# Patient Record
Sex: MALE | Race: WHITE | NOT HISPANIC OR LATINO | Employment: OTHER | URBAN - METROPOLITAN AREA
[De-identification: names, ages, dates, MRNs, and addresses within clinical notes are randomized per-mention and may not be internally consistent; named-entity substitution may affect disease eponyms.]

---

## 2017-01-10 ENCOUNTER — OFFICE VISIT (OUTPATIENT)
Dept: CARDIOLOGY | Facility: MEDICAL CENTER | Age: 67
End: 2017-01-10
Payer: MEDICARE

## 2017-01-10 VITALS
HEART RATE: 74 BPM | HEIGHT: 68 IN | OXYGEN SATURATION: 95 % | SYSTOLIC BLOOD PRESSURE: 120 MMHG | WEIGHT: 196 LBS | BODY MASS INDEX: 29.7 KG/M2 | DIASTOLIC BLOOD PRESSURE: 80 MMHG

## 2017-01-10 DIAGNOSIS — E78.2 MIXED HYPERLIPIDEMIA: ICD-10-CM

## 2017-01-10 DIAGNOSIS — I10 ESSENTIAL HYPERTENSION, BENIGN: Primary | ICD-10-CM

## 2017-01-10 PROCEDURE — 99213 OFFICE O/P EST LOW 20 MIN: CPT | Performed by: INTERNAL MEDICINE

## 2017-01-10 PROCEDURE — 3017F COLORECTAL CA SCREEN DOC REV: CPT | Performed by: INTERNAL MEDICINE

## 2017-01-10 PROCEDURE — G8420 CALC BMI NORM PARAMETERS: HCPCS | Performed by: INTERNAL MEDICINE

## 2017-01-10 PROCEDURE — 4004F PT TOBACCO SCREEN RCVD TLK: CPT | Performed by: INTERNAL MEDICINE

## 2017-01-10 PROCEDURE — G8427 DOCREV CUR MEDS BY ELIG CLIN: HCPCS | Performed by: INTERNAL MEDICINE

## 2017-01-10 ASSESSMENT — ENCOUNTER SYMPTOMS
HEADACHES: 1
CARDIOVASCULAR NEGATIVE: 1

## 2017-01-10 NOTE — MR AVS SNAPSHOT
"        Nelson TRIVEDI Tino   1/10/2017 9:30 AM   Office Visit   MRN: [id number]    Department:  [facility]   Dept Phone:  [phone]    Description:  Male : 1950   Provider:  Mukul Hernandez M.D.           Reason for Visit     Follow-Up           Allergies as of 1/10/2017     Allergen Noted Reactions    Penicillins 2010         Vital Signs     Blood Pressure Pulse Height Weight Body Mass Index Oxygen Saturation    120/80 mmHg 74 1.727 m (5' 7.99\") 88.905 kg (196 lb) 29.81 kg/m2 95%    Smoking Status                   Current Some Day Smoker           Basic Information     Date Of Birth Sex Race Ethnicity Preferred Language    1950 Male White Non- English      Problem List              ICD-10-CM Priority Class Noted - Resolved    Essential hypertension, benign I10   2015 - Present    Hyperlipidemia E78.5   2016 - Present    Depression F32.9   2016 - Present    Seasonal allergies J30.2   2016 - Present      Health Maintenance        Date Due Completion Dates    IMM DTaP/Tdap/Td Vaccine (1 - Tdap) 1967 ---    IMM ZOSTER VACCINE 2008 ---    IMM PNEUMOCOCCAL 65+ (ADULT) LOW/MEDIUM RISK SERIES (1 of 2 - PCV13) 2013 ---    IMM INFLUENZA (1) 2016 ---    COLONOSCOPY 9/3/2024 9/3/2014 (Done)    Override on 9/3/2014: [facility])            Current Immunizations     No immunizations on file.      Below and/or attached are the medications your provider expects you to take. Review all of your home medications and newly ordered medications with your provider and/or pharmacist. Follow medication instructions as directed by your provider and/or pharmacist. Please keep your medication list with you and share with your provider. Update the information when medications are discontinued, doses are changed, or new medications (including over-the-counter products) are added; and carry medication information at all times in the event of emergency " situations     Allergies:  PENICILLINS - (reactions not documented)               Medications  Valid as of: January 10, 2017 -  9:42 AM    Generic Name Brand Name Tablet Size Instructions for use    FLUoxetine HCl (Cap) PROZAC 20 MG Take 20 mg by mouth every day. Take 4  Capsules every morning         Lisinopril (Tab) PRINIVIL 10 MG Take 1 Tab by mouth every day.        Loratadine (Tab) CLARITIN 10 MG Take 10 mg by mouth as needed. For allergies        Omeprazole (CAPSULE DELAYED RELEASE) PRILOSEC 20 MG Take 20 mg by mouth 2 Times a Day.        Simvastatin (Tab) ZOCOR 40 MG Take 40 mg by mouth every evening.        Zolpidem Tartrate (Tab) AMBIEN 10 MG Take 10 mg by mouth at bedtime as needed for Sleep.        .                 Medicines prescribed today were sent to:     [facility] [address] [place] [address], [place] - [address]    [address] [place] [address]    Phone: [phone] Fax: [phone]    Open 24 Hours?: No      Medication refill instructions:       If your prescription bottle indicates you have medication refills left, it is not necessary to call your provider’s office. Please contact your pharmacy and they will refill your medication.    If your prescription bottle indicates you do not have any refills left, you may request refills at any time through one of the following ways: The online [employer] system (except Urgent Care), by calling your provider’s office, or by asking your pharmacy to contact your provider’s office with a refill request. Medication refills are processed only during regular business hours and may not be available until the next business day. Your provider may request additional information or to have a follow-up visit with you prior to refilling your medication.   *Please Note: Medication refills are assigned a new Rx number when refilled electronically. Your pharmacy may indicate that no refills were authorized even though a new prescription for the same medication is  available at the pharmacy. Please request the medicine by name with the pharmacy before contacting your provider for a refill.           Trellise Access Code: [email or web address]  Expires: 2/9/2017  9:13 AM    Trellise  A secure, online tool to manage your health information     [employer] Trellise® is a secure, online tool that connects you to your personalized health information from the privacy of your home -- day or night - making it very easy for you to manage your healthcare. Once the activation process is completed, you can even access your medical information using the Trellise tanner, which is available for free in the Apple Tanner store or Google Play store.     Trellise provides the following levels of access (as shown below):   My Chart Features   [facility] Doctor [facility]  Urgent  Care Non-[facility]  Primary Care  Doctor   Email your healthcare team securely and privately 24/7 X X X    Manage appointments: schedule your next appointment; view details of past/upcoming appointments X      Request prescription refills. X      View recent personal medical records, including lab and immunizations X X X X   View health record, including health history, allergies, medications X X X X   Read reports about your outpatient visits, procedures, consult and ER notes X X X X   See your discharge summary, which is a recap of your hospital and/or ER visit that includes your diagnosis, lab results, and care plan. X X       How to register for Trellise:  1. Go to  [email or web address].  2. Click on the Sign Up Now box, which takes you to the New Member Sign Up page. You will need to provide the following information:  a. Enter your Trellise Access Code exactly as it appears at the top of this page. (You will not need to use this code after you’ve completed the sign-up process. If you do not sign up before the expiration date, you must request a new code.)   b. Enter your date of birth.   c. Enter  your home email address.   d. Click Submit, and follow the next screen’s instructions.  3. Create a PerfectSearcht ID. This will be your H[employer] Space login ID and cannot be changed, so think of one that is secure and easy to remember.  4. Create a PerfectSearcht password. You can change your password at any time.  5. Enter your Password Reset Question and Answer. This can be used at a later time if you forget your password.   6. Enter your e-mail address. This allows you to receive e-mail notifications when new information is available in H[employer] Space.  7. Click Sign Up. You can now view your health information.    For assistance activating your Hire Space account, call [phone]

## 2017-01-10 NOTE — Clinical Note
Name:          Nelson Jiménez   YOB: 1950  Date:     1/10/2017      Vanesa Clemente M.D.  [address] [place] [address]     Mukul Hernandez MD  [address]  [place] [address]  Phone: [phone]  Back Line: [phone]  Fax: [phone]  E-mail: [email or web address]   Dear Dr. Clemente,    We had the pleasure of seeing your patient, Nelson Jiménez, in Cardiology Clinic at [facility] today.    As you know, he is a 66-year-old man with a history of hypertension, dyslipidemia and depression who is self referred for evaluation of hypertension and cardiovascular risk.    He is doing great today without any cardiovascular complaints. His blood pressure in our office was 120/80 mmHg on 10 mg by mouth daily of lisinopril. He is also on simvastatin and tolerating that medication well. I have not seen recent lipid panel and he tells me that he has one scheduled and will send me the results of that. He staying quite active. I encouraged him again in lifestyle modification.    We will have the patient follow-up in one year, sooner if needed.    Thank you for the referral and please do not hesitate to contact me at any time. My contact information is listed above.    This note was dictated using Dragon speech recognition software.     A full note including my physical examination and a full list of rectified medications is available in our medical record, and can be faxed as well.    Mukul Hernandez MD  Cardiologist  [facility]

## 2017-01-10 NOTE — PROGRESS NOTES
Name:          Nelson Jiménez   YOB: 1950  Date:     1/10/2017      Vanesa Clemente M.D.  [address] [place] [address]     Mukul Hernandez MD  [address]  [place] [address]  Phone: [phone]  Back Line: [phone]  Fax: [phone]  E-mail: [email or web address]   Dear Dr. Clemente,    We had the pleasure of seeing your patient, Nelson Jiménez, in Cardiology Clinic at [facility] today.    As you know, he is a 66-year-old man with a history of hypertension, dyslipidemia and depression who is self referred for evaluation of hypertension and cardiovascular risk.    He is doing great today without any cardiovascular complaints. His blood pressure in our office was 120/80 mmHg on 10 mg by mouth daily of lisinopril. He is also on simvastatin and tolerating that medication well. I have not seen recent lipid panel and he tells me that he has one scheduled and will send me the results of that. He staying quite active. I encouraged him again in lifestyle modification.    We will have the patient follow-up in one year, sooner if needed.    Thank you for the referral and please do not hesitate to contact me at any time. My contact information is listed above.    This note was dictated using Dragon speech recognition software.     A full note including my physical examination and a full list of rectified medications is available in our medical record, and can be faxed as well.    Mukul Hernandez MD  Cardiologist  [facility]

## 2017-01-10 NOTE — PROGRESS NOTES
Subjective:   Nelson Jiménez is a 66-year-old man with a history of hypertension, dyslipidemia and depression who is self referred for evaluation of hypertension and cardiovascular risk.    He continues to do well, has no cardiovascular complaint. He is staying quite active having skied recently and exercising for 30 minutes on his stationary bike at her rates around 110 215 bpm fairly closely regulated with out again any chest discomfort nor new exertional dyspnea.    He does relate some headaches that he says are well treated with Excedrin.    [facility] He brings in his blood pressure cuff, which was unable to be calibrated. It is apparently about 15 years old.    Past Medical History   Diagnosis Date   • Psychiatric disorder      depression   • Anxiety    • Indigestion      GERD   • Backpain    • Arthritis      loulou spine   • Degenerative joint disease of spine    • Essential hypertension, benign 12/21/2015     Past Surgical History   Procedure Laterality Date   • Other orthopedic surgery       L little finger fx repair     Family History   Problem Relation Age of Onset   • Hypertension Mother    • Hyperlipidemia Mother    • Other Father      psorosis of the liver     History   Smoking status   • Current Some Day Smoker   • Types: Cigars   Smokeless tobacco   • Never Used     Comment: 1-2 cigars per month     Allergies   Allergen Reactions   • Penicillins      Outpatient Encounter Prescriptions as of 1/10/2017   Medication Sig Dispense Refill   • lisinopril (PRINIVIL) 10 MG Tab Take 1 Tab by mouth every day. 90 Tab 3   • zolpidem (AMBIEN) 10 MG Tab Take 10 mg by mouth at bedtime as needed for Sleep.     • loratadine (CLARITIN) 10 MG TABS Take 10 mg by mouth as needed. For allergies     • fluoxetine (PROZAC) 20 MG CAPS Take 20 mg by mouth every day. Take 4  Capsules every morning      • omeprazole (PRILOSEC) 20 MG CPDR Take 20 mg by mouth 2 Times a Day.     •  "simvastatin (ZOCOR) 40 MG TABS Take 40 mg by mouth every evening.       No facility-administered encounter medications on file as of 1/10/2017.     Review of Systems   Cardiovascular: Negative.    Neurological: Positive for headaches.   All other systems reviewed and are negative.       Objective:   /80 mmHg  Pulse 74  Ht 1.727 m (5' 7.99\")  Wt 88.905 kg (196 lb)  BMI 29.81 kg/m2  SpO2 95%    Physical Exam   Constitutional: He is oriented to person, place, and time. He appears well-developed and well-nourished. No distress.   Very pleasant middle-aged man in no distress   HENT:   Head: Normocephalic and atraumatic.   Eyes: Conjunctivae and EOM are normal. Pupils are equal, round, and reactive to light. No scleral icterus.   Neck: Neck supple. No JVD present. No tracheal deviation present.   Cardiovascular: Normal rate, regular rhythm, normal heart sounds and intact distal pulses.  Exam reveals no gallop and no friction rub.    No murmur heard.  No carotid bruits   Pulmonary/Chest: Effort normal and breath sounds normal. No stridor. No respiratory distress. He has no wheezes. He has no rales.   Abdominal: Soft. Bowel sounds are normal. He exhibits no distension.   Musculoskeletal: He exhibits no edema.   Neurological: He is alert and oriented to person, place, and time.   Skin: Skin is warm and dry. No rash noted. He is not diaphoretic. No erythema. No pallor.   Psychiatric: He has a normal mood and affect. Judgment and thought content normal.   Vitals reviewed.       2/9/2010    Cholesterol,Tot 139   Triglycerides 94   HDL 32 (L)   LDL 88       Assessment:     1. Essential hypertension, benign     2. Mixed hyperlipidemia         Medical Decision Making:  Today's Assessment / Status / Plan:     See separate letter to PCP.    Mukul Hernandez MD  Cardiologist, [facility] [place]     "

## 2017-06-30 ENCOUNTER — OFFICE VISIT (OUTPATIENT)
Dept: URGENT CARE | Facility: CLINIC | Age: 67
End: 2017-06-30
Payer: MEDICARE

## 2017-06-30 VITALS
RESPIRATION RATE: 20 BRPM | BODY MASS INDEX: 27.38 KG/M2 | OXYGEN SATURATION: 96 % | WEIGHT: 180 LBS | HEART RATE: 72 BPM | DIASTOLIC BLOOD PRESSURE: 68 MMHG | TEMPERATURE: 97.7 F | SYSTOLIC BLOOD PRESSURE: 110 MMHG

## 2017-06-30 DIAGNOSIS — J06.9 URI, ACUTE: ICD-10-CM

## 2017-06-30 PROCEDURE — 99203 OFFICE O/P NEW LOW 30 MIN: CPT | Performed by: NURSE PRACTITIONER

## 2017-06-30 RX ORDER — AZITHROMYCIN 250 MG/1
TABLET, FILM COATED ORAL
Qty: 1 QUANTITY SUFFICIENT | Refills: 0 | Status: SHIPPED | OUTPATIENT
Start: 2017-06-30 | End: 2018-03-15

## 2017-06-30 ASSESSMENT — ENCOUNTER SYMPTOMS
NAUSEA: 0
CHILLS: 0
DIZZINESS: 0
COUGH: 1
HEADACHES: 0
DIARRHEA: 0
SORE THROAT: 0
PALPITATIONS: 0
FEVER: 0
VOMITING: 0
SHORTNESS OF BREATH: 0
MYALGIAS: 0
SPUTUM PRODUCTION: 1

## 2017-06-30 NOTE — MR AVS SNAPSHOT
Nelson Jiménez   2017 10:30 AM   Office Visit   MRN: [id number]    Department:  [facility]   Dept Phone:  [phone]    Description:  Male : 1950   Provider:  MADHU Wade.           Reason for Visit     Cough Over a week dry cpogh and chest congestion .      Allergies as of 2017     Allergen Noted Reactions    Penicillins 2010         You were diagnosed with     URI, acute   [[id number]]         Vital Signs     Blood Pressure Pulse Temperature Respirations Weight Oxygen Saturation    110/68 mmHg 72 36.5 °C (97.7 °F) 20 81.647 kg (180 lb) 96%    Smoking Status                   Current Some Day Smoker           Basic Information     Date Of Birth Sex Race Ethnicity Preferred Language    1950 Male White Non- English      Problem List              ICD-10-CM Priority Class Noted - Resolved    Essential hypertension, benign I10   2015 - Present    Hyperlipidemia E78.5   2016 - Present    Depression F32.9   2016 - Present    Seasonal allergies J30.2   2016 - Present      Health Maintenance        Date Due Completion Dates    IMM DTaP/Tdap/Td Vaccine (1 - Tdap) 1967 ---    IMM ZOSTER VACCINE 2008 ---    IMM PNEUMOCOCCAL 65+ (ADULT) LOW/MEDIUM RISK SERIES (1 of 2 - PCV13) 2013 ---    COLONOSCOPY 9/3/2024 9/3/2014 (Done)    Override on 9/3/2014: [facility])            Current Immunizations     No immunizations on file.      Below and/or attached are the medications your provider expects you to take. Review all of your home medications and newly ordered medications with your provider and/or pharmacist. Follow medication instructions as directed by your provider and/or pharmacist. Please keep your medication list with you and share with your provider. Update the information when medications are discontinued, doses are changed, or new medications (including over-the-counter products) are added; and carry  medication information at all times in the event of emergency situations     Allergies:  PENICILLINS - (reactions not documented)               Medications  Valid as of: June 30, 2017 - 12:55 PM    Generic Name Brand Name Tablet Size Instructions for use    Azithromycin (Tab) ZITHROMAX 250 MG Take a zpack as directed        FLUoxetine HCl (Cap) PROZAC 20 MG Take 20 mg by mouth every day. Take 4  Capsules every morning         Lisinopril (Tab) PRINIVIL 10 MG Take 1 Tab by mouth every day.        Loratadine (Tab) CLARITIN 10 MG Take 10 mg by mouth as needed. For allergies        Omeprazole (CAPSULE DELAYED RELEASE) PRILOSEC 20 MG Take 20 mg by mouth 2 Times a Day.        Simvastatin (Tab) ZOCOR 40 MG Take 40 mg by mouth every evening.        Zolpidem Tartrate (Tab) AMBIEN 10 MG Take 10 mg by mouth at bedtime as needed for Sleep.        .                 Medicines prescribed today were sent to:     [facility] [address] [place] [address], [place] - [address]    [address] [place] [address]    Phone: [phone] Fax: [phone]    Open 24 Hours?: No      Medication refill instructions:       If your prescription bottle indicates you have medication refills left, it is not necessary to call your provider’s office. Please contact your pharmacy and they will refill your medication.    If your prescription bottle indicates you do not have any refills left, you may request refills at any time through one of the following ways: The online [employer] system (except Urgent Care), by calling your provider’s office, or by asking your pharmacy to contact your provider’s office with a refill request. Medication refills are processed only during regular business hours and may not be available until the next business day. Your provider may request additional information or to have a follow-up visit with you prior to refilling your medication.   *Please Note: Medication refills are assigned a new Rx number when refilled  electronically. Your pharmacy may indicate that no refills were authorized even though a new prescription for the same medication is available at the pharmacy. Please request the medicine by name with the pharmacy before contacting your provider for a refill.           MyChart Status: Patient Declined        Quit Tobacco Information     Do you want to quit using tobacco?    Quitting tobacco decreases risks of cancer, heart and lung disease, increases life expectancy, improves sense of taste and smell, and increases spending money, among other benefits.    If you are thinking about quitting, we can help.  • [facility] Quit Tobacco Program: 990.529.9551  o Program occurs weekly for four weeks and includes pharmacist consultation on products to support quitting smoking or chewing tobacco. A provider referral is needed for pharmacist consultation.  • Tobacco Users Help Hotline: 1-339-QUITNOW (545-0151) or https://[place].quitlogix.org/  o Free, confidential telephone and online coaching for [place] residents. Sessions are designed on a schedule that is convenient for you. Eligible clients receive free nicotine replacement therapy.  • Nationally: www.smokefree.gov  o Information and professional assistance to support both immediate and long-term needs as you become, and remain, a non-smoker. Smokefree.gov allows you to choose the help that best fits your needs.

## 2017-06-30 NOTE — PROGRESS NOTES
"Subjective:      Nelson Jiménez is a 66 y.o. male who presents with Cough    Chief Complaint   Patient presents with   • Cough     Over a week dry cpogh and chest congestion .     Started 3 weeks ago with fever, states got a little better but then got fatigue, chills, body aches  Now \"feels deep in chest:\"  No fever now  No medications  No ear pain           HPI    Review of Systems   Constitutional: Positive for malaise/fatigue. Negative for fever and chills.   HENT: Positive for congestion. Negative for sore throat.    Respiratory: Positive for cough and sputum production. Negative for shortness of breath.    Cardiovascular: Negative for chest pain and palpitations.   Gastrointestinal: Negative for nausea, vomiting and diarrhea.   Genitourinary: Negative for dysuria.   Musculoskeletal: Negative for myalgias.   Skin: Negative for rash.   Neurological: Negative for dizziness and headaches.     Past Medical History   Diagnosis Date   • Psychiatric disorder      depression   • Anxiety    • Indigestion      GERD   • Backpain    • Arthritis      loulou spine   • Degenerative joint disease of spine    • Essential hypertension, benign 12/21/2015     Family history reviewed and not related to this visit  Social History     Social History   • Marital Status: Single     Spouse Name: N/A   • Number of Children: N/A   • Years of Education: N/A     Occupational History   • Not on file.     Social History Main Topics   • Smoking status: Current Some Day Smoker     Types: Cigars   • Smokeless tobacco: Never Used      Comment: 1-2 cigars per month   • Alcohol Use: 7.2 oz/week     12 Cans of beer per week      Comment: 1-2 beers per week   • Drug Use: No   • Sexual Activity: Yes     Other Topics Concern   • Not on file     Social History Narrative         Objective:     /68 mmHg  Pulse 72  Temp(Src) 36.5 °C (97.7 °F)  Resp 20  Wt 81.647 kg (180 lb)  SpO2 96%     Physical Exam   Constitutional: He is oriented to person, " place, and time. He appears well-developed and well-nourished. No distress.   HENT:   Head: Normocephalic and atraumatic.   Increased post nasal drip     Cardiovascular: Normal rate, regular rhythm and normal heart sounds.    Pulmonary/Chest: Effort normal. No respiratory distress. He has no wheezes. He has rales (moved with cough).   Musculoskeletal: Normal range of motion.   Neurological: He is alert and oriented to person, place, and time.   Skin: Skin is warm and dry.   Psychiatric: He has a normal mood and affect. His behavior is normal. Judgment and thought content normal.   Nursing note and vitals reviewed.              Assessment/Plan:     1. URI, acute     - azithromycin (ZITHROMAX) 250 MG Tab; Take a zpack as directed  Dispense: 1 Quantity Sufficient; Refill: 0    Please make an appointment for follow up with your primary care provider or make appointment to establish with a primary care. Return for any perception of change in condition, worsening of symptoms, such as perception of worse pain, worsening fever, not getting better, or any other concerns. Please go to the nearest emergency room for any shortness of breath or chest pain or for any of the emergent conditions we have discussed. Patient and/or family states understanding to plan of care, and discharge instructions. Different diagnosis were discussed and signs/symptoms were discussed to educate on.

## 2017-11-28 ENCOUNTER — APPOINTMENT (RX ONLY)
Dept: URBAN - METROPOLITAN AREA CLINIC 20 | Facility: CLINIC | Age: 67
Setting detail: DERMATOLOGY
End: 2017-11-28

## 2017-11-28 DIAGNOSIS — L81.4 OTHER MELANIN HYPERPIGMENTATION: ICD-10-CM

## 2017-11-28 DIAGNOSIS — D18.0 HEMANGIOMA: ICD-10-CM

## 2017-11-28 DIAGNOSIS — L82.1 OTHER SEBORRHEIC KERATOSIS: ICD-10-CM

## 2017-11-28 DIAGNOSIS — L57.8 OTHER SKIN CHANGES DUE TO CHRONIC EXPOSURE TO NONIONIZING RADIATION: ICD-10-CM

## 2017-11-28 DIAGNOSIS — L82.0 INFLAMED SEBORRHEIC KERATOSIS: ICD-10-CM

## 2017-11-28 DIAGNOSIS — D22 MELANOCYTIC NEVI: ICD-10-CM

## 2017-11-28 PROBLEM — K21.9 GASTRO-ESOPHAGEAL REFLUX DISEASE WITHOUT ESOPHAGITIS: Status: ACTIVE | Noted: 2017-11-28

## 2017-11-28 PROBLEM — D22.5 MELANOCYTIC NEVI OF TRUNK: Status: ACTIVE | Noted: 2017-11-28

## 2017-11-28 PROBLEM — I10 ESSENTIAL (PRIMARY) HYPERTENSION: Status: ACTIVE | Noted: 2017-11-28

## 2017-11-28 PROBLEM — E78.5 HYPERLIPIDEMIA, UNSPECIFIED: Status: ACTIVE | Noted: 2017-11-28

## 2017-11-28 PROBLEM — D18.01 HEMANGIOMA OF SKIN AND SUBCUTANEOUS TISSUE: Status: ACTIVE | Noted: 2017-11-28

## 2017-11-28 PROCEDURE — ? COUNSELING

## 2017-11-28 PROCEDURE — 17110 DESTRUCTION B9 LES UP TO 14: CPT

## 2017-11-28 PROCEDURE — 99214 OFFICE O/P EST MOD 30 MIN: CPT | Mod: 25

## 2017-11-28 PROCEDURE — ? LIQUID NITROGEN

## 2017-11-28 ASSESSMENT — LOCATION DETAILED DESCRIPTION DERM
LOCATION DETAILED: RIGHT INFERIOR LATERAL FOREHEAD
LOCATION DETAILED: LEFT ANTERIOR DISTAL THIGH
LOCATION DETAILED: RIGHT ANTERIOR PROXIMAL UPPER ARM
LOCATION DETAILED: LEFT PROXIMAL DORSAL FOREARM
LOCATION DETAILED: RIGHT CENTRAL MALAR CHEEK
LOCATION DETAILED: RIGHT SUPERIOR MEDIAL MALAR CHEEK
LOCATION DETAILED: RIGHT ANTERIOR EARLOBE
LOCATION DETAILED: RIGHT LATERAL FOREHEAD
LOCATION DETAILED: RIGHT MID-UPPER BACK
LOCATION DETAILED: RIGHT PROXIMAL DORSAL FOREARM
LOCATION DETAILED: LEFT ANTERIOR PROXIMAL UPPER ARM
LOCATION DETAILED: LEFT INFERIOR CENTRAL MALAR CHEEK
LOCATION DETAILED: LEFT MEDIAL SUPERIOR CHEST
LOCATION DETAILED: RIGHT SUPERIOR MEDIAL UPPER BACK
LOCATION DETAILED: LEFT MEDIAL MALAR CHEEK
LOCATION DETAILED: RIGHT ANTERIOR DISTAL THIGH
LOCATION DETAILED: RIGHT INFERIOR UPPER BACK

## 2017-11-28 ASSESSMENT — LOCATION SIMPLE DESCRIPTION DERM
LOCATION SIMPLE: LEFT CHEEK
LOCATION SIMPLE: LEFT UPPER ARM
LOCATION SIMPLE: LEFT FOREARM
LOCATION SIMPLE: LEFT THIGH
LOCATION SIMPLE: RIGHT CHEEK
LOCATION SIMPLE: CHEST
LOCATION SIMPLE: RIGHT EAR
LOCATION SIMPLE: RIGHT FOREHEAD
LOCATION SIMPLE: RIGHT UPPER ARM
LOCATION SIMPLE: RIGHT UPPER BACK
LOCATION SIMPLE: RIGHT THIGH
LOCATION SIMPLE: RIGHT FOREARM

## 2017-11-28 ASSESSMENT — LOCATION ZONE DERM
LOCATION ZONE: ARM
LOCATION ZONE: FACE
LOCATION ZONE: EAR
LOCATION ZONE: TRUNK
LOCATION ZONE: LEG

## 2018-03-15 ENCOUNTER — OFFICE VISIT (OUTPATIENT)
Dept: CARDIOLOGY | Facility: MEDICAL CENTER | Age: 68
End: 2018-03-15
Payer: MEDICARE

## 2018-03-15 VITALS
OXYGEN SATURATION: 96 % | DIASTOLIC BLOOD PRESSURE: 90 MMHG | HEIGHT: 68 IN | HEART RATE: 82 BPM | BODY MASS INDEX: 29.1 KG/M2 | WEIGHT: 192 LBS | SYSTOLIC BLOOD PRESSURE: 130 MMHG

## 2018-03-15 DIAGNOSIS — I10 ESSENTIAL HYPERTENSION, BENIGN: ICD-10-CM

## 2018-03-15 DIAGNOSIS — E78.5 DYSLIPIDEMIA: Primary | ICD-10-CM

## 2018-03-15 DIAGNOSIS — F17.200 SMOKER: ICD-10-CM

## 2018-03-15 PROCEDURE — 99213 OFFICE O/P EST LOW 20 MIN: CPT | Performed by: INTERNAL MEDICINE

## 2018-03-15 RX ORDER — DIPHENHYDRAMINE HCL 25 MG
25 TABLET ORAL EVERY 6 HOURS PRN
COMMUNITY
End: 2022-04-26

## 2018-03-15 ASSESSMENT — ENCOUNTER SYMPTOMS
CARDIOVASCULAR NEGATIVE: 1
HEADACHES: 1

## 2018-03-15 NOTE — LETTER
Name:          Nelson Jiménez   YOB: 1950  Date:     3/15/2018      Vanesa Clemente M.D.  [address] [place] [address]     Mukul Hernandez MD  [address], [address]  [place] [address]  Phone: [phone]  Back Line: [phone]  Fax: [phone]  E-mail: [email or web address]   Dear Dr. Clemente,    We had the pleasure of seeing your patient, Nelson Jiménez, in Cardiology Clinic at [facility] and [facility] today.    As you know, he is a 67-year-old man with a history of hypertension, dyslipidemia and depression who is self referred for evaluation of hypertension and cardiovascular risk.    He again has no cardiovascular complaints, but brings in a lipid panel with an LDL get above goal at 115 mg/dL as it was roughly a year ago, and continues to smoke. In conjunction with his ongoing risk factors I did order a CT coronary calcium scan to adjudicate whether or not we should control his lipids more tightly. I encouraged continued lifestyle modification.    Return in about 3 months (around 6/15/2018).    Thank you for the referral and please do not hesitate to contact me at any time. My contact information is listed above.    This note was dictated using Dragon speech recognition software.     A full note including my physical examination and a full list of rectified medications is available in our medical record, and can be faxed as well.    Mukul Hernandez MD  Cardiologist  [facility]

## 2018-03-16 NOTE — PROGRESS NOTES
Subjective:   Nelson Jiménez is a 66-year-old man with a history of hypertension, dyslipidemia and depression who is self referred for evaluation of hypertension and cardiovascular risk.    He continues to do well today, and has no cardiovascular complaints. He tells me that he has skiing for exercise and also doing gym exercises on a stationary bike with heart rates just below 120 bpm out of concern for risk of a coronary event with higher intensity exercise.    He has no side effects of his medications, and again is otherwise doing well. [facility] detailed [facility].    Past Medical History:   Diagnosis Date   • Anxiety    • Arthritis     loulou spine   • Backpain    • Degenerative joint disease of spine    • Essential hypertension, benign 12/21/2015   • Indigestion     GERD   • Psychiatric disorder     depression     Past Surgical History:   Procedure Laterality Date   • OTHER ORTHOPEDIC SURGERY      L little finger fx repair     Family History   Problem Relation Age of Onset   • Hypertension Mother    • Hyperlipidemia Mother    • Other Father      psorosis of the liver     History   Smoking Status   • Current Some Day Smoker   • Types: Cigars   Smokeless Tobacco   • Never Used     Comment: 1-2 cigars per month     Allergies   Allergen Reactions   • Penicillins      Outpatient Encounter Prescriptions as of 3/15/2018   Medication Sig Dispense Refill   • diphenhydrAMINE (BENADRYL) 25 MG Tab Take 25 mg by mouth as needed for Sleep.     • lisinopril (PRINIVIL) 10 MG Tab Take 1 Tab by mouth every day. 90 Tab 3   • zolpidem (AMBIEN) 10 MG Tab Take 10 mg by mouth at bedtime as needed for Sleep.     • loratadine (CLARITIN) 10 MG TABS Take 10 mg by mouth as needed. For allergies     • fluoxetine (PROZAC) 20 MG CAPS Take 20 mg by mouth every day. Take 4  Capsules every morning      • omeprazole (PRILOSEC) 20 MG CPDR Take 20 mg by mouth 2 Times a Day.     • simvastatin (ZOCOR) 40 MG  "TABS Take 40 mg by mouth every evening.     • [DISCONTINUED] azithromycin (ZITHROMAX) 250 MG Tab Take a zpack as directed (Patient not taking: Reported on 3/15/2018) 1 Quantity Sufficient 0     No facility-administered encounter medications on file as of 3/15/2018.      Review of Systems   Cardiovascular: Negative.    Neurological: Positive for headaches.   All other systems reviewed and are negative.       Objective:   /90   Pulse 82   Ht 1.727 m (5' 8\")   Wt 87.1 kg (192 lb)   SpO2 96%   BMI 29.19 kg/m²     Physical Exam   Constitutional: He is oriented to person, place, and time. He appears well-developed and well-nourished. No distress.   Very pleasant middle-aged man in no distress   HENT:   Head: Normocephalic and atraumatic.   Eyes: Conjunctivae and EOM are normal. Pupils are equal, round, and reactive to light. No scleral icterus.   Neck: Neck supple. No JVD present. No tracheal deviation present.   Cardiovascular: Normal rate, regular rhythm, normal heart sounds and intact distal pulses.  Exam reveals no gallop and no friction rub.    No murmur heard.  No carotid bruits   Pulmonary/Chest: Effort normal and breath sounds normal. No stridor. No respiratory distress. He has no wheezes. He has no rales.   Abdominal: Soft. Bowel sounds are normal. He exhibits no distension.   Musculoskeletal: He exhibits no edema.   Neurological: He is alert and oriented to person, place, and time.   Skin: Skin is warm and dry. No rash noted. He is not diaphoretic. No erythema. No pallor.   Psychiatric: He has a normal mood and affect. Judgment and thought content normal.   Vitals reviewed.    Labs, 4/7/2017  CBC: WBC 4.0, hemoglobin 14.7, platelets 393  Chemstrip panel: Sodium 139, potassium 4.3, chloride 105, CO2 27, BUN 17, creatinine 1.1, glucose 97  LFTs: AST 20, ALT 14, alkaline phosphatase 105, albumin 4.2, total protein 7.6, total bili  melissa 0.6  Lipids: Calculated , HDL 60, triglycerides 103, total " cholesterol 190    [facility]  Chemistry panel: Sodium 140, potassium 3.8, chloride not reported, CO2 also not reported, BUN 15, creatinine 1.1, glucose 95, calcium 9.0  LFTs: AST 19, ALT 14, alkaline phosphatase 104, albumin 4.3, total protein 7.6  Lipids: , HDL 61, triglycerides 89, total cholesterol 191  CBC: WBC 4.8, hemoglobin 14.7, platelets 397    Assessment:     1. Dyslipidemia  CT-CARDIAC SCORING   2. Essential hypertension, benign     3. Smoker         Medical Decision Making:  Today's Assessment / Status / Plan:     He again has no cardiovascular complaints, but brings in a lipid panel with an LDL get above goal at 115 mg/dL as it was roughly a year ago, and continues to smoke. In conjunction with his ongoing risk factors I did order a CT coronary calcium scan to adjudicate whether or not we should control his lipids more tightly. I encouraged continued lifestyle modification.    Mukul Hernandez MD  Cardiologist, [facility] [place]     Return in about 3 months (around 6/15/2018).

## 2018-03-26 ENCOUNTER — HOSPITAL ENCOUNTER (OUTPATIENT)
Dept: RADIOLOGY | Facility: MEDICAL CENTER | Age: 68
End: 2018-03-26
Attending: INTERNAL MEDICINE
Payer: COMMERCIAL

## 2018-03-26 DIAGNOSIS — E78.5 DYSLIPIDEMIA: ICD-10-CM

## 2018-03-26 PROCEDURE — 4410556 CT-CARDIAC SCORING

## 2018-03-27 ENCOUNTER — TELEPHONE (OUTPATIENT)
Dept: CARDIOLOGY | Facility: MEDICAL CENTER | Age: 68
End: 2018-03-27

## 2018-03-27 DIAGNOSIS — E78.2 MIXED HYPERLIPIDEMIA: ICD-10-CM

## 2018-03-27 RX ORDER — ATORVASTATIN CALCIUM 40 MG/1
40 TABLET, FILM COATED ORAL
Qty: 30 TAB | Refills: 6 | OUTPATIENT
Start: 2018-03-27 | End: 2018-04-03 | Stop reason: SDUPTHER

## 2018-03-27 NOTE — TELEPHONE ENCOUNTER
----- Message from Mukul Hernandez M.D. sent at 3/27/2018  8:08 AM PDT -----  Moderate coronary calcification. On the basis of these results, I would recommend changing his cholesterol medicine from simvastatin to atorvastatin 40 mg by mouth daily at bedtime. That in addition to diet and exercise will be required to bring his LDL cholesterol down to a target of 70 mg/dL. Recheck lipid panel just prior to our next visit in June.    LUCINA BOYLE    =======================================================    Attempted to call pt on his home and mobile phone, no answer, left vm to call back

## 2018-03-27 NOTE — TELEPHONE ENCOUNTER
Pt called back, discussed CT-Cardiac scoring per Dr Hernandez and his recommendations, pt agreed and verbalizes understanding.     New Rx for Atorvastatin called in to [facility], MAR updated.    Lipid panel ordered, lab slip mailed to pt.

## 2018-04-03 ENCOUNTER — TELEPHONE (OUTPATIENT)
Dept: CARDIOLOGY | Facility: MEDICAL CENTER | Age: 68
End: 2018-04-03

## 2018-04-03 DIAGNOSIS — E78.2 MIXED HYPERLIPIDEMIA: ICD-10-CM

## 2018-04-03 RX ORDER — ATORVASTATIN CALCIUM 40 MG/1
40 TABLET, FILM COATED ORAL
Qty: 30 TAB | Refills: 11 | Status: SHIPPED
Start: 2018-04-03 | End: 2018-06-11 | Stop reason: SDUPTHER

## 2018-04-03 NOTE — TELEPHONE ENCOUNTER
----- Message from Krystal Roy sent at 4/3/2018  7:43 AM PDT -----  Regarding: [facility]  [facility]/Jossie    Patient said that Dr Hernandez took him off Simvastatin and put him on Atorvastatin. [facility] and they need something in writing about the change faxed to them at [phone]. Patient can be reached at [phone].    ================================================================    [facility]'s [facility]

## 2018-06-11 ENCOUNTER — TELEPHONE (OUTPATIENT)
Dept: CARDIOLOGY | Facility: MEDICAL CENTER | Age: 68
End: 2018-06-11

## 2018-06-11 DIAGNOSIS — E78.2 MIXED HYPERLIPIDEMIA: ICD-10-CM

## 2018-06-11 RX ORDER — ATORVASTATIN CALCIUM 40 MG/1
40 TABLET, FILM COATED ORAL
Qty: 30 TAB | Refills: 11 | Status: SHIPPED | OUTPATIENT
Start: 2018-06-11 | End: 2018-12-26 | Stop reason: SDUPTHER

## 2018-06-11 NOTE — TELEPHONE ENCOUNTER
----- Message from Miguelina Bowen sent at 6/11/2018  9:56 AM PDT -----  Regarding: patient needs [facility] to [facility]  [facility]/Tabitha      Patient said his Atorvastatin he [facility] in [facility] was 20 MGS, and it was supposed to be 40 MGS. He doubled up on the medication, and now he's out. He's requesting a new prescription for Atorvastatin 40 MGS [facility] at [phone]. Patient can be reached at [phone].

## 2018-06-19 LAB
CHOLEST SERPL-MCNC: 155 MG/DL (ref 100–199)
HDLC SERPL-MCNC: 68 MG/DL
LABORATORY COMMENT REPORT: NORMAL
LDLC SERPL CALC-MCNC: 76 MG/DL (ref 0–99)
TRIGL SERPL-MCNC: 53 MG/DL (ref 0–149)
VLDLC SERPL CALC-MCNC: 11 MG/DL (ref 5–40)

## 2018-06-20 ENCOUNTER — OFFICE VISIT (OUTPATIENT)
Dept: CARDIOLOGY | Facility: MEDICAL CENTER | Age: 68
End: 2018-06-20
Payer: MEDICARE

## 2018-06-20 VITALS
BODY MASS INDEX: 28.04 KG/M2 | WEIGHT: 185 LBS | HEART RATE: 84 BPM | OXYGEN SATURATION: 96 % | DIASTOLIC BLOOD PRESSURE: 60 MMHG | SYSTOLIC BLOOD PRESSURE: 110 MMHG | HEIGHT: 68 IN

## 2018-06-20 DIAGNOSIS — I10 ESSENTIAL HYPERTENSION, BENIGN: ICD-10-CM

## 2018-06-20 DIAGNOSIS — E78.2 MIXED HYPERLIPIDEMIA: ICD-10-CM

## 2018-06-20 DIAGNOSIS — I25.10 CORONARY ARTERY CALCIFICATION SEEN ON CAT SCAN: Primary | ICD-10-CM

## 2018-06-20 PROCEDURE — 99213 OFFICE O/P EST LOW 20 MIN: CPT | Performed by: INTERNAL MEDICINE

## 2018-06-20 ASSESSMENT — ENCOUNTER SYMPTOMS
HEADACHES: 1
CARDIOVASCULAR NEGATIVE: 1

## 2018-06-20 NOTE — LETTER
Name:          Nelson Jiménez   YOB: 1950  Date:     06/20/2018      Tennille Sampson M.D.  [address]  [place] [address]     Mukul Hernandez MD  [address]  [place] [address]  Phone: [phone]  Back Line: [phone]  Fax: [phone]  E-mail: [email or web address]   Dear Dr. Sampson,    We had the pleasure of seeing your patient, Nelson Jiménez, in Cardiology Clinic at [facility] and Vascular today.    As you know, he is a 67-year-old man who I follow for hypertension, and dyslipidemia with moderate coronary artery calcification on CT chest (total score 519 by CT 3/2018).    He is doing well and has no cardiovascular complaints as usual.  He comes in to review the results of the CT coronary calcium scan that did show moderately calcified coronary arteries related to which I changed his simvastatin to atorvastatin 40 mg p.o. nightly.  On that therapy is LDL is 76 mg/dL much closer to my goal of 70 mg/dL.  I recommend at this point diet and exerciseed to get him to goal on his lipids.  His blood pressure is well controlled, and I have made no other changes to his medical regimen nor ordered additional testing at this time.  Specifically, I do not think he needs a stress test on the basis of his coronary calcifications.    Return in about 1 year (around 6/20/2019).    Thank you for the referral and please do not hesitate to contact me at any time. My contact information is listed above.    This note was dictated using Dragon speech recognition software.     A full note including my physical examination and a full list of rectified medications is available in our medical record, and can be faxed as well.    Mukul Hernandez MD  Cardiologist  [facility]

## 2018-06-20 NOTE — PROGRESS NOTES
Subjective:   Nelson Jiménez is a 67 -year-old man who I follow for hypertension, and dyslipidemia with moderate coronary artery calcification on CT chest (total score 519 by CT 3/2018).    He continues to do well, and has no cardiovascular complaints coming in to review the results of his CT coronary calcium scan that did show moderately calcified coronary arteries related to which I changed him from simvastatin to atorvastatin.  He denies any side effects with the atorvastatin.    He does continue to walk quite a bit with golfing and has not yet started participating in writing his wife's stationary bike.  He plans to do so, and asked me about some points regarding that.  He is attempting to modify his diet favorably though he does not much appreciate fish.    Past Medical History:   Diagnosis Date   • Anxiety    • Arthritis     loulou spine   • Backpain    • Degenerative joint disease of spine    • Essential hypertension, benign 12/21/2015   • Indigestion     GERD   • Psychiatric disorder     depression     Past Surgical History:   Procedure Laterality Date   • OTHER ORTHOPEDIC SURGERY      L little finger fx repair     Family History   Problem Relation Age of Onset   • Hypertension Mother    • Hyperlipidemia Mother    • Other Father      psorosis of the liver     History   Smoking Status   • Current Some Day Smoker   • Types: Cigars   Smokeless Tobacco   • Never Used     Comment: 1-2 cigars per month     Allergies   Allergen Reactions   • Penicillins      Outpatient Encounter Prescriptions as of 6/20/2018   Medication Sig Dispense Refill   • atorvastatin (LIPITOR) 40 MG Tab Take 1 Tab by mouth every bedtime. 30 Tab 11   • lisinopril (PRINIVIL) 10 MG Tab Take 1 Tab by mouth every day. 90 Tab 3   • zolpidem (AMBIEN) 10 MG Tab Take 10 mg by mouth at bedtime as needed for Sleep.     • loratadine (CLARITIN) 10 MG TABS Take 10 mg by mouth as needed. For allergies     • fluoxetine (PROZAC) 20 MG CAPS Take 20 mg by mouth  "every day. Take 4  Capsules every morning      • omeprazole (PRILOSEC) 20 MG CPDR Take 20 mg by mouth 2 Times a Day.     • diphenhydrAMINE (BENADRYL) 25 MG Tab Take 25 mg by mouth as needed for Sleep.       No facility-administered encounter medications on file as of 6/20/2018.      Review of Systems   Cardiovascular: Negative.    Neurological: Positive for headaches.   All other systems reviewed and are negative.       Objective:   /60   Pulse 84   Ht 1.727 m (5' 8\")   Wt 83.9 kg (185 lb)   SpO2 96%   BMI 28.13 kg/m²     Physical Exam   Constitutional: He is oriented to person, place, and time. He appears well-developed and well-nourished. No distress.   Very pleasant middle-aged man in no distress   HENT:   Head: Normocephalic and atraumatic.   Eyes: Conjunctivae and EOM are normal. Pupils are equal, round, and reactive to light. No scleral icterus.   Neck: Neck supple. No JVD present. No tracheal deviation present.   Cardiovascular: Normal rate, regular rhythm, normal heart sounds and intact distal pulses.  Exam reveals no gallop and no friction rub.    No murmur heard.  No carotid bruits   Pulmonary/Chest: Effort normal and breath sounds normal. No stridor. No respiratory distress. He has no wheezes. He has no rales.   Abdominal: Soft. Bowel sounds are normal. He exhibits no distension.   Musculoskeletal: He exhibits no edema.   Neurological: He is alert and oriented to person, place, and time.   Skin: Skin is warm and dry. No rash noted. He is not diaphoretic. No erythema. No pallor.   Psychiatric: He has a normal mood and affect. Judgment and thought content normal.   Vitals reviewed.    Lab Results   Component Value Date/Time    WBC 4.9 02/10/2010 04:15 AM    RBC 4.41 (L) 02/10/2010 04:15 AM    HEMOGLOBIN 12.5 (L) 02/10/2010 04:15 AM    HEMATOCRIT 35.6 (L) 02/10/2010 04:15 AM    MCV 80.6 02/10/2010 04:15 AM    MCH 28.4 02/10/2010 04:15 AM    MCHC 35.2 (H) 02/10/2010 04:15 AM    MPV 6.5 (L) " "02/10/2010 04:15 AM        Lab Results   Component Value Date/Time    SODIUM 135 06/23/2016 12:03 PM    POTASSIUM 4.3 06/23/2016 12:03 PM    CHLORIDE 103 06/23/2016 12:03 PM    CO2 22 06/23/2016 12:03 PM    GLUCOSE 98 06/23/2016 12:03 PM    BUN 21 06/23/2016 12:03 PM    CREATININE 1.15 06/23/2016 12:03 PM        Lab Results   Component Value Date/Time    ASTSGOT 15 02/10/2010 04:15 AM    ALTSGPT 16 02/10/2010 04:15 AM        Lab Results   Component Value Date/Time    CHOLSTRLTOT 155 06/18/2018 09:04 AM    CHOLSTRLTOT 139 02/09/2010 04:00 AM    LDL 76 06/18/2018 09:04 AM    LDL 88 02/09/2010 04:00 AM    HDL 68 06/18/2018 09:04 AM    HDL 32 (L) 02/09/2010 04:00 AM    TRIGLYCERIDE 53 06/18/2018 09:04 AM    TRIGLYCERIDE 94 02/09/2010 04:00 AM         Results for orders placed or performed in visit on 11/16/15   [facility] EKG (Clinic Performed)   Result Value Ref Range    Report SINUS RHYTHM [Remains]     Report NO SIGNIFICANT CHANGE      Labs, 4/7/2017  CBC: WBC 4.0, hemoglobin 14.7, platelets 393  Chemstrip panel: Sodium 139, potassium 4.3, chloride 105, CO2 27, BUN 17, creatinine 1.1, glucose 97  LFTs: AST 20, ALT 14, alkaline phosphatase 105, albumin 4.2, total protein 7.6, total bili  melissa 0.6  Lipids: Calculated , HDL 60, triglycerides 103, total cholesterol 190    [facility]  Chemistry panel: Sodium 140, potassium 3.8, chloride not reported, CO2 also not reported, BUN 15, creatinine 1.1, glucose 95, calcium 9.0  LFTs: AST 19, ALT 14, alkaline phosphatase 104, albumin 4.3, total protein 7.6  Lipids: , HDL 61, triglycerides 89, total cholesterol 191  CBC: WBC 4.8, hemoglobin 14.7, platelets 397    CT coronary calcium scan, 3/26/2018:  \"Coronary calcification:  LMA - 18.8  LCX - 7.4  LAD - 190.2  RCA - 302.6  PDA - 0.0  Calcium score:  519\"    Assessment:     1. Coronary artery calcification seen on CAT scan     2. Essential hypertension, benign     3. Mixed hyperlipidemia         Medical " Decision Making:  Today's Assessment / Status / Plan:     He is doing well and has no cardiovascular complaints as usual.  He comes in to review the results of the CT coronary calcium scan that did show moderately calcified coronary arteries related to which I changed his simvastatin to atorvastatin 40 mg p.o. nightly.  On that therapy is LDL is 76 mg/dL much closer to my goal of 70 mg/dL.  I recommend at this point diet and exerciseed to get him to goal on his lipids.  His blood pressure is well controlled, and I have made no other changes to his medical regimen nor ordered additional testing at this time.  Specifically, I do not think he needs a stress test on the basis of his coronary calcifications.    Mukul Hernandez MD  Cardiologist, [facility] [place]     Return in about 1 year (around 6/20/2019).    Physical Exam   Constitutional: He is oriented to person, place, and time. He appears well-developed and well-nourished. No distress.   Very pleasant middle-aged man in no distress   HENT:   Head: Normocephalic and atraumatic.   Eyes: Conjunctivae and EOM are normal. Pupils are equal, round, and reactive to light. No scleral icterus.   Neck: Neck supple. No JVD present. No tracheal deviation present.   Cardiovascular: Normal rate, regular rhythm, normal heart sounds and intact distal pulses.  Exam reveals no gallop and no friction rub.    No murmur heard.  No carotid bruits   Pulmonary/Chest: Effort normal and breath sounds normal. No stridor. No respiratory distress. He has no wheezes. He has no rales.   Abdominal: Soft. Bowel sounds are normal. He exhibits no distension.   Musculoskeletal: He exhibits no edema.   Neurological: He is alert and oriented to person, place, and time.   Skin: Skin is warm and dry. No rash noted. He is not diaphoretic. No erythema. No pallor.   Psychiatric: He has a normal mood and affect. Judgment and thought content normal.   Vitals reviewed.

## 2018-11-02 ENCOUNTER — HOSPITAL ENCOUNTER (OUTPATIENT)
Dept: LAB | Facility: MEDICAL CENTER | Age: 68
End: 2018-11-02
Attending: FAMILY MEDICINE
Payer: MEDICARE

## 2018-11-02 LAB
ALBUMIN SERPL BCP-MCNC: 4.2 G/DL (ref 3.2–4.9)
ALBUMIN/GLOB SERPL: 1.4 G/DL
ALP SERPL-CCNC: 118 U/L (ref 30–99)
ALT SERPL-CCNC: 25 U/L (ref 2–50)
ANION GAP SERPL CALC-SCNC: 6 MMOL/L (ref 0–11.9)
AST SERPL-CCNC: 25 U/L (ref 12–45)
BILIRUB SERPL-MCNC: 0.8 MG/DL (ref 0.1–1.5)
BUN SERPL-MCNC: 19 MG/DL (ref 8–22)
CALCIUM SERPL-MCNC: 9.5 MG/DL (ref 8.5–10.5)
CHLORIDE SERPL-SCNC: 104 MMOL/L (ref 96–112)
CO2 SERPL-SCNC: 27 MMOL/L (ref 20–33)
CREAT SERPL-MCNC: 1.01 MG/DL (ref 0.5–1.4)
CREAT UR-MCNC: 133.7 MG/DL
EST. AVERAGE GLUCOSE BLD GHB EST-MCNC: 131 MG/DL
FASTING STATUS PATIENT QL REPORTED: NORMAL
GLOBULIN SER CALC-MCNC: 3 G/DL (ref 1.9–3.5)
GLUCOSE SERPL-MCNC: 99 MG/DL (ref 65–99)
HBA1C MFR BLD: 6.2 % (ref 0–5.6)
MICROALBUMIN UR-MCNC: 1 MG/DL
MICROALBUMIN/CREAT UR: 7 MG/G (ref 0–30)
POTASSIUM SERPL-SCNC: 4.7 MMOL/L (ref 3.6–5.5)
PROT SERPL-MCNC: 7.2 G/DL (ref 6–8.2)
SODIUM SERPL-SCNC: 137 MMOL/L (ref 135–145)

## 2018-11-02 PROCEDURE — 36415 COLL VENOUS BLD VENIPUNCTURE: CPT

## 2018-11-02 PROCEDURE — 82043 UR ALBUMIN QUANTITATIVE: CPT

## 2018-11-02 PROCEDURE — 83036 HEMOGLOBIN GLYCOSYLATED A1C: CPT | Mod: GA

## 2018-11-02 PROCEDURE — 80053 COMPREHEN METABOLIC PANEL: CPT

## 2018-11-02 PROCEDURE — 82570 ASSAY OF URINE CREATININE: CPT

## 2018-11-18 ENCOUNTER — APPOINTMENT (OUTPATIENT)
Dept: RADIOLOGY | Facility: MEDICAL CENTER | Age: 68
End: 2018-11-18
Attending: EMERGENCY MEDICINE
Payer: MEDICARE

## 2018-11-18 ENCOUNTER — APPOINTMENT (OUTPATIENT)
Dept: RADIOLOGY | Facility: MEDICAL CENTER | Age: 68
End: 2018-11-18
Attending: UROLOGY
Payer: MEDICARE

## 2018-11-18 ENCOUNTER — HOSPITAL ENCOUNTER (EMERGENCY)
Facility: MEDICAL CENTER | Age: 68
End: 2018-11-18
Attending: EMERGENCY MEDICINE
Payer: MEDICARE

## 2018-11-18 VITALS
OXYGEN SATURATION: 95 % | DIASTOLIC BLOOD PRESSURE: 68 MMHG | WEIGHT: 178 LBS | SYSTOLIC BLOOD PRESSURE: 122 MMHG | HEIGHT: 68 IN | HEART RATE: 72 BPM | TEMPERATURE: 97.5 F | BODY MASS INDEX: 26.98 KG/M2 | RESPIRATION RATE: 14 BRPM

## 2018-11-18 DIAGNOSIS — N20.1 RIGHT URETERAL STONE: Primary | ICD-10-CM

## 2018-11-18 DIAGNOSIS — N20.0 RENAL STONE: ICD-10-CM

## 2018-11-18 DIAGNOSIS — R10.9 FLANK PAIN: ICD-10-CM

## 2018-11-18 DIAGNOSIS — N23 RENAL COLIC: ICD-10-CM

## 2018-11-18 DIAGNOSIS — N23 RENAL COLIC ON RIGHT SIDE: ICD-10-CM

## 2018-11-18 LAB
ALBUMIN SERPL BCP-MCNC: 3.9 G/DL (ref 3.2–4.9)
ALBUMIN/GLOB SERPL: 1.3 G/DL
ALP SERPL-CCNC: 114 U/L (ref 30–99)
ALT SERPL-CCNC: 26 U/L (ref 2–50)
ANION GAP SERPL CALC-SCNC: 12 MMOL/L (ref 0–11.9)
APPEARANCE UR: CLEAR
AST SERPL-CCNC: 33 U/L (ref 12–45)
BASOPHILS # BLD AUTO: 0.9 % (ref 0–1.8)
BASOPHILS # BLD: 0.07 K/UL (ref 0–0.12)
BILIRUB SERPL-MCNC: 0.7 MG/DL (ref 0.1–1.5)
BILIRUB UR QL STRIP.AUTO: NEGATIVE
BUN SERPL-MCNC: 24 MG/DL (ref 8–22)
CALCIUM SERPL-MCNC: 9.4 MG/DL (ref 8.4–10.2)
CHLORIDE SERPL-SCNC: 102 MMOL/L (ref 96–112)
CO2 SERPL-SCNC: 20 MMOL/L (ref 20–33)
COLOR UR: YELLOW
CREAT SERPL-MCNC: 1.4 MG/DL (ref 0.5–1.4)
EOSINOPHIL # BLD AUTO: 0.04 K/UL (ref 0–0.51)
EOSINOPHIL NFR BLD: 0.5 % (ref 0–6.9)
EPI CELLS #/AREA URNS HPF: ABNORMAL /HPF
ERYTHROCYTE [DISTWIDTH] IN BLOOD BY AUTOMATED COUNT: 38.9 FL (ref 35.9–50)
GLOBULIN SER CALC-MCNC: 3.1 G/DL (ref 1.9–3.5)
GLUCOSE SERPL-MCNC: 162 MG/DL (ref 65–99)
GLUCOSE UR STRIP.AUTO-MCNC: NEGATIVE MG/DL
HCT VFR BLD AUTO: 37.7 % (ref 42–52)
HGB BLD-MCNC: 13.2 G/DL (ref 14–18)
IMM GRANULOCYTES # BLD AUTO: 0.03 K/UL (ref 0–0.11)
IMM GRANULOCYTES NFR BLD AUTO: 0.4 % (ref 0–0.9)
KETONES UR STRIP.AUTO-MCNC: 15 MG/DL
LACTATE BLD-SCNC: 2.5 MMOL/L (ref 0.5–2)
LEUKOCYTE ESTERASE UR QL STRIP.AUTO: NEGATIVE
LIPASE SERPL-CCNC: 29 U/L (ref 7–58)
LYMPHOCYTES # BLD AUTO: 2.68 K/UL (ref 1–4.8)
LYMPHOCYTES NFR BLD: 33.7 % (ref 22–41)
MCH RBC QN AUTO: 29.1 PG (ref 27–33)
MCHC RBC AUTO-ENTMCNC: 35 G/DL (ref 33.7–35.3)
MCV RBC AUTO: 83.2 FL (ref 81.4–97.8)
MICRO URNS: ABNORMAL
MONOCYTES # BLD AUTO: 0.44 K/UL (ref 0–0.85)
MONOCYTES NFR BLD AUTO: 5.5 % (ref 0–13.4)
MUCOUS THREADS #/AREA URNS HPF: ABNORMAL /HPF
NEUTROPHILS # BLD AUTO: 4.69 K/UL (ref 1.82–7.42)
NEUTROPHILS NFR BLD: 59 % (ref 44–72)
NITRITE UR QL STRIP.AUTO: NEGATIVE
NRBC # BLD AUTO: 0 K/UL
NRBC BLD-RTO: 0 /100 WBC
PATHOLOGY CONSULT NOTE: NORMAL
PH UR STRIP.AUTO: 5.5 [PH]
PLATELET # BLD AUTO: 355 K/UL (ref 164–446)
PMV BLD AUTO: 9.1 FL (ref 9–12.9)
POTASSIUM SERPL-SCNC: 3.5 MMOL/L (ref 3.6–5.5)
PROT SERPL-MCNC: 7 G/DL (ref 6–8.2)
PROT UR QL STRIP: NEGATIVE MG/DL
RBC # BLD AUTO: 4.53 M/UL (ref 4.7–6.1)
RBC # URNS HPF: ABNORMAL /HPF
RBC UR QL AUTO: ABNORMAL
SODIUM SERPL-SCNC: 134 MMOL/L (ref 135–145)
SP GR UR REFRACTOMETRY: 1.03
WBC # BLD AUTO: 8 K/UL (ref 4.8–10.8)

## 2018-11-18 PROCEDURE — 160039 HCHG SURGERY MINUTES - EA ADDL 1 MIN LEVEL 3: Performed by: UROLOGY

## 2018-11-18 PROCEDURE — 74177 CT ABD & PELVIS W/CONTRAST: CPT

## 2018-11-18 PROCEDURE — 80053 COMPREHEN METABOLIC PANEL: CPT

## 2018-11-18 PROCEDURE — 96365 THER/PROPH/DIAG IV INF INIT: CPT | Mod: XU

## 2018-11-18 PROCEDURE — 82365 CALCULUS SPECTROSCOPY: CPT

## 2018-11-18 PROCEDURE — 36415 COLL VENOUS BLD VENIPUNCTURE: CPT

## 2018-11-18 PROCEDURE — 500062 HCHG BASKET: Performed by: UROLOGY

## 2018-11-18 PROCEDURE — 88300 SURGICAL PATH GROSS: CPT

## 2018-11-18 PROCEDURE — 700117 HCHG RX CONTRAST REV CODE 255: Performed by: EMERGENCY MEDICINE

## 2018-11-18 PROCEDURE — 500879 HCHG PACK, CYSTO: Performed by: UROLOGY

## 2018-11-18 PROCEDURE — 160036 HCHG PACU - EA ADDL 30 MINS PHASE I: Performed by: UROLOGY

## 2018-11-18 PROCEDURE — 500880 HCHG PACK, CYSTO W/SEP LEGGINGS: Performed by: UROLOGY

## 2018-11-18 PROCEDURE — 96375 TX/PRO/DX INJ NEW DRUG ADDON: CPT | Mod: XU

## 2018-11-18 PROCEDURE — C2617 STENT, NON-COR, TEM W/O DEL: HCPCS | Performed by: UROLOGY

## 2018-11-18 PROCEDURE — 700101 HCHG RX REV CODE 250

## 2018-11-18 PROCEDURE — 501329 HCHG SET, CYSTO IRRIG Y TUR: Performed by: UROLOGY

## 2018-11-18 PROCEDURE — 99291 CRITICAL CARE FIRST HOUR: CPT

## 2018-11-18 PROCEDURE — 700111 HCHG RX REV CODE 636 W/ 250 OVERRIDE (IP): Performed by: UROLOGY

## 2018-11-18 PROCEDURE — 83605 ASSAY OF LACTIC ACID: CPT

## 2018-11-18 PROCEDURE — 700105 HCHG RX REV CODE 258: Performed by: UROLOGY

## 2018-11-18 PROCEDURE — 85025 COMPLETE CBC W/AUTO DIFF WBC: CPT

## 2018-11-18 PROCEDURE — 160002 HCHG RECOVERY MINUTES (STAT): Performed by: UROLOGY

## 2018-11-18 PROCEDURE — 160048 HCHG OR STATISTICAL LEVEL 1-5: Performed by: UROLOGY

## 2018-11-18 PROCEDURE — 700111 HCHG RX REV CODE 636 W/ 250 OVERRIDE (IP)

## 2018-11-18 PROCEDURE — 700111 HCHG RX REV CODE 636 W/ 250 OVERRIDE (IP): Performed by: EMERGENCY MEDICINE

## 2018-11-18 PROCEDURE — 160009 HCHG ANES TIME/MIN: Performed by: UROLOGY

## 2018-11-18 PROCEDURE — 160035 HCHG PACU - 1ST 60 MINS PHASE I: Performed by: UROLOGY

## 2018-11-18 PROCEDURE — 81001 URINALYSIS AUTO W/SCOPE: CPT

## 2018-11-18 PROCEDURE — 700105 HCHG RX REV CODE 258: Performed by: EMERGENCY MEDICINE

## 2018-11-18 PROCEDURE — 83690 ASSAY OF LIPASE: CPT

## 2018-11-18 PROCEDURE — 160028 HCHG SURGERY MINUTES - 1ST 30 MINS LEVEL 3: Performed by: UROLOGY

## 2018-11-18 DEVICE — STENT UROLOGICAL POLARIS 6X26  ULTRA: Type: IMPLANTABLE DEVICE | Site: URETER | Status: FUNCTIONAL

## 2018-11-18 RX ORDER — ONDANSETRON 2 MG/ML
4 INJECTION INTRAMUSCULAR; INTRAVENOUS ONCE
Status: COMPLETED | OUTPATIENT
Start: 2018-11-18 | End: 2018-11-18

## 2018-11-18 RX ORDER — MIDAZOLAM HYDROCHLORIDE 1 MG/ML
INJECTION INTRAMUSCULAR; INTRAVENOUS
Status: DISCONTINUED
Start: 2018-11-18 | End: 2018-11-18 | Stop reason: HOSPADM

## 2018-11-18 RX ORDER — KETOROLAC TROMETHAMINE 30 MG/ML
10 INJECTION, SOLUTION INTRAMUSCULAR; INTRAVENOUS ONCE
Status: COMPLETED | OUTPATIENT
Start: 2018-11-18 | End: 2018-11-18

## 2018-11-18 RX ORDER — TAMSULOSIN HYDROCHLORIDE 0.4 MG/1
0.4 CAPSULE ORAL DAILY
Qty: 30 CAP | Refills: 0 | Status: SHIPPED | OUTPATIENT
Start: 2018-11-18 | End: 2019-03-15

## 2018-11-18 RX ORDER — CHLORAL HYDRATE 500 MG
1000 CAPSULE ORAL 2 TIMES DAILY
COMMUNITY
End: 2022-04-26

## 2018-11-18 RX ORDER — HYDRALAZINE HYDROCHLORIDE 20 MG/ML
5 INJECTION INTRAMUSCULAR; INTRAVENOUS
Status: DISCONTINUED | OUTPATIENT
Start: 2018-11-18 | End: 2018-11-18 | Stop reason: HOSPADM

## 2018-11-18 RX ORDER — OXYCODONE HYDROCHLORIDE 5 MG/1
5 TABLET ORAL
Status: DISCONTINUED | OUTPATIENT
Start: 2018-11-18 | End: 2018-11-18 | Stop reason: HOSPADM

## 2018-11-18 RX ORDER — POLYETHYLENE GLYCOL 3350 17 G/17G
17 POWDER, FOR SOLUTION ORAL DAILY
Qty: 30 EACH | Refills: 0 | Status: SHIPPED | OUTPATIENT
Start: 2018-11-18 | End: 2019-03-15

## 2018-11-18 RX ORDER — OXYCODONE HCL 5 MG/5 ML
5 SOLUTION, ORAL ORAL
Status: DISCONTINUED | OUTPATIENT
Start: 2018-11-18 | End: 2018-11-18 | Stop reason: HOSPADM

## 2018-11-18 RX ORDER — HYDROMORPHONE HYDROCHLORIDE 2 MG/ML
0.4 INJECTION, SOLUTION INTRAMUSCULAR; INTRAVENOUS; SUBCUTANEOUS
Status: DISCONTINUED | OUTPATIENT
Start: 2018-11-18 | End: 2018-11-18 | Stop reason: HOSPADM

## 2018-11-18 RX ORDER — HALOPERIDOL 5 MG/ML
1 INJECTION INTRAMUSCULAR
Status: DISCONTINUED | OUTPATIENT
Start: 2018-11-18 | End: 2018-11-18 | Stop reason: HOSPADM

## 2018-11-18 RX ORDER — OXYCODONE HYDROCHLORIDE 5 MG/1
5-10 TABLET ORAL EVERY 4 HOURS PRN
Qty: 15 TAB | Refills: 0 | Status: SHIPPED | OUTPATIENT
Start: 2018-11-18 | End: 2018-11-23

## 2018-11-18 RX ORDER — DIPHENHYDRAMINE HYDROCHLORIDE 50 MG/ML
12.5 INJECTION INTRAMUSCULAR; INTRAVENOUS
Status: DISCONTINUED | OUTPATIENT
Start: 2018-11-18 | End: 2018-11-18 | Stop reason: HOSPADM

## 2018-11-18 RX ORDER — ONDANSETRON 2 MG/ML
4 INJECTION INTRAMUSCULAR; INTRAVENOUS
Status: DISCONTINUED | OUTPATIENT
Start: 2018-11-18 | End: 2018-11-18 | Stop reason: HOSPADM

## 2018-11-18 RX ORDER — SODIUM CHLORIDE 9 MG/ML
1000 INJECTION, SOLUTION INTRAVENOUS ONCE
Status: COMPLETED | OUTPATIENT
Start: 2018-11-18 | End: 2018-11-18

## 2018-11-18 RX ORDER — MORPHINE SULFATE 10 MG/ML
6 INJECTION, SOLUTION INTRAMUSCULAR; INTRAVENOUS ONCE
Status: COMPLETED | OUTPATIENT
Start: 2018-11-18 | End: 2018-11-18

## 2018-11-18 RX ORDER — SODIUM CHLORIDE, SODIUM LACTATE, POTASSIUM CHLORIDE, CALCIUM CHLORIDE 600; 310; 30; 20 MG/100ML; MG/100ML; MG/100ML; MG/100ML
INJECTION, SOLUTION INTRAVENOUS CONTINUOUS
Status: DISCONTINUED | OUTPATIENT
Start: 2018-11-18 | End: 2018-11-18 | Stop reason: HOSPADM

## 2018-11-18 RX ORDER — PHENAZOPYRIDINE HYDROCHLORIDE 200 MG/1
200 TABLET, FILM COATED ORAL 3 TIMES DAILY PRN
Qty: 9 TAB | Refills: 0 | Status: SHIPPED | OUTPATIENT
Start: 2018-11-18 | End: 2018-11-21

## 2018-11-18 RX ORDER — LABETALOL HYDROCHLORIDE 5 MG/ML
5 INJECTION, SOLUTION INTRAVENOUS
Status: DISCONTINUED | OUTPATIENT
Start: 2018-11-18 | End: 2018-11-18 | Stop reason: HOSPADM

## 2018-11-18 RX ORDER — HYDROMORPHONE HYDROCHLORIDE 2 MG/ML
0.1 INJECTION, SOLUTION INTRAMUSCULAR; INTRAVENOUS; SUBCUTANEOUS
Status: DISCONTINUED | OUTPATIENT
Start: 2018-11-18 | End: 2018-11-18 | Stop reason: HOSPADM

## 2018-11-18 RX ORDER — OXYCODONE HCL 5 MG/5 ML
10 SOLUTION, ORAL ORAL
Status: DISCONTINUED | OUTPATIENT
Start: 2018-11-18 | End: 2018-11-18 | Stop reason: HOSPADM

## 2018-11-18 RX ORDER — OXYCODONE HYDROCHLORIDE 10 MG/1
10 TABLET ORAL
Status: DISCONTINUED | OUTPATIENT
Start: 2018-11-18 | End: 2018-11-18 | Stop reason: HOSPADM

## 2018-11-18 RX ORDER — HYDROMORPHONE HYDROCHLORIDE 2 MG/ML
0.2 INJECTION, SOLUTION INTRAMUSCULAR; INTRAVENOUS; SUBCUTANEOUS
Status: DISCONTINUED | OUTPATIENT
Start: 2018-11-18 | End: 2018-11-18 | Stop reason: HOSPADM

## 2018-11-18 RX ADMIN — MORPHINE SULFATE 6 MG: 10 INJECTION INTRAVENOUS at 04:34

## 2018-11-18 RX ADMIN — ONDANSETRON 4 MG: 2 INJECTION INTRAMUSCULAR; INTRAVENOUS at 04:34

## 2018-11-18 RX ADMIN — SODIUM CHLORIDE 2 G: 9 INJECTION, SOLUTION INTRAVENOUS at 08:27

## 2018-11-18 RX ADMIN — KETOROLAC TROMETHAMINE 9.99 MG: 30 INJECTION, SOLUTION INTRAMUSCULAR at 06:09

## 2018-11-18 RX ADMIN — IOHEXOL 100 ML: 350 INJECTION, SOLUTION INTRAVENOUS at 05:45

## 2018-11-18 RX ADMIN — SODIUM CHLORIDE 1000 ML: 9 INJECTION, SOLUTION INTRAVENOUS at 08:12

## 2018-11-18 RX ADMIN — SODIUM CHLORIDE 1000 ML: 9 INJECTION, SOLUTION INTRAVENOUS at 04:48

## 2018-11-18 ASSESSMENT — PAIN SCALES - GENERAL
PAINLEVEL_OUTOF10: 0

## 2018-11-18 ASSESSMENT — ENCOUNTER SYMPTOMS
FEVER: 0
NAUSEA: 0
SHORTNESS OF BREATH: 0
VOMITING: 0
CHILLS: 0
FLANK PAIN: 1
BACK PAIN: 1

## 2018-11-18 NOTE — ED PROVIDER NOTES
"ED Provider Note    CHIEF COMPLAINT  Chief Complaint   Patient presents with   • Constipation     the pt is c/o rectal pain radiating to r side, \" i just feel blocked up, like i need an enema \".        HPI  HPI  Patient is a 67-year-old male with a dyslipidemia, hypertension and chronic constipation who presents the emergency room for evaluation of severe lower abdominal pain and rectal pain.  Patient states that over the course of the last day he has had worsening pressure like he has a large sensation that he needs to defecate.  This pain has persisted and now experienced throughout his abdomen.  He says is worse on the right flank in the left lower portion of his buttock.  He says it occasionally feels like a burning or a tearing sensation throughout his abdomen.  He says there are times when he has a dullness and ache in his abdomen however it limited towards the left lower quadrant of his belly.  He denies any chest pain, abdominal distention or epigastric discomfort.  He denies any nausea or vomiting with these episodes that he most recently had a bowel movement within the last 24 hours that was somewhat soft.  He denies any recent instrumentation, no fevers chills or recent illness. Denies hx of kidney stones      REVIEW OF SYSTEMS  See HPI for further details. All other systems are negative.     ROS  Constitutional: as above  Skin: No rashes or diaphoresis.  Eyes: No change in vision, no discharge.  Respiratory: No SOB. No coughing or hemoptysis. No Wheezing.  Cardiac: No CP, palpitations, edema. No PND or orthopnea.  GI: as above.  Denies diarrhea, blood in stool.  : No dysuria. No D/C. No frequency or urgency.  MSK: No pain in joints or muscles. No calf pain or swelling.  Neuro: No HA or paresthesias. No focal weakness.  Endocrine: No polyuria or polydipsia. No heat or cold intolerance.  Heme: No easy bruising. No history of bleeding disorders or anemia.    PAST MEDICAL HISTORY   has a past medical " "history of Anxiety; Arthritis; Backpain; Degenerative joint disease of spine; Essential hypertension, benign (12/21/2015); Indigestion; and Psychiatric disorder.    SOCIAL HISTORY  Social History     Social History Main Topics   • Smoking status: Current Some Day Smoker     Types: Cigars   • Smokeless tobacco: Never Used      Comment: 1-2 cigars per month   • Alcohol use 7.2 oz/week     12 Cans of beer per week      Comment: 1-2 beers per week   • Drug use: No   • Sexual activity: Yes     SURGICAL HISTORY   has a past surgical history that includes other orthopedic surgery.    CURRENT MEDICATIONS  Home Medications    **Home medications have not yet been reviewed for this encounter**         ALLERGIES  Allergies   Allergen Reactions   • Penicillins        PHYSICAL EXAM  VITAL SIGNS: /77   Pulse 81   Temp 36.5 °C (97.7 °F) (Temporal)   Resp (!) 24   Ht 1.727 m (5' 8\")   Wt 80.7 kg (178 lb)   SpO2 99%   BMI 27.06 kg/m²  @MALICK[[id number]::@   Pulse ox interpretation: I interpret this pulse ox as normal.  Physical Exam  Genl: Anxious appearing male, lying in bed with occasional writhing in pain.  When he stops to explain his pain he is calm, speaking clearly, appears in no acute distress  Head: NC/AT  ENT: Mucous membranes moist, posterior pharynx clear, uvula midline, nares patent bilaterally  Eyes: Normal sclera, pupils equal round reactive to light  Neck: Supple, FROM, no LAD appreciated  Pulmonary: Lungs are clear to auscultation bilaterally  Chest: No TTP  CV:  RRR, no murmur appreciated, pulses 2+ in both upper and lower extremities  Abdomen: obese and soft, globally tender, worse in LLQ.  ND; no rebound/guarding, no masses palpated, no HSM   : no CVA tenderness, + suprapubic tenderness.  Rectal exam is remarkable for a external hemorrhoid at the 9 o'clock position that is not acutely inflamed or thrombosed.  There is no perirectal fullness or abnormal sensations of the prostate.  There is no appreciable " stool in the rectal vault.  No gross blood  Musculoskeletal: Pain free ROM of the neck. Moving upper and lower extremities and spontaneous in coordinated fashion  Neuro: A&Ox4 (person, place, time, situation), speech fluent, gait steady, no focal deficits appreciated, No cerebellar signs. Sensation is grossly intact in the distal upper and lower extremities.  5/5 strength in  and dorsiflexion/plantar flexion of the ankles  Psych: Patient has an appropriate affect and behavior  Skin: No rash or lesions.  No pallor or jaundice.  No cyanosis.  Warm and dry.     DIAGNOSTIC STUDIES / PROCEDURES    Results for orders placed or performed during the hospital encounter of 11/18/18   CBC WITH DIFFERENTIAL   Result Value Ref Range    WBC 8.0 4.8 - 10.8 K/uL    RBC 4.53 (L) 4.70 - 6.10 M/uL    Hemoglobin 13.2 (L) 14.0 - 18.0 g/dL    Hematocrit 37.7 (L) 42.0 - 52.0 %    MCV 83.2 81.4 - 97.8 fL    MCH 29.1 27.0 - 33.0 pg    MCHC 35.0 33.7 - 35.3 g/dL    RDW 38.9 35.9 - 50.0 fL    Platelet Count 355 164 - 446 K/uL    MPV 9.1 9.0 - 12.9 fL    Neutrophils-Polys 59.00 44.00 - 72.00 %    Lymphocytes 33.70 22.00 - 41.00 %    Monocytes 5.50 0.00 - 13.40 %    Eosinophils 0.50 0.00 - 6.90 %    Basophils 0.90 0.00 - 1.80 %    Immature Granulocytes 0.40 0.00 - 0.90 %    Nucleated RBC 0.00 /100 WBC    Neutrophils (Absolute) 4.69 1.82 - 7.42 K/uL    Lymphs (Absolute) 2.68 1.00 - 4.80 K/uL    Monos (Absolute) 0.44 0.00 - 0.85 K/uL    Eos (Absolute) 0.04 0.00 - 0.51 K/uL    Baso (Absolute) 0.07 0.00 - 0.12 K/uL    Immature Granulocytes (abs) 0.03 0.00 - 0.11 K/uL    NRBC (Absolute) 0.00 K/uL   COMP METABOLIC PANEL   Result Value Ref Range    Sodium 134 (L) 135 - 145 mmol/L    Potassium 3.5 (L) 3.6 - 5.5 mmol/L    Chloride 102 96 - 112 mmol/L    Co2 20 20 - 33 mmol/L    Anion Gap 12.0 (H) 0.0 - 11.9    Glucose 162 (H) 65 - 99 mg/dL    Bun 24 (H) 8 - 22 mg/dL    Creatinine 1.40 0.50 - 1.40 mg/dL    Calcium 9.4 8.4 - 10.2 mg/dL    AST(SGOT)  33 12 - 45 U/L    ALT(SGPT) 26 2 - 50 U/L    Alkaline Phosphatase 114 (H) 30 - 99 U/L    Total Bilirubin 0.7 0.1 - 1.5 mg/dL    Albumin 3.9 3.2 - 4.9 g/dL    Total Protein 7.0 6.0 - 8.2 g/dL    Globulin 3.1 1.9 - 3.5 g/dL    A-G Ratio 1.3 g/dL   LIPASE   Result Value Ref Range    Lipase 29 7 - 58 U/L   LACTIC ACID   Result Value Ref Range    Lactic Acid 2.5 (H) 0.5 - 2.0 mmol/L   URINALYSIS CULTURE, IF INDICATED   Result Value Ref Range    Micro Urine Req Microscopic     Color Yellow     Character Clear     Ph 5.5 5.0 - 8.0    Glucose Negative Negative mg/dL    Ketones 15 (A) Negative mg/dL    Protein Negative Negative mg/dL    Bilirubin Negative Negative    Nitrite Negative Negative    Leukocyte Esterase Negative Negative    Occult Blood Small (A) Negative   REFRACTOMETER SG   Result Value Ref Range    Specific Gravity 1.026    URINE MICROSCOPIC (W/UA)   Result Value Ref Range    RBC 5-10 (A) /hpf    Epithelial Cells Rare Few /hpf    Mucous Threads Few /hpf   ESTIMATED GFR   Result Value Ref Range    GFR If African American >60 >60 mL/min/1.73 m 2    GFR If Non African American 50 (A) >60 mL/min/1.73 m 2       RADIOLOGY  CT abd pelvis w/:  1.  Moderate to severe right hydronephrosis secondary to a large UVJ stone.  2.  No other acute findings.  3.  Colonic diverticulosis.    COURSE & MEDICAL DECISION MAKING  Pertinent Labs & Imaging studies reviewed. (See chart for details)    DDX:  Aortic dissection  Aortic aneurysm  Mesenteric ischemia  Pancreatitis  Cholecystitis  Cholelithiasis  Nephrolithiasis  Appendicitis  Diverticulitis  Hernia  Constipation    MDM    Initial evaluation at 0350:  Patient presented to the emergency room for acute diffuse abdominal pain.  The patient was pointing to large portions of his abdomen and noting that it radiated all over.  When further pressed it was noted that the patient's pain is been going on for some time and was acutely worse in the setting of several days.  Was initially  concerned as his abdominal exam was relatively benign on palpation and he had substantial pain and was writhing around in the gurney.  I was concerned about the possibility of a dissection versus mesenteric ischemia.  He did appear slightly dehydrated on clinical exam likely due to his decreased oral intake.  Labs were drawn for the broad differential as noted above or remarkable for increased lactate of 2.5, hematuria, and slight anemia.  Otherwise there is no gross metabolic derangement.  Initial bolus of IV morphine antiemetics only slightly reduced his pain.  While the possibility of a kidney stone was considered as more concerned about a vascular abnormality was initiated.  Results of the urinalysis showing hematuria were identified after this was initiated.  The patient was found to have a 4 mm x 7 mm stone at the right UVJ with moderate to severe amounts of hydronephrosis and perinephric stranding.  There is no evidence of acute bacterial infection on urinalysis.  I was concerned regarding the duration of his symptoms urology.      Spoke with Dr. Law (urology) regarding findings on CT scan.  He would like the pt admitted to medicine for planned operative intervention today.  Urology call me back shortly thereafter and noted that they may be able to take him early this morning without admission.  Hospitalist was notified of the plan to go straight to the OR and likely discharge home from there however they have been repaired regarding the possibility of needing admission if no emergent surgical intervention can be obtained.  Patient remained stable at the time of signout and is pending further evaluation by urology at this time    HYDRATION: Based on the patient's presentation of Inability to take oral fluids the patient was given IV fluids. IV Hydration was used because oral hydration was not adequate alone. Upon recheck following hydration, the patient was improved.    FINAL IMPRESSION  Visit Diagnoses      ICD-10-CM   1. Renal colic N23   2. Renal stone N20.0   3. Flank pain R10.9     Electronically signed by: Yang Gentile, 11/18/2018 3:43 AM

## 2018-11-18 NOTE — DISCHARGE INSTRUCTIONS
ACTIVITY: Rest and take it easy for the first 24 hours.  A responsible adult is recommended to remain with you during that time.  It is normal to feel sleepy.  We encourage you to not do anything that requires balance, judgment or coordination.    MILD FLU-LIKE SYMPTOMS ARE NORMAL. YOU MAY EXPERIENCE GENERALIZED MUSCLE ACHES, THROAT IRRITATION, HEADACHE AND/OR SOME NAUSEA.    FOR 24 HOURS DO NOT:  Drive, operate machinery or run household appliances.  Drink beer or alcoholic beverages.   Make important decisions or sign legal documents.    SPECIAL INSTRUCTIONS:     Dr. Law' Discharge Instructions FOLLOWING Ureteroscopy and laser lithotripsy      DIET:  You can resume your regular diet. We encourage you to eat well-balanced and nutritious meals.      ACTIVITY:  Please restrain from strenuous activity or heavy lifting (more than 10 pounds) for the next week.  Please walk daily as much as tolerated, making exercise a part of your daily life. Do not drive while using narcotics for pain control.     WOUND CARE:  1. You have no dressing or open wounds to manage.   2. You do have a internal ureteral stent on strings.  Please do not pull these strings as they will be used at your follow up visit to remove the stent.     MEDICATIONS:  1. Please use over the counter Tylenol or Ibuprofen for pain control as needed  2. You may take 3 days of pyridium as prescribed for numbing the bladder and burning with urination.  3a. f you have severe pain refractory to Ibuprofen you may use oxycodone for pain control as well as prescribed. Use stool softeners as given with this.  3c. Please use flomax daily as prescribed while you have a stent in place to lessen stone pain.   4. If you are using aspirin, Plavix, or coumadin, please don't restart these medications until two days after your discharge if you are not having large amounts of blood in the urine.    FOLLOW-UP:  We will call you to schedule your follow up appointment in 5-7  days for stent removal on strings and in 4 weeks with me. If you have not heard from us in 1-2 business days, please call [phone] to schedule your follow-up appointment. You may also contact this number if you have questions or concerns that can be answered by Dr. Law' staff.      WARNING SIGNS:  Fever greater than 101 degrees Fahrenheit, chills, nausea or vomiting, Large amount of clots in urine that make it difficult to urinate or for urine to drain from keen, increasing pain, or abdominal swelling. If you are experiencing these symptoms, call the Urology Clinic or go to your local PCP or emergency room.    It is normal to see blood in your urine for up to 2 weeks even from surgery. The urine may clear up entirely, and then turn bloody again a few days later depending on your activity level; do not be alarmed. However, if you experience severe pain or tenderness, have a lot of increased bleeding, or find that you are unable to urinate because of large clots, please notify your doctor immediately     MEDICAL HELP DURING NORMAL BUSINESS HOURS  Between the hours of 7 AM and 7 PM, please call [phone] to speak with Dr. Jai Law' staff.     MEDICAL HELP AFTER HOURS  If you have a serious emergency such as chest pain, shortness of breath, relentless pain you should call [phone]. For other urgent problems after hours you may contact the urology physician on call by phoning the [phone]. You may also visit the Emergency room at local hospital for help.   For non-emergent problems such as prescription refills or routine questions, please do your best to contact us during normal business hours. This after-hours number should be used for urgent or emergent questions only.               DIET: To avoid nausea, slowly advance diet as tolerated, avoiding spicy or greasy foods for the first day.  Add more substantial food to your diet according to your physician's instructions.  Babies can be fed formula or  breast milk as soon as they are hungry.  INCREASE FLUIDS AND FIBER TO AVOID CONSTIPATION.    SURGICAL DRESSING/BATHING: you may shower anytime    FOLLOW-UP APPOINTMENT:  A follow-up appointment should be arranged with your doctor; call to schedule.    You should CALL YOUR PHYSICIAN if you develop:  Fever greater than 101 degrees F.  Pain not relieved by medication, or persistent nausea or vomiting.  Excessive bleeding (blood soaking through dressing) or unexpected drainage from the wound.  Extreme redness or swelling around the incision site, drainage of pus or foul smelling drainage.  Inability to urinate or empty your bladder within 8 hours.  Problems with breathing or chest pain.    You should call 911 if you develop problems with breathing or chest pain.  If you are unable to contact your doctor or surgical center, you should go to the nearest emergency room or urgent care center.  Physician's telephone #: [phone]    If any questions arise, call your doctor.  If your doctor is not available, please feel free to call the Surgical Center at [phone].  The Center is open Monday through Friday from 7AM to 7PM.  You can also call the [facility] HOTLINE open 24 hours/day, 7 days/week and speak to a nurse at [phone], or toll free at [phone].    A registered nurse may call you a few days after your surgery to see how you are doing after your procedure.    MEDICATIONS: Resume taking daily medication.  Take prescribed pain medication with food.  If no medication is prescribed, you may take non-aspirin pain medication if needed.  PAIN MEDICATION CAN BE VERY CONSTIPATING.  Take a stool softener or laxative such as senokot, pericolace, or milk of magnesia if needed.    Prescription given for oxycodone, pyridium, flomax, miralax.  Last pain medication given at n/a. May take pain medication anytime.    If your physician has prescribed pain medication that includes Acetaminophen (Tylenol), do not take additional  Acetaminophen (Tylenol) while taking the prescribed medication.    Depression / Suicide Risk    As you are discharged from this [facility] facility, it is important to learn how to keep safe from harming yourself.    Recognize the warning signs:  · Abrupt changes in personality, positive or negative- including increase in energy   · Giving away possessions  · Change in eating patterns- significant weight changes-  positive or negative  · Change in sleeping patterns- unable to sleep or sleeping all the time   · Unwillingness or inability to communicate  · Depression  · Unusual sadness, discouragement and loneliness  · Talk of wanting to die  · Neglect of personal appearance   · Rebelliousness- reckless behavior  · Withdrawal from people/activities they love  · Confusion- inability to concentrate     If you or a loved one observes any of these behaviors or has concerns about self-harm, here's what you can do:  · Talk about it- your feelings and reasons for harming yourself  · Remove any means that you might use to hurt yourself (examples: pills, rope, extension cords, firearm)  · Get professional help from the community (Mental Health, Substance Abuse, psychological counseling)  · Do not be alone:Call your Safe Contact- someone whom you trust who will be there for you.  · Call your local CRISIS HOTLINE 228-0036 or 429-129-5614  · Call your local Children's Mobile Crisis Response Team [place] (261) 423-9662 or www.Mobilepolice  · Call the toll free National Suicide Prevention Hotlines   · National Suicide Prevention Lifeline 099-150-KZMC (8456)  · National Hope Line Network 800-SUICIDE (125-1878)

## 2018-11-18 NOTE — CONSULTS
[facility] [place] Consult/H&P Note    Primary Service: ER  Attending: Yang Gentile M.D.  Patient's Name/MRN: Nelson Jiménez, [id number]    Admit Date:11/18/2018  Today's Date: 11/18/2018   Length of stay:  LOS: 0 days   Room #: -ROOM 5/05      Reason for consult/chief complaint: R ureteral stone, R renal colic  ID/HPI: Nelson Jiménez is a 67 y.o. male patient with 1 month of abdominal pain, rectal tenusmus and urgency, inability to defecate, R flank pain and lower buttock pain that wont resolve. He denies N/V/F/C and thoguht this was from consitpation which he suffers from chronically. CT scan here in ER shows 7mm obstructing R UVJ stone and no other pathology. We were thus consulted. He did have worse excruciating pain yesterday which is why he came in. Denies urgency, frequency, dysuria.  He has had some fatigue related to this.  During the time of my interview patient now says his pain with IV analgesics is very well controlled.  Canwalk multiple blocks. No MI/CVA. Can ascend multiple flights of stairs.      Past Medical History:   Past Medical History:   Diagnosis Date   • Anxiety    • Arthritis     loulou spine   • Backpain    • Degenerative joint disease of spine    • Essential hypertension, benign 12/21/2015   • Indigestion     GERD   • Psychiatric disorder     depression      Past Surgical History:   Past Surgical History:   Procedure Laterality Date   • OTHER ORTHOPEDIC SURGERY      L little finger fx repair      Family History:   Family History   Problem Relation Age of Onset   • Hypertension Mother    • Hyperlipidemia Mother    • Other Father         psorosis of the liver         Social History:   Social History   Substance Use Topics   • Smoking status: Current Some Day Smoker     Types: Cigars   • Smokeless tobacco: Never Used      Comment: 1-2 cigars per month   • Alcohol use 7.2 oz/week     12 Cans of beer per week      Comment: 1-2 beers per week      Social History     Social History Narrative   • No  "narrative on file        Allergies: he Penicillins    Medications:     (Not in a hospital admission)      Review of Systems  Review of Systems   Constitutional: Negative for chills, fever and malaise/fatigue.   Respiratory: Negative for shortness of breath.    Cardiovascular: Negative for chest pain.   Gastrointestinal: Negative for nausea and vomiting.   Genitourinary: Positive for flank pain and hematuria. Negative for dysuria, frequency and urgency.   Musculoskeletal: Positive for back pain.        Physical Exam  VITAL SIGNS: /77   Pulse 81   Temp 36.5 °C (97.7 °F) (Temporal)   Resp (!) 24   Ht 1.727 m (5' 8\")   Wt 80.7 kg (178 lb)   SpO2 99%   BMI 27.06 kg/m²   GENERAL:  alert, in no acute distress  EYES: EOMI   BACK: minima R CVAT  CHEST AND LUNGS: no audible wheezes  CARDIOVASCULAR: RRR without M/R/G's.  ABDOMEN: Abdomen soft, nontender. No masses, organomegaly.  : bladder flat, urine in urinal without stone, clear yellow urine  EXTREMITIES: Warm and well perfused without c/c/e  NEURO: No focal deficits  SKIN: Skin color, texture, turgor normal. No rashes, lesions, nor jaundice.      Labs:  CBC:   Lab Results   Component Value Date/Time    WBC 8.0 11/18/2018 04:26 AM    HEMOGLOBIN 13.2 (L) 11/18/2018 04:26 AM    HEMATOCRIT 37.7 (L) 11/18/2018 04:26 AM     Glucose:  Lab Results   Component Value Date/Time    GLUCOSE 162 (H) 11/18/2018 04:26 AM    GLUCOSE 99 11/02/2018 07:04 AM    GLUCOSE 98 06/23/2016 12:03 PM    GLUCOSE 122 (H) 02/05/2016 03:45 PM    Electrolytes:  Lab Results   Component Value Date/Time    SODIUM 134 (L) 11/18/2018 04:26 AM    POTASSIUM 3.5 (L) 11/18/2018 04:26 AM    CHLORIDE 102 11/18/2018 04:26 AM    CO2 20 11/18/2018 04:26 AM    GLUCOSE 162 (H) 11/18/2018 04:26 AM    BUN 24 (H) 11/18/2018 04:26 AM    CREATININE 1.40 11/18/2018 04:26 AM    CALCIUM 9.4 11/18/2018 04:26 AM     Coags:  No results found for: INR      Urinalysis:   Lab Results   Component Value Date/Time    " COLORURINE Yellow 11/18/2018 04:30 AM    CLARITY Clear 11/18/2018 04:30 AM    SPECGRAVITY 1.026 11/18/2018 04:30 AM    PHURINE 5.5 11/18/2018 04:30 AM    GLUCOSEUR Negative 11/18/2018 04:30 AM    KETONES 15 (A) 11/18/2018 04:30 AM    NITRITE Negative 11/18/2018 04:30 AM    OCCULTBLOOD Small (A) 11/18/2018 04:30 AM    RBCURINE 5-10 (A) 11/18/2018 04:30 AM    EPITHELCELL Rare 11/18/2018 04:30 AM     Imaging:  CT 11/18/2018  1.  Moderate to severe right hydronephrosis secondary to a large UVJ stone. 7x6mm oblong stone, with hydroureter.  2.  No other acute findings.  3.  Colonic diverticulosis.     Assessment/Recommendation   67 y.o.*M with R obstructing ureteral stone, renal colic, non infected and 1 month of pain. Recommend CUTLS but discussed options including Medical Expulsive Therapy (MET), future ESWL.  Given his pain seems more recently controlled now I do think that MET would be a reasonable approach.  However he has had nearly a month of off and on pain and a stone likely lodged at the UVJ and think it may be prudent just to continue with ureteroscopy.  After a full discussion of the alternatives risks and benefits of the procedure, the patient consented to proceeding with Cystoscopy, RIGHT ureteroscopy, Laser lithotripsy and JJ stents (R CULTS). He understands the risk of inability to access ureter, the need for second procedures, the possibility of negative ureteroscopy, that he may have stent discomfort until this is removed, bleeding, infection, ureteral injury or stricture, bladder injury, post op urinary retention requiring keen catheter, and the general pulmonary and cardiovascular risks associated with anesthesia.      · NPO  · Consent for R CULTS obtained  · Plan for discharge after procedure  · If passes stone while waiting, can cancel procedure.        Jai Law MD  [address] [place] [address]. [id number]  [place] [id number]  [phone]

## 2018-11-18 NOTE — OP REPORT
Urologic Surgery Operative Report     Date of Procedure: 11/18/2018    Pre-op Diagnosis: 1. Right ureteral urinary stone  2.  Right hydronephrosis and renal colic   Post-op Diagnosis: Same as above   Procedure: 1. Cystoscopy, right ureteroscopy w/ laser lithotripsy, JJ stent: [id number]      Surgeon Jai Law MD   Assistant: None   Anesthesia:  LMA, General   Estimated Blood Loss: Minimal   IV fluids <1L Crystalloid   Specimens: 1. Right ureteral Stone for chemical analysis    Complications: None   Condition: Stable, procedure well tolerated    Drains: 1. 3Lr02ay JJ stent (ON strings)  2. No keen   Disposition:  1. PACU, discharge after voiding  2. f/u 5-7 days for stent removal on strings   Findings 1. 6mm stone cm stone at distal R ureteral  2. Very tight UO requiring dilation      Indication:   Nelson 89-year-old male presenting with on and off a month of vague abdominal pain developed severe right renal colic yesterday came to the ER with nausea vomiting and refractory pain..  After a full discussion of alternatives, risks, and benefits the patient consented to proceeding with Ureteroscopy, laser lithotripsy and possible JJ stent placement on the respective side.  He understands the risk of inability to access ureter, the need for second procedures, the possibility of negative ureteroscopy, that he may have stent discomfort until this is removed, bleeding, infection, ureteral injury or stricture, bladder injury, post op urinary retention requiring keen catheter, and the general pulmonary and cardiovascular risks associated with anesthesia.     Procedure Details:   The patient was taken to operating room and placed on table in supine position.  Ancef was administered prior to the start of the procedure based on previous urine cultures. Sequential compression devices were placed for deep venous thrombosis prophylaxis. After induction of general anesthesia, both legs were placed in Mathew stirrups in the  standard lithotomy position.  A timeout was held confirming the correct patient, procedure and laterality.   The perineal area was prepped and draped in a sterile fashion. A 21 Thai rigid cystoscope was inserted into the urethra and the bladder was emptied and then distended with sterile saline. Urethral sounds were not needed to dilate the urethral meatus. Cystoscopy revealed normal bladder mucosa, orthotopic ureteral orifices, and mildly trabeculated bladder with a somewhat enlarged coapting lateral lobes mildly obstructive in appearance..    We passed a wire through the right ureteral orifice into the respective renal pelvis which was visualized under fluoroscopic vision. The wire was moved to the side and a semirigid ureteroscope was placed into the urethra and passed up the ureter until the stone was visualized in the distal ureter. We did need a [place] ureteral dilator to pass the scope into the ureter. The holmium laser was then used to fracture the stone until it was sub millimeter fragments, and using a basket all fragments were fully removed from the ureter.  Repeat ureteroscopy showed no residual stone fragments.  A few small pieces were sent for chemical analysis. .      Returnign to rigid cystoscope, we then placed a right-sided JJ stent, 6Fr x 26 cm under fluoroscopy. We then saw that the JJ stent was in appropriate position, with a good curl visualized in the renal pelvis fluoroscopically and a good curl in the bladder endoscopically. The cystoscope was removed after emptying the bladder.  The patient was awoken from anesthesia and brought to recovery in satisfactory condition.        Jai Law MD  [facility] [place] [address]  [facility], [place] [address]  [phone]

## 2018-11-18 NOTE — ED NOTES
Med Rec complete per Pt at bedside  Allergies Reviewed  No ABX in the last 30 days    Pt takes X4 Prozac 10 mg,  (total dose = 40mg)

## 2018-11-19 PROCEDURE — 82365 CALCULUS SPECTROSCOPY: CPT

## 2018-11-23 LAB
APPEARANCE STONE: NORMAL
COMPN STONE: NORMAL
NUMBER STONE: 3
SIZE STONE: NORMAL MM
SPECIMEN WT: 41 MG

## 2018-12-26 DIAGNOSIS — E78.2 MIXED HYPERLIPIDEMIA: ICD-10-CM

## 2018-12-26 RX ORDER — ATORVASTATIN CALCIUM 40 MG/1
40 TABLET, FILM COATED ORAL
Qty: 90 TAB | Refills: 2 | Status: SHIPPED | OUTPATIENT
Start: 2018-12-26 | End: 2019-09-06 | Stop reason: SDUPTHER

## 2019-01-28 ENCOUNTER — APPOINTMENT (RX ONLY)
Dept: URBAN - METROPOLITAN AREA CLINIC 20 | Facility: CLINIC | Age: 69
Setting detail: DERMATOLOGY
End: 2019-01-28

## 2019-01-28 DIAGNOSIS — L82.0 INFLAMED SEBORRHEIC KERATOSIS: ICD-10-CM

## 2019-01-28 DIAGNOSIS — Z87.2 PERSONAL HISTORY OF DISEASES OF THE SKIN AND SUBCUTANEOUS TISSUE: ICD-10-CM

## 2019-01-28 DIAGNOSIS — L663 OTHER SPECIFIED DISEASES OF HAIR AND HAIR FOLLICLES: ICD-10-CM

## 2019-01-28 DIAGNOSIS — L73.9 FOLLICULAR DISORDER, UNSPECIFIED: ICD-10-CM

## 2019-01-28 DIAGNOSIS — D18.0 HEMANGIOMA: ICD-10-CM

## 2019-01-28 DIAGNOSIS — L82.1 OTHER SEBORRHEIC KERATOSIS: ICD-10-CM

## 2019-01-28 DIAGNOSIS — L738 OTHER SPECIFIED DISEASES OF HAIR AND HAIR FOLLICLES: ICD-10-CM

## 2019-01-28 DIAGNOSIS — L57.8 OTHER SKIN CHANGES DUE TO CHRONIC EXPOSURE TO NONIONIZING RADIATION: ICD-10-CM

## 2019-01-28 DIAGNOSIS — D22 MELANOCYTIC NEVI: ICD-10-CM

## 2019-01-28 DIAGNOSIS — L81.4 OTHER MELANIN HYPERPIGMENTATION: ICD-10-CM

## 2019-01-28 DIAGNOSIS — L91.8 OTHER HYPERTROPHIC DISORDERS OF THE SKIN: ICD-10-CM

## 2019-01-28 PROBLEM — L02.426 FURUNCLE OF LEFT LOWER LIMB: Status: ACTIVE | Noted: 2019-01-28

## 2019-01-28 PROBLEM — D22.5 MELANOCYTIC NEVI OF TRUNK: Status: ACTIVE | Noted: 2019-01-28

## 2019-01-28 PROBLEM — D18.01 HEMANGIOMA OF SKIN AND SUBCUTANEOUS TISSUE: Status: ACTIVE | Noted: 2019-01-28

## 2019-01-28 PROBLEM — L02.425 FURUNCLE OF RIGHT LOWER LIMB: Status: ACTIVE | Noted: 2019-01-28

## 2019-01-28 PROCEDURE — ? COUNSELING

## 2019-01-28 PROCEDURE — 99214 OFFICE O/P EST MOD 30 MIN: CPT | Mod: 25

## 2019-01-28 PROCEDURE — 17110 DESTRUCTION B9 LES UP TO 14: CPT

## 2019-01-28 PROCEDURE — ? LIQUID NITROGEN

## 2019-01-28 ASSESSMENT — LOCATION SIMPLE DESCRIPTION DERM
LOCATION SIMPLE: RIGHT UPPER BACK
LOCATION SIMPLE: RIGHT LOWER BACK
LOCATION SIMPLE: RIGHT HAND
LOCATION SIMPLE: CHEST
LOCATION SIMPLE: LEFT POSTERIOR THIGH
LOCATION SIMPLE: RIGHT ANTERIOR NECK
LOCATION SIMPLE: ABDOMEN
LOCATION SIMPLE: LEFT ANTERIOR NECK
LOCATION SIMPLE: RIGHT SHOULDER
LOCATION SIMPLE: RIGHT POSTERIOR THIGH
LOCATION SIMPLE: LOWER BACK
LOCATION SIMPLE: LEFT UPPER ARM
LOCATION SIMPLE: LEFT LOWER BACK
LOCATION SIMPLE: UPPER BACK
LOCATION SIMPLE: LEFT UPPER BACK
LOCATION SIMPLE: RIGHT CHEEK
LOCATION SIMPLE: LEFT HAND

## 2019-01-28 ASSESSMENT — LOCATION DETAILED DESCRIPTION DERM
LOCATION DETAILED: LEFT RADIAL DORSAL HAND
LOCATION DETAILED: RIGHT PROXIMAL POSTERIOR THIGH
LOCATION DETAILED: SUPERIOR LUMBAR SPINE
LOCATION DETAILED: RIGHT INFERIOR MEDIAL UPPER BACK
LOCATION DETAILED: RIGHT RADIAL DORSAL HAND
LOCATION DETAILED: SUPERIOR THORACIC SPINE
LOCATION DETAILED: RIGHT POSTERIOR SHOULDER
LOCATION DETAILED: RIGHT INFERIOR ANTERIOR NECK
LOCATION DETAILED: LEFT SUPERIOR UPPER BACK
LOCATION DETAILED: RIGHT SUPERIOR MEDIAL MIDBACK
LOCATION DETAILED: LEFT PROXIMAL POSTERIOR THIGH
LOCATION DETAILED: RIGHT MEDIAL INFERIOR CHEST
LOCATION DETAILED: LEFT INFERIOR LATERAL MIDBACK
LOCATION DETAILED: LEFT ANTERIOR DISTAL UPPER ARM
LOCATION DETAILED: LEFT CLAVICULAR NECK
LOCATION DETAILED: RIGHT LATERAL UPPER BACK
LOCATION DETAILED: RIGHT RIB CAGE
LOCATION DETAILED: RIGHT INFERIOR CENTRAL MALAR CHEEK
LOCATION DETAILED: RIGHT SUPERIOR UPPER BACK

## 2019-01-28 ASSESSMENT — LOCATION ZONE DERM
LOCATION ZONE: LEG
LOCATION ZONE: TRUNK
LOCATION ZONE: NECK
LOCATION ZONE: ARM
LOCATION ZONE: ARM
LOCATION ZONE: HAND
LOCATION ZONE: FACE

## 2019-01-28 NOTE — PROCEDURE: LIQUID NITROGEN
Medical Necessity Information: It is in your best interest to select a reason for this procedure from the list below. All of these items fulfill various CMS LCD requirements except the new and changing color options.
Render Post-Care Instructions In Note?: no
Post-Care Instructions: I reviewed with the patient in detail post-care instructions. Patient is to wear sunprotection, and avoid picking at any of the treated lesions. Pt may apply Vaseline to crusted or scabbing areas.
Detail Level: Detailed
Consent: The patient's consent was obtained including but not limited to risks of crusting, scabbing, blistering, scarring, darker or lighter pigmentary change, recurrence, incomplete removal and infection.
Medical Necessity Clause: This procedure was medically necessary because the lesions that were treated were:
Duration Of Freeze Thaw-Cycle (Seconds): 0

## 2019-01-30 ENCOUNTER — HOSPITAL ENCOUNTER (OUTPATIENT)
Dept: LAB | Facility: MEDICAL CENTER | Age: 69
End: 2019-01-30
Attending: FAMILY MEDICINE
Payer: MEDICARE

## 2019-01-30 LAB
ALBUMIN SERPL BCP-MCNC: 4 G/DL (ref 3.2–4.9)
ALBUMIN/GLOB SERPL: 1.5 G/DL
ALP SERPL-CCNC: 93 U/L (ref 30–99)
ALT SERPL-CCNC: 17 U/L (ref 2–50)
ANION GAP SERPL CALC-SCNC: 7 MMOL/L (ref 0–11.9)
AST SERPL-CCNC: 20 U/L (ref 12–45)
BILIRUB SERPL-MCNC: 0.8 MG/DL (ref 0.1–1.5)
BUN SERPL-MCNC: 15 MG/DL (ref 8–22)
CALCIUM SERPL-MCNC: 9.6 MG/DL (ref 8.5–10.5)
CHLORIDE SERPL-SCNC: 107 MMOL/L (ref 96–112)
CHOLEST SERPL-MCNC: 135 MG/DL (ref 100–199)
CO2 SERPL-SCNC: 24 MMOL/L (ref 20–33)
CREAT SERPL-MCNC: 0.95 MG/DL (ref 0.5–1.4)
CREAT UR-MCNC: 103.4 MG/DL
EST. AVERAGE GLUCOSE BLD GHB EST-MCNC: 123 MG/DL
FASTING STATUS PATIENT QL REPORTED: NORMAL
GLOBULIN SER CALC-MCNC: 2.6 G/DL (ref 1.9–3.5)
GLUCOSE SERPL-MCNC: 90 MG/DL (ref 65–99)
HBA1C MFR BLD: 5.9 % (ref 0–5.6)
HDLC SERPL-MCNC: 57 MG/DL
LDLC SERPL CALC-MCNC: 65 MG/DL
MICROALBUMIN UR-MCNC: <0.7 MG/DL
MICROALBUMIN/CREAT UR: NORMAL MG/G (ref 0–30)
POTASSIUM SERPL-SCNC: 4 MMOL/L (ref 3.6–5.5)
PROT SERPL-MCNC: 6.6 G/DL (ref 6–8.2)
SODIUM SERPL-SCNC: 138 MMOL/L (ref 135–145)
TRIGL SERPL-MCNC: 64 MG/DL (ref 0–149)

## 2019-01-30 PROCEDURE — 83036 HEMOGLOBIN GLYCOSYLATED A1C: CPT

## 2019-01-30 PROCEDURE — 80053 COMPREHEN METABOLIC PANEL: CPT

## 2019-01-30 PROCEDURE — 36415 COLL VENOUS BLD VENIPUNCTURE: CPT

## 2019-01-30 PROCEDURE — 80061 LIPID PANEL: CPT

## 2019-01-30 PROCEDURE — 82043 UR ALBUMIN QUANTITATIVE: CPT

## 2019-01-30 PROCEDURE — 82570 ASSAY OF URINE CREATININE: CPT

## 2019-03-11 ENCOUNTER — HOSPITAL ENCOUNTER (OUTPATIENT)
Dept: RADIOLOGY | Facility: MEDICAL CENTER | Age: 69
End: 2019-03-11
Attending: NURSE PRACTITIONER
Payer: MEDICARE

## 2019-03-11 DIAGNOSIS — M51.36 DEGENERATION OF LUMBAR INTERVERTEBRAL DISC: ICD-10-CM

## 2019-03-11 DIAGNOSIS — M51.36 OTHER INTERVERTEBRAL DISC DEGENERATION, LUMBAR REGION: ICD-10-CM

## 2019-03-11 PROCEDURE — 72148 MRI LUMBAR SPINE W/O DYE: CPT

## 2019-03-11 PROCEDURE — 72110 X-RAY EXAM L-2 SPINE 4/>VWS: CPT

## 2019-03-15 ENCOUNTER — OFFICE VISIT (OUTPATIENT)
Dept: CARDIOLOGY | Facility: MEDICAL CENTER | Age: 69
End: 2019-03-15
Payer: MEDICARE

## 2019-03-15 VITALS
HEIGHT: 68 IN | HEART RATE: 72 BPM | OXYGEN SATURATION: 98 % | WEIGHT: 179 LBS | SYSTOLIC BLOOD PRESSURE: 122 MMHG | BODY MASS INDEX: 27.13 KG/M2 | DIASTOLIC BLOOD PRESSURE: 78 MMHG

## 2019-03-15 DIAGNOSIS — E78.5 DYSLIPIDEMIA: ICD-10-CM

## 2019-03-15 DIAGNOSIS — I10 ESSENTIAL HYPERTENSION, BENIGN: ICD-10-CM

## 2019-03-15 DIAGNOSIS — R93.1 AGATSTON CAC SCORE, >400: Primary | ICD-10-CM

## 2019-03-15 DIAGNOSIS — R73.03 PREDIABETES: ICD-10-CM

## 2019-03-15 PROCEDURE — 99213 OFFICE O/P EST LOW 20 MIN: CPT | Performed by: INTERNAL MEDICINE

## 2019-03-15 RX ORDER — IPRATROPIUM BROMIDE 42 UG/1
SPRAY, METERED NASAL
COMMUNITY
Start: 2019-01-22 | End: 2019-03-15

## 2019-03-15 ASSESSMENT — ENCOUNTER SYMPTOMS
HEADACHES: 1
CARDIOVASCULAR NEGATIVE: 1

## 2019-03-15 NOTE — LETTER
Name:          Nelson Jiménez   YOB: 1950  Date:     03/15/2019      Tennille Sampson M.D.  [address]  [place] [address]     Mukul Hernandez MD  [address]  [place] [address]  Phone: [phone]  Back Line: [phone]  Fax: [phone]  E-mail: [email or web address]   Dear Dr. Sampson,    We had the pleasure of seeing your patient, Nelson Jiménez, in Cardiology Clinic at [facility] today.    As you know, he is a 68-year-old man who I follow for hypertension, and dyslipidemia with moderate coronary artery calcification on CT chest (total score 519 by CT 3/2018).    From the cardiovascular perspective he is doing quite well with excellent control of his blood pressure and cholesterol on his current medications, which I think is absolutely necessary in the setting of his calcified coronary arteries.  Certainly, I do think his statin may be contributing to his 2 diabetes level blood sugars.  However, I do not recommend cutting back on his statin, and I rather favor the use of metformin 500 mg p.o. daily to prevent progression to kunal diabetes.  However, I will defer treatment of that to the primary care setting.    I did not have a chance to discuss with him smoking cessation at this time, though of course that is critically important to his ongoing vascular health.    Return in about 6 months (around 9/15/2019).    Thank you for the referral and please do not hesitate to contact me at any time. My contact information is listed above.    This note was dictated using Dragon speech recognition software.     A full note including my physical examination and a full list of rectified medications is available in our medical record, and can be faxed as well.    Mukul Hernandez MD  Cardiologist  [facility]

## 2019-03-15 NOTE — PROGRESS NOTES
Subjective:   Nelson Jiménez is a 68 -year-old man who I follow for hypertension, and dyslipidemia with moderate coronary artery calcification on CT chest (total score 519 by CT 3/2018).    He is doing well today, and has no cardiovascular complaints.  He comes in for expedited follow-up after news of my upcoming move to [place].    He does tell me about injuring his right knee a couple of weeks ago with some edema.  It was a twisting injury, and he has been seen by urgent care and [place], but not by orthopedic surgery.    We also had made some discussion about his prediabetes level blood sugars.    Past Medical History:   Diagnosis Date   • Anxiety    • Arthritis     loulou spine   • Backpain    • Degenerative joint disease of spine    • Essential hypertension, benign 12/21/2015   • Indigestion     GERD   • Psychiatric disorder     depression     Past Surgical History:   Procedure Laterality Date   • CYSTOSCOPY STENT PLACEMENT N/A 11/18/2018    Procedure: CYSTOSCOPY STENT PLACEMENT;  Surgeon: Jai Law M.D.;  Location: SURGERY [facility];  Service: Urology   • URETEROSCOPY Right 11/18/2018    Procedure: URETEROSCOPY;  Surgeon: Jai Law M.D.;  Location: SURGERY [facility];  Service: Urology   • LITHOTRIPSY N/A 11/18/2018    Procedure: LITHOTRIPSY;  Surgeon: Jai Law M.D.;  Location: SURGERY [facility];  Service: Urology   • OTHER ORTHOPEDIC SURGERY      L little finger fx repair     Family History   Problem Relation Age of Onset   • Hypertension Mother    • Hyperlipidemia Mother    • Other Father         psorosis of the liver     History   Smoking Status   • Current Some Day Smoker   • Types: Cigars   Smokeless Tobacco   • Never Used     Comment: 1-2 cigars per month     Allergies   Allergen Reactions   • Penicillins      Outpatient Encounter Prescriptions as of 3/15/2019   Medication Sig Dispense Refill   • atorvastatin (LIPITOR) 40 MG Tab Take 1 Tab by mouth every  "bedtime. 90 Tab 2   • aspirin EC (ECOTRIN) 81 MG Tablet Delayed Response Take 81 mg by mouth every day.     • Green Tea, Camillia sinensis, (GREEN TEA PO) Take 2 Tabs by mouth every morning.     • Omega-3 Fatty Acids (FISH OIL) 1000 MG Cap capsule Take 1,000 mg by mouth 2 Times a Day.     • Multiple Vitamins-Minerals (CENTRUM SILVER 50+MEN PO) Take 1 Tab by mouth every morning.     • diphenhydrAMINE (BENADRYL) 25 MG Tab Take 25 mg by mouth every 6 hours as needed for Sleep.     • lisinopril (PRINIVIL) 10 MG Tab Take 1 Tab by mouth every day. 90 Tab 3   • zolpidem (AMBIEN) 10 MG Tab Take 10 mg by mouth at bedtime as needed for Sleep.     • FLUoxetine (PROZAC) 10 MG Cap Take 40 mg by mouth every day. Take 4  Capsules every morning      • omeprazole (PRILOSEC) 20 MG CPDR Take 20 mg by mouth every day.     • [DISCONTINUED] ipratropium (ATROVENT) 0.06 % Solution      • [DISCONTINUED] tamsulosin (FLOMAX) 0.4 MG capsule Take 1 Cap by mouth every day. (Patient not taking: Reported on 3/15/2019) 30 Cap 0   • [DISCONTINUED] polyethylene glycol/lytes (MIRALAX) Pack Take 1 Packet by mouth every day. Please take to prevent constipation following your procedure.  If having loose stools this can be held. (Patient not taking: Reported on 3/15/2019) 30 Each 0   • [DISCONTINUED] loratadine (CLARITIN) 10 MG TABS Take 10 mg by mouth every day. For allergies        No facility-administered encounter medications on file as of 3/15/2019.      Review of Systems   Cardiovascular: Negative.         Right lower extremity swelling after injury to his knee   Musculoskeletal: Positive for joint pain (right knee).   Neurological: Positive for headaches.   All other systems reviewed and are negative.       Objective:   /78 (BP Location: Left arm, Patient Position: Sitting, BP Cuff Size: Adult)   Pulse 72   Ht 1.727 m (5' 8\")   Wt 81.2 kg (179 lb)   SpO2 98%   BMI 27.22 kg/m²     Physical Exam   Constitutional: He is oriented to person, " place, and time. He appears well-developed and well-nourished. No distress.   Very pleasant middle-aged man in no distress.  Physical examination is unchanged except where specified compared to my previous on 6/20/2018.   HENT:   Head: Normocephalic and atraumatic.   Eyes: Pupils are equal, round, and reactive to light. Conjunctivae and EOM are normal. No scleral icterus.   Neck: Neck supple. No JVD present. No tracheal deviation present.   Cardiovascular: Normal rate, regular rhythm, normal heart sounds and intact distal pulses.  Exam reveals no gallop and no friction rub.    No murmur heard.  No carotid bruits   Pulmonary/Chest: Effort normal and breath sounds normal. No stridor. No respiratory distress. He has no wheezes. He has no rales.   Abdominal: Soft. Bowel sounds are normal. He exhibits no distension.   Musculoskeletal: He exhibits edema (1+ right lower extremity edema with a joint effusion around his knee).   Neurological: He is alert and oriented to person, place, and time.   Skin: Skin is warm and dry. No rash noted. He is not diaphoretic. No erythema. No pallor.   Psychiatric: He has a normal mood and affect. Judgment and thought content normal.   Vitals reviewed.    Lab Results   Component Value Date/Time    WBC 8.0 11/18/2018 04:26 AM    RBC 4.53 (L) 11/18/2018 04:26 AM    HEMOGLOBIN 13.2 (L) 11/18/2018 04:26 AM    HEMATOCRIT 37.7 (L) 11/18/2018 04:26 AM    MCV 83.2 11/18/2018 04:26 AM    MCH 29.1 11/18/2018 04:26 AM    MCHC 35.0 11/18/2018 04:26 AM    MPV 9.1 11/18/2018 04:26 AM        Lab Results   Component Value Date/Time    SODIUM 138 01/30/2019 07:33 AM    POTASSIUM 4.0 01/30/2019 07:33 AM    CHLORIDE 107 01/30/2019 07:33 AM    CO2 24 01/30/2019 07:33 AM    GLUCOSE 90 01/30/2019 07:33 AM    BUN 15 01/30/2019 07:33 AM    CREATININE 0.95 01/30/2019 07:33 AM        Lab Results   Component Value Date/Time    ASTSGOT 20 01/30/2019 07:33 AM    ALTSGPT 17 01/30/2019 07:33 AM        Lab Results  "  Component Value Date/Time    CHOLSTRLTOT 135 01/30/2019 07:33 AM    LDL 65 01/30/2019 07:33 AM    HDL 57 01/30/2019 07:33 AM    TRIGLYCERIDE 64 01/30/2019 07:33 AM         Results for orders placed or performed in visit on 11/16/15   [facility] EKG (Clinic Performed)   Result Value Ref Range    Report SINUS RHYTHM [Remains]     Report NO SIGNIFICANT CHANGE      Labs, 4/7/2017  CBC: WBC 4.0, hemoglobin 14.7, platelets 393  Chemstrip panel: Sodium 139, potassium 4.3, chloride 105, CO2 27, BUN 17, creatinine 1.1, glucose 97  LFTs: AST 20, ALT 14, alkaline phosphatase 105, albumin 4.2, total protein 7.6, total bili  melissa 0.6  Lipids: Calculated , HDL 60, triglycerides 103, total cholesterol 190    [facility] labs, 2/22/2018  Chemistry panel: Sodium 140, potassium 3.8, chloride not reported, CO2 also not reported, BUN 15, creatinine 1.1, glucose 95, calcium 9.0  LFTs: AST 19, ALT 14, alkaline phosphatase 104, albumin 4.3, total protein 7.6  Lipids: , HDL 61, triglycerides 89, total cholesterol 191  CBC: WBC 4.8, hemoglobin 14.7, platelets 397    CT coronary calcium scan, 3/26/2018:  \"Coronary calcification:  LMA - 18.8  LCX - 7.4  LAD - 190.2  RCA - 302.6  PDA - 0.0  Calcium score:  519\"    Assessment:     1. Agatston CAC score, >400     2. Essential hypertension, benign     3. Dyslipidemia     4. Prediabetes         Medical Decision Making:  Today's Assessment / Status / Plan:     From the cardiovascular perspective he is doing quite well with excellent control of his blood pressure and cholesterol on his current medications, which I think is absolutely necessary in the setting of his calcified coronary arteries.  Certainly, I do think his statin may be contributing to his 2 diabetes level blood sugars.  However, I do not recommend cutting back on his statin, and I rather favor the use of metformin 500 mg p.o. daily to prevent progression to kunal diabetes.  However, I will defer treatment of that to the " primary care setting.    I did not have a chance to discuss with him smoking cessation at this time, though of course that is critically important to his ongoing vascular health.    Mukul Hernandez MD  Cardiologist, [facility] [place]     Return in about 6 months (around 9/15/2019).    Physical Exam   Constitutional: He is oriented to person, place, and time. He appears well-developed and well-nourished. No distress.   Very pleasant middle-aged man in no distress.  Physical examination is unchanged except where specified compared to my previous on 6/20/2018.   HENT:   Head: Normocephalic and atraumatic.   Eyes: Pupils are equal, round, and reactive to light. Conjunctivae and EOM are normal. No scleral icterus.   Neck: Neck supple. No JVD present. No tracheal deviation present.   Cardiovascular: Normal rate, regular rhythm, normal heart sounds and intact distal pulses.  Exam reveals no gallop and no friction rub.    No murmur heard.  No carotid bruits   Pulmonary/Chest: Effort normal and breath sounds normal. No stridor. No respiratory distress. He has no wheezes. He has no rales.   Abdominal: Soft. Bowel sounds are normal. He exhibits no distension.   Musculoskeletal: He exhibits edema (1+ right lower extremity edema with a joint effusion around his knee).   Neurological: He is alert and oriented to person, place, and time.   Skin: Skin is warm and dry. No rash noted. He is not diaphoretic. No erythema. No pallor.   Psychiatric: He has a normal mood and affect. Judgment and thought content normal.   Vitals reviewed.

## 2019-05-03 ENCOUNTER — HOSPITAL ENCOUNTER (OUTPATIENT)
Dept: LAB | Facility: MEDICAL CENTER | Age: 69
End: 2019-05-03
Attending: FAMILY MEDICINE
Payer: MEDICARE

## 2019-05-03 LAB
ALBUMIN SERPL BCP-MCNC: 3.9 G/DL (ref 3.2–4.9)
ALBUMIN/GLOB SERPL: 1.6 G/DL
ALP SERPL-CCNC: 90 U/L (ref 30–99)
ALT SERPL-CCNC: 17 U/L (ref 2–50)
ANION GAP SERPL CALC-SCNC: 7 MMOL/L (ref 0–11.9)
AST SERPL-CCNC: 21 U/L (ref 12–45)
BILIRUB SERPL-MCNC: 0.6 MG/DL (ref 0.1–1.5)
BUN SERPL-MCNC: 16 MG/DL (ref 8–22)
CALCIUM SERPL-MCNC: 8.9 MG/DL (ref 8.5–10.5)
CHLORIDE SERPL-SCNC: 106 MMOL/L (ref 96–112)
CHOLEST SERPL-MCNC: 150 MG/DL (ref 100–199)
CO2 SERPL-SCNC: 26 MMOL/L (ref 20–33)
CREAT SERPL-MCNC: 0.96 MG/DL (ref 0.5–1.4)
CREAT UR-MCNC: 93 MG/DL
EST. AVERAGE GLUCOSE BLD GHB EST-MCNC: 128 MG/DL
FASTING STATUS PATIENT QL REPORTED: NORMAL
GLOBULIN SER CALC-MCNC: 2.5 G/DL (ref 1.9–3.5)
GLUCOSE SERPL-MCNC: 89 MG/DL (ref 65–99)
HBA1C MFR BLD: 6.1 % (ref 0–5.6)
HDLC SERPL-MCNC: 63 MG/DL
LDLC SERPL CALC-MCNC: 72 MG/DL
MICROALBUMIN UR-MCNC: <0.7 MG/DL
MICROALBUMIN/CREAT UR: NORMAL MG/G (ref 0–30)
POTASSIUM SERPL-SCNC: 3.9 MMOL/L (ref 3.6–5.5)
PROT SERPL-MCNC: 6.4 G/DL (ref 6–8.2)
SODIUM SERPL-SCNC: 139 MMOL/L (ref 135–145)
TRIGL SERPL-MCNC: 75 MG/DL (ref 0–149)

## 2019-05-03 PROCEDURE — 80061 LIPID PANEL: CPT

## 2019-05-03 PROCEDURE — 82570 ASSAY OF URINE CREATININE: CPT

## 2019-05-03 PROCEDURE — 82043 UR ALBUMIN QUANTITATIVE: CPT

## 2019-05-03 PROCEDURE — 83036 HEMOGLOBIN GLYCOSYLATED A1C: CPT | Mod: GA

## 2019-05-03 PROCEDURE — 36415 COLL VENOUS BLD VENIPUNCTURE: CPT

## 2019-05-03 PROCEDURE — 80053 COMPREHEN METABOLIC PANEL: CPT

## 2019-08-14 ENCOUNTER — HOSPITAL ENCOUNTER (OUTPATIENT)
Dept: LAB | Facility: MEDICAL CENTER | Age: 69
End: 2019-08-14
Attending: FAMILY MEDICINE
Payer: MEDICARE

## 2019-08-14 LAB
EST. AVERAGE GLUCOSE BLD GHB EST-MCNC: 128 MG/DL
HBA1C MFR BLD: 6.1 % (ref 0–5.6)

## 2019-08-14 PROCEDURE — 80061 LIPID PANEL: CPT

## 2019-08-14 PROCEDURE — 36415 COLL VENOUS BLD VENIPUNCTURE: CPT

## 2019-08-14 PROCEDURE — 80053 COMPREHEN METABOLIC PANEL: CPT

## 2019-08-14 PROCEDURE — 83036 HEMOGLOBIN GLYCOSYLATED A1C: CPT | Mod: GA

## 2019-08-15 LAB
ALBUMIN SERPL BCP-MCNC: 4.1 G/DL (ref 3.2–4.9)
ALBUMIN/GLOB SERPL: 1.3 G/DL
ALP SERPL-CCNC: 97 U/L (ref 30–99)
ALT SERPL-CCNC: 18 U/L (ref 2–50)
ANION GAP SERPL CALC-SCNC: 9 MMOL/L (ref 0–11.9)
AST SERPL-CCNC: 17 U/L (ref 12–45)
BILIRUB SERPL-MCNC: 0.8 MG/DL (ref 0.1–1.5)
BUN SERPL-MCNC: 16 MG/DL (ref 8–22)
CALCIUM SERPL-MCNC: 9.6 MG/DL (ref 8.5–10.5)
CHLORIDE SERPL-SCNC: 106 MMOL/L (ref 96–112)
CHOLEST SERPL-MCNC: 145 MG/DL (ref 100–199)
CO2 SERPL-SCNC: 25 MMOL/L (ref 20–33)
CREAT SERPL-MCNC: 0.94 MG/DL (ref 0.5–1.4)
FASTING STATUS PATIENT QL REPORTED: NORMAL
GLOBULIN SER CALC-MCNC: 3.2 G/DL (ref 1.9–3.5)
GLUCOSE SERPL-MCNC: 96 MG/DL (ref 65–99)
HDLC SERPL-MCNC: 59 MG/DL
LDLC SERPL CALC-MCNC: 70 MG/DL
POTASSIUM SERPL-SCNC: 4.3 MMOL/L (ref 3.6–5.5)
PROT SERPL-MCNC: 7.3 G/DL (ref 6–8.2)
SODIUM SERPL-SCNC: 140 MMOL/L (ref 135–145)
TRIGL SERPL-MCNC: 79 MG/DL (ref 0–149)

## 2019-09-06 ENCOUNTER — OFFICE VISIT (OUTPATIENT)
Dept: CARDIOLOGY | Facility: MEDICAL CENTER | Age: 69
End: 2019-09-06
Payer: MEDICARE

## 2019-09-06 VITALS
BODY MASS INDEX: 25.76 KG/M2 | OXYGEN SATURATION: 98 % | HEIGHT: 68 IN | SYSTOLIC BLOOD PRESSURE: 108 MMHG | HEART RATE: 64 BPM | WEIGHT: 170 LBS | DIASTOLIC BLOOD PRESSURE: 54 MMHG

## 2019-09-06 DIAGNOSIS — E78.2 MIXED HYPERLIPIDEMIA: ICD-10-CM

## 2019-09-06 DIAGNOSIS — E78.5 DYSLIPIDEMIA: ICD-10-CM

## 2019-09-06 DIAGNOSIS — R93.1 AGATSTON CAC SCORE, >400: ICD-10-CM

## 2019-09-06 DIAGNOSIS — I10 ESSENTIAL HYPERTENSION, BENIGN: ICD-10-CM

## 2019-09-06 PROCEDURE — 99214 OFFICE O/P EST MOD 30 MIN: CPT | Performed by: INTERNAL MEDICINE

## 2019-09-06 RX ORDER — ATORVASTATIN CALCIUM 40 MG/1
40 TABLET, FILM COATED ORAL
Qty: 90 TAB | Refills: 3 | Status: SHIPPED | OUTPATIENT
Start: 2019-09-06 | End: 2019-09-06 | Stop reason: SDUPTHER

## 2019-09-06 RX ORDER — ATORVASTATIN CALCIUM 40 MG/1
40 TABLET, FILM COATED ORAL
Qty: 90 TAB | Refills: 3 | Status: SHIPPED | OUTPATIENT
Start: 2019-09-06 | End: 2022-04-26 | Stop reason: SDUPTHER

## 2019-09-06 RX ORDER — LISINOPRIL 10 MG/1
10 TABLET ORAL DAILY
Qty: 90 TAB | Refills: 3 | Status: SHIPPED | OUTPATIENT
Start: 2019-09-06 | End: 2019-09-06 | Stop reason: SDUPTHER

## 2019-09-06 RX ORDER — LISINOPRIL 10 MG/1
10 TABLET ORAL DAILY
Qty: 90 TAB | Refills: 3 | Status: SHIPPED | OUTPATIENT
Start: 2019-09-06 | End: 2022-04-26 | Stop reason: SDUPTHER

## 2019-09-06 ASSESSMENT — ENCOUNTER SYMPTOMS
FALLS: 0
FEVER: 0
CHILLS: 0
DIZZINESS: 0
ABDOMINAL PAIN: 0
SHORTNESS OF BREATH: 0
NAUSEA: 0
SORE THROAT: 0
PND: 0
COUGH: 0
BLURRED VISION: 0
BRUISES/BLEEDS EASILY: 0
CLAUDICATION: 0
PALPITATIONS: 0
WEAKNESS: 0
FOCAL WEAKNESS: 0

## 2019-09-06 NOTE — PATIENT INSTRUCTIONS
Please look into the following diets and incorporate them into your diet    LOW SALT DIET   KEEP YOUR SODIUM EQUAL TO CALORIES AND NO MORE THAN DOUBLE THE CALORIES FOR A LOW SALT DIET    FOR TREATMENT OR PREVENTION OF CORONARY ARTERY DISEASE    [place] - [facility]    Dr. Ching[facility] Program for Reversing Heart Disease - [facility] Cardiologist    [facility]    Dr Hawkins - [place] [facility] [place] (book and documentary)      FOR TREATMENT OF BLOOD PRESSURE  DASH DIET - American Heart Association for treatment of HYPERTENSION    FOR TREATMENT OF BAD CHOLESTEROL/FATS  REDUCE PROCESSED SUGAR AS MUCH AS POSSIBLE  INCREASE WHOLE GRAINS/VEGETABLES    Lowering total cholesterol and LDL (bad) cholesterol:  - Eat leaner cuts of meat, or eliminate altogether if possible red meat, and frequently substitute fish or chicken.  - Limit saturated fat to no more than 7-10% of total calories no more than 10 g per day is recommended. Some sources of saturated fat include butter, animal fats, hydrogenated vegetable fats and oils, many desserts, whole milk dairy products.  - Replaced saturated fats with polyunsaturated fats and monounsaturated fats. Foods high in monounsaturated fat include nuts, although well, canola oil, avocados, and olives.  - Limit trans fat (processed foods) and replaced with fresh fruits and vegetables  - Recommend nonfat dairy products  - Increase substantially the amount of soluble fiber intake (legumes such as beans, fruit, whole grains).  - Consider nutritional supplements: plant sterile spreads such as Benecol, fish oil,  flaxseed oil, omega-3 acids capsules 1000 mg twice a day, or viscous fiber such as Metamucil  - Attain ideal weight and regular exercise (at least 30 minutes per day of walking)    Lowering triglycerides:  - Reduce intake of simple sugar: Desserts, candy, pastries, honey, sodas, sugared cereals, yogurt, Gatorade, sports bars, canned  fruit, smoothies, fruit juice, coffee drinks  - Reduced intake of refined starches: Refined Pasta  - Reduce or abstain from alcohol  - Increase omega-3 fatty acids: [place], Trout, Mackerel, Herring, Albac[place] tuna and supplements  - Attain ideal weight and regular exercise (at least 30 minutes per day of walking)      Elevating HDL (good) cholesterol:  - Increase physical activity  - Seasoned foods with garlic and onions  - Increase omega-3 fatty acids and supplements as listed above  - Incorporating appropriate amounts of monounsaturated fats such as nuts, olive oil, canola oil, avocados, olives  - Stop smoking  - Attain ideal weight and regular exercise (at least 30 minutes per day of walking)

## 2019-09-06 NOTE — LETTER
[facility]   [address]   [address]  [place] [address]  Phone: [phone]  Fax: [phone]              Nelson Jiménez  1950    Encounter Date: 9/6/2019    Malik Zabala M.D.          PROGRESS NOTE:  Chief Complaint   Patient presents with   • Coronary Artery Disease       Subjective:   Nelson Jiménez is a 68 y.o. male who presents today for follow-up of his history of hypertension dyslipidemia and coronary calcium score elevation    He is done well on his current medications including a heart healthy lifestyle    Past Medical History:   Diagnosis Date   • Anxiety    • Arthritis     loulou spine   • Backpain    • Degenerative joint disease of spine    • Essential hypertension, benign 12/21/2015   • Indigestion     GERD   • Psychiatric disorder     depression     Past Surgical History:   Procedure Laterality Date   • CYSTOSCOPY STENT PLACEMENT N/A 11/18/2018    Procedure: CYSTOSCOPY STENT PLACEMENT;  Surgeon: Jai Law M.D.;  Location: [facility];  Service: Urology   • URETEROSCOPY Right 11/18/2018    Procedure: URETEROSCOPY;  Surgeon: Jai Law M.D.;  Location: [facility];  Service: Urology   • LITHOTRIPSY N/A 11/18/2018    Procedure: LITHOTRIPSY;  Surgeon: Jai Law M.D.;  Location: [facility];  Service: Urology   • OTHER ORTHOPEDIC SURGERY      L little finger fx repair     Family History   Problem Relation Age of Onset   • Hypertension Mother    • Hyperlipidemia Mother    • Other Father         psorosis of the liver     Social History     Socioeconomic History   • Marital status:      Spouse name: Not on file   • Number of children: Not on file   • Years of education: Not on file   • Highest education level: Not on file   Occupational History   • Not on file   Social Needs   • Financial resource strain: Not on file   • Food insecurity:     Worry: Not on file        Inability: Not on file   • Transportation needs:     Medical: Not on file     Non-medical: Not on file   Tobacco Use   • Smoking status: Current Some Day Smoker     Types: Cigars   • Smokeless tobacco: Never Used   • Tobacco comment: 1-2 cigars per month   Substance and Sexual Activity   • Alcohol use: Yes     Alcohol/week: 7.2 oz     Types: 12 Cans of beer per week     Comment: 1-2 beers per week   • Drug use: No   • Sexual activity: Yes   Lifestyle   • Physical activity:     Days per week: Not on file     Minutes per session: Not on file   • Stress: Not on file   Relationships   • Social connections:     Talks on phone: Not on file     Gets together: Not on file     Attends [employer] service: Not on file     Active member of club or organization: Not on file     Attends meetings of clubs or organizations: Not on file     Relationship status: Not on file   • Intimate partner violence:     Fear of current or ex partner: Not on file     Emotionally abused: Not on file     Physically abused: Not on file     Forced sexual activity: Not on file   Other Topics Concern   • Not on file   Social History Narrative   • Not on file     Allergies   Allergen Reactions   • Penicillins      Outpatient Encounter Medications as of 9/6/2019   Medication Sig Dispense Refill   • atorvastatin (LIPITOR) 40 MG Tab Take 1 Tab by mouth every bedtime. 90 Tab 3   • lisinopril (PRINIVIL) 10 MG Tab Take 1 Tab by mouth every day. 90 Tab 3   • Green Tea, Camillia sinensis, (GREEN TEA PO) Take 2 Tabs by mouth every morning.     • Omega-3 Fatty Acids (FISH OIL) 1000 MG Cap capsule Take 1,000 mg by mouth 2 Times a Day.     • Multiple Vitamins-Minerals (CENTRUM SILVER 50+MEN PO) Take 1 Tab by mouth every morning.     • zolpidem (AMBIEN) 10 MG Tab Take 10 mg by mouth at bedtime as needed for Sleep.     • FLUoxetine (PROZAC) 10 MG Cap Take 40 mg by mouth every day. Take 4  Capsules every morning      • omeprazole (PRILOSEC) 20 MG CPDR Take 20 mg by  "mouth every day.     • [DISCONTINUED] atorvastatin (LIPITOR) 40 MG Tab Take 1 Tab by mouth every bedtime. 90 Tab 3   • [DISCONTINUED] lisinopril (PRINIVIL) 10 MG Tab Take 1 Tab by mouth every day. 90 Tab 3   • [DISCONTINUED] atorvastatin (LIPITOR) 40 MG Tab Take 1 Tab by mouth every bedtime. 90 Tab 2   • aspirin EC (ECOTRIN) 81 MG Tablet Delayed Response Take 81 mg by mouth every day.     • diphenhydrAMINE (BENADRYL) 25 MG Tab Take 25 mg by mouth every 6 hours as needed for Sleep.     • [DISCONTINUED] lisinopril (PRINIVIL) 10 MG Tab Take 1 Tab by mouth every day. 90 Tab 3     No facility-administered encounter medications on file as of 9/6/2019.      Review of Systems   Constitutional: Negative for chills and fever.   HENT: Negative for sore throat.    Eyes: Negative for blurred vision.   Respiratory: Negative for cough and shortness of breath.    Cardiovascular: Negative for chest pain, palpitations, claudication, leg swelling and PND.   Gastrointestinal: Negative for abdominal pain and nausea.   Musculoskeletal: Negative for falls and joint pain.   Skin: Negative for rash.   Neurological: Negative for dizziness, focal weakness and weakness.   Endo/Heme/Allergies: Does not bruise/bleed easily.        Objective:   /54 (BP Location: Left arm, Patient Position: Sitting, BP Cuff Size: Adult)   Pulse 64   Ht 1.727 m (5' 8\")   Wt 77.1 kg (170 lb)   SpO2 98%   BMI 25.85 kg/m²      Physical Exam   Constitutional: No distress.   HENT:   Mouth/Throat: Oropharynx is clear and moist. No oropharyngeal exudate.   Eyes: No scleral icterus.   Neck: No JVD present.   Cardiovascular: Normal rate and normal heart sounds. Exam reveals no gallop and no friction rub.   No murmur heard.  Pulmonary/Chest: No respiratory distress. He has no wheezes. He has no rales.   Abdominal: Soft. Bowel sounds are normal.   Musculoskeletal: He exhibits no edema.   Neurological: He is alert.   Skin: No rash noted. He is not diaphoretic.   "   Psychiatric: He has a normal mood and affect.     We reviewed in person the most recent labs  Recent Results (from the past 4032 hour(s))   ESTIMATED GFR    Collection Time: 05/03/19  6:19 AM   Result Value Ref Range    GFR If African American >60 >60 mL/min/1.73 m 2    GFR If Non African American >60 >60 mL/min/1.73 m 2   Comp Metabolic Panel    Collection Time: 05/03/19  6:19 AM   Result Value Ref Range    Sodium 139 135 - 145 mmol/L    Potassium 3.9 3.6 - 5.5 mmol/L    Chloride 106 96 - 112 mmol/L    Co2 26 20 - 33 mmol/L    Anion Gap 7.0 0.0 - 11.9    Glucose 89 65 - 99 mg/dL    Bun 16 8 - 22 mg/dL    Creatinine 0.96 0.50 - 1.40 mg/dL    Calcium 8.9 8.5 - 10.5 mg/dL    AST(SGOT) 21 12 - 45 U/L    ALT(SGPT) 17 2 - 50 U/L    Alkaline Phosphatase 90 30 - 99 U/L    Total Bilirubin 0.6 0.1 - 1.5 mg/dL    Albumin 3.9 3.2 - 4.9 g/dL    Total Protein 6.4 6.0 - 8.2 g/dL    Globulin 2.5 1.9 - 3.5 g/dL    A-G Ratio 1.6 g/dL   Lipid Profile    Collection Time: 05/03/19  6:19 AM   Result Value Ref Range    Cholesterol,Tot 150 100 - 199 mg/dL    Triglycerides 75 0 - 149 mg/dL    HDL 63 >=40 mg/dL    LDL 72 <100 mg/dL   FASTING STATUS    Collection Time: 05/03/19  6:19 AM   Result Value Ref Range    Fasting Status Fasting    HEMOGLOBIN A1C    Collection Time: 05/03/19  6:19 AM   Result Value Ref Range    Glycohemoglobin 6.1 (H) 0.0 - 5.6 %    Est Avg Glucose 128 mg/dL   MICROALBUMIN CREAT RATIO URINE    Collection Time: 05/03/19  6:19 AM   Result Value Ref Range    Creatinine, Urine 93.00 mg/dL    Microalbumin, Urine Random <0.7 mg/dL    Micro Alb Creat Ratio see below 0 - 30 mg/g   HEMOGLOBIN A1C    Collection Time: 08/14/19  7:35 AM   Result Value Ref Range    Glycohemoglobin 6.1 (H) 0.0 - 5.6 %    Est Avg Glucose 128 mg/dL   ESTIMATED GFR    Collection Time: 08/14/19  7:35 AM   Result Value Ref Range    GFR If African American >60 >60 mL/min/1.73 m 2    GFR If Non African American >60 >60 mL/min/1.73 m 2   Comp Metabolic  Panel    Collection Time: 08/14/19  7:35 AM   Result Value Ref Range    Sodium 140 135 - 145 mmol/L    Potassium 4.3 3.6 - 5.5 mmol/L    Chloride 106 96 - 112 mmol/L    Co2 25 20 - 33 mmol/L    Anion Gap 9.0 0.0 - 11.9    Glucose 96 65 - 99 mg/dL    Bun 16 8 - 22 mg/dL    Creatinine 0.94 0.50 - 1.40 mg/dL    Calcium 9.6 8.5 - 10.5 mg/dL    AST(SGOT) 17 12 - 45 U/L    ALT(SGPT) 18 2 - 50 U/L    Alkaline Phosphatase 97 30 - 99 U/L    Total Bilirubin 0.8 0.1 - 1.5 mg/dL    Albumin 4.1 3.2 - 4.9 g/dL    Total Protein 7.3 6.0 - 8.2 g/dL    Globulin 3.2 1.9 - 3.5 g/dL    A-G Ratio 1.3 g/dL   Lipid Profile    Collection Time: 08/14/19  7:35 AM   Result Value Ref Range    Cholesterol,Tot 145 100 - 199 mg/dL    Triglycerides 79 0 - 149 mg/dL    HDL 59 >=40 mg/dL    LDL 70 <100 mg/dL   FASTING STATUS    Collection Time: 08/14/19  7:35 AM   Result Value Ref Range    Fasting Status Fasting        Assessment:     1. Agatston CAC score, >400     2. Dyslipidemia     3. Essential hypertension, benign  lisinopril (PRINIVIL) 10 MG Tab    DISCONTINUED: lisinopril (PRINIVIL) 10 MG Tab   4. Mixed hyperlipidemia  atorvastatin (LIPITOR) 40 MG Tab    DISCONTINUED: atorvastatin (LIPITOR) 40 MG Tab       Medical Decision Making:  Today's Assessment / Status / Plan:     It was my pleasure to meet with Mr. Jiménez.    Blood pressure is well controlled.      He is on appropriate statin.    I will see Mr. Jiménez back in 1 year time and encouraged him to follow up with us over the phone or electronically using my MyChart as issues arise.    It is my pleasure to participate in the care of Mr. Jiménez.  Please do not hesitate to contact me with questions or concerns.    Malik Zabala MD PhD [facility]  Cardiologist [facility]    Please note that this dictation was created using voice recognition software. I have worked with consultants from the vendor as well as technical experts from [facility] to optimize the  interface. I have made every reasonable attempt to correct obvious errors, but I expect that there are errors of grammar and possibly content I did not discover before finalizing the note.         Tennille Sampson M.D.  [address] [address]  [place] [address]  VIA Facsimile: [phone]     Vanesa Clemente M.D.  [address]  [place] [address]  VIA Facsimile: [phone]

## 2019-09-07 NOTE — PROGRESS NOTES
Chief Complaint   Patient presents with   • Coronary Artery Disease       Subjective:   Nelson Jiménez is a 68 y.o. male who presents today for follow-up of his history of hypertension dyslipidemia and coronary calcium score elevation    He is done well on his current medications including a heart healthy lifestyle    Past Medical History:   Diagnosis Date   • Anxiety    • Arthritis     loulou spine   • Backpain    • Degenerative joint disease of spine    • Essential hypertension, benign 12/21/2015   • Indigestion     GERD   • Psychiatric disorder     depression     Past Surgical History:   Procedure Laterality Date   • CYSTOSCOPY STENT PLACEMENT N/A 11/18/2018    Procedure: CYSTOSCOPY STENT PLACEMENT;  Surgeon: Jai Law M.D.;  Location: [facility];  Service: Urology   • URETEROSCOPY Right 11/18/2018    Procedure: URETEROSCOPY;  Surgeon: Jai Law M.D.;  Location: [facility];  Service: Urology   • LITHOTRIPSY N/A 11/18/2018    Procedure: LITHOTRIPSY;  Surgeon: Jai Law M.D.;  Location: [facility];  Service: Urology   • OTHER ORTHOPEDIC SURGERY      L little finger fx repair     Family History   Problem Relation Age of Onset   • Hypertension Mother    • Hyperlipidemia Mother    • Other Father         psorosis of the liver     Social History     Socioeconomic History   • Marital status:      Spouse name: Not on file   • Number of children: Not on file   • Years of education: Not on file   • Highest education level: Not on file   Occupational History   • Not on file   Social Needs   • Financial resource strain: Not on file   • Food insecurity:     Worry: Not on file     Inability: Not on file   • Transportation needs:     Medical: Not on file     Non-medical: Not on file   Tobacco Use   • Smoking status: Current Some Day Smoker     Types: Cigars   • Smokeless tobacco: Never Used   • Tobacco comment: 1-2 cigars per month   Substance and Sexual  Activity   • Alcohol use: Yes     Alcohol/week: 7.2 oz     Types: 12 Cans of beer per week     Comment: 1-2 beers per week   • Drug use: No   • Sexual activity: Yes   Lifestyle   • Physical activity:     Days per week: Not on file     Minutes per session: Not on file   • Stress: Not on file   Relationships   • Social connections:     Talks on phone: Not on file     Gets together: Not on file     Attends [employer] service: Not on file     Active member of club or organization: Not on file     Attends meetings of clubs or organizations: Not on file     Relationship status: Not on file   • Intimate partner violence:     Fear of current or ex partner: Not on file     Emotionally abused: Not on file     Physically abused: Not on file     Forced sexual activity: Not on file   Other Topics Concern   • Not on file   Social History Narrative   • Not on file     Allergies   Allergen Reactions   • Penicillins      Outpatient Encounter Medications as of 9/6/2019   Medication Sig Dispense Refill   • atorvastatin (LIPITOR) 40 MG Tab Take 1 Tab by mouth every bedtime. 90 Tab 3   • lisinopril (PRINIVIL) 10 MG Tab Take 1 Tab by mouth every day. 90 Tab 3   • Green Tea, Camillia sinensis, (GREEN TEA PO) Take 2 Tabs by mouth every morning.     • Omega-3 Fatty Acids (FISH OIL) 1000 MG Cap capsule Take 1,000 mg by mouth 2 Times a Day.     • Multiple Vitamins-Minerals (CENTRUM SILVER 50+MEN PO) Take 1 Tab by mouth every morning.     • zolpidem (AMBIEN) 10 MG Tab Take 10 mg by mouth at bedtime as needed for Sleep.     • FLUoxetine (PROZAC) 10 MG Cap Take 40 mg by mouth every day. Take 4  Capsules every morning      • omeprazole (PRILOSEC) 20 MG CPDR Take 20 mg by mouth every day.     • [DISCONTINUED] atorvastatin (LIPITOR) 40 MG Tab Take 1 Tab by mouth every bedtime. 90 Tab 3   • [DISCONTINUED] lisinopril (PRINIVIL) 10 MG Tab Take 1 Tab by mouth every day. 90 Tab 3   • [DISCONTINUED] atorvastatin (LIPITOR) 40 MG Tab Take 1 Tab by mouth  "every bedtime. 90 Tab 2   • aspirin EC (ECOTRIN) 81 MG Tablet Delayed Response Take 81 mg by mouth every day.     • diphenhydrAMINE (BENADRYL) 25 MG Tab Take 25 mg by mouth every 6 hours as needed for Sleep.     • [DISCONTINUED] lisinopril (PRINIVIL) 10 MG Tab Take 1 Tab by mouth every day. 90 Tab 3     No facility-administered encounter medications on file as of 9/6/2019.      Review of Systems   Constitutional: Negative for chills and fever.   HENT: Negative for sore throat.    Eyes: Negative for blurred vision.   Respiratory: Negative for cough and shortness of breath.    Cardiovascular: Negative for chest pain, palpitations, claudication, leg swelling and PND.   Gastrointestinal: Negative for abdominal pain and nausea.   Musculoskeletal: Negative for falls and joint pain.   Skin: Negative for rash.   Neurological: Negative for dizziness, focal weakness and weakness.   Endo/Heme/Allergies: Does not bruise/bleed easily.        Objective:   /54 (BP Location: Left arm, Patient Position: Sitting, BP Cuff Size: Adult)   Pulse 64   Ht 1.727 m (5' 8\")   Wt 77.1 kg (170 lb)   SpO2 98%   BMI 25.85 kg/m²     Physical Exam   Constitutional: No distress.   HENT:   Mouth/Throat: Oropharynx is clear and moist. No oropharyngeal exudate.   Eyes: No scleral icterus.   Neck: No JVD present.   Cardiovascular: Normal rate and normal heart sounds. Exam reveals no gallop and no friction rub.   No murmur heard.  Pulmonary/Chest: No respiratory distress. He has no wheezes. He has no rales.   Abdominal: Soft. Bowel sounds are normal.   Musculoskeletal: He exhibits no edema.   Neurological: He is alert.   Skin: No rash noted. He is not diaphoretic.   Psychiatric: He has a normal mood and affect.     We reviewed in person the most recent labs  Recent Results (from the past 4032 hour(s))   ESTIMATED GFR    Collection Time: 05/03/19  6:19 AM   Result Value Ref Range    GFR If African American >60 >60 mL/min/1.73 m 2    GFR If Non " African American >60 >60 mL/min/1.73 m 2   Comp Metabolic Panel    Collection Time: 05/03/19  6:19 AM   Result Value Ref Range    Sodium 139 135 - 145 mmol/L    Potassium 3.9 3.6 - 5.5 mmol/L    Chloride 106 96 - 112 mmol/L    Co2 26 20 - 33 mmol/L    Anion Gap 7.0 0.0 - 11.9    Glucose 89 65 - 99 mg/dL    Bun 16 8 - 22 mg/dL    Creatinine 0.96 0.50 - 1.40 mg/dL    Calcium 8.9 8.5 - 10.5 mg/dL    AST(SGOT) 21 12 - 45 U/L    ALT(SGPT) 17 2 - 50 U/L    Alkaline Phosphatase 90 30 - 99 U/L    Total Bilirubin 0.6 0.1 - 1.5 mg/dL    Albumin 3.9 3.2 - 4.9 g/dL    Total Protein 6.4 6.0 - 8.2 g/dL    Globulin 2.5 1.9 - 3.5 g/dL    A-G Ratio 1.6 g/dL   Lipid Profile    Collection Time: 05/03/19  6:19 AM   Result Value Ref Range    Cholesterol,Tot 150 100 - 199 mg/dL    Triglycerides 75 0 - 149 mg/dL    HDL 63 >=40 mg/dL    LDL 72 <100 mg/dL   FASTING STATUS    Collection Time: 05/03/19  6:19 AM   Result Value Ref Range    Fasting Status Fasting    HEMOGLOBIN A1C    Collection Time: 05/03/19  6:19 AM   Result Value Ref Range    Glycohemoglobin 6.1 (H) 0.0 - 5.6 %    Est Avg Glucose 128 mg/dL   MICROALBUMIN CREAT RATIO URINE    Collection Time: 05/03/19  6:19 AM   Result Value Ref Range    Creatinine, Urine 93.00 mg/dL    Microalbumin, Urine Random <0.7 mg/dL    Micro Alb Creat Ratio see below 0 - 30 mg/g   HEMOGLOBIN A1C    Collection Time: 08/14/19  7:35 AM   Result Value Ref Range    Glycohemoglobin 6.1 (H) 0.0 - 5.6 %    Est Avg Glucose 128 mg/dL   ESTIMATED GFR    Collection Time: 08/14/19  7:35 AM   Result Value Ref Range    GFR If African American >60 >60 mL/min/1.73 m 2    GFR If Non African American >60 >60 mL/min/1.73 m 2   Comp Metabolic Panel    Collection Time: 08/14/19  7:35 AM   Result Value Ref Range    Sodium 140 135 - 145 mmol/L    Potassium 4.3 3.6 - 5.5 mmol/L    Chloride 106 96 - 112 mmol/L    Co2 25 20 - 33 mmol/L    Anion Gap 9.0 0.0 - 11.9    Glucose 96 65 - 99 mg/dL    Bun 16 8 - 22 mg/dL    Creatinine  0.94 0.50 - 1.40 mg/dL    Calcium 9.6 8.5 - 10.5 mg/dL    AST(SGOT) 17 12 - 45 U/L    ALT(SGPT) 18 2 - 50 U/L    Alkaline Phosphatase 97 30 - 99 U/L    Total Bilirubin 0.8 0.1 - 1.5 mg/dL    Albumin 4.1 3.2 - 4.9 g/dL    Total Protein 7.3 6.0 - 8.2 g/dL    Globulin 3.2 1.9 - 3.5 g/dL    A-G Ratio 1.3 g/dL   Lipid Profile    Collection Time: 08/14/19  7:35 AM   Result Value Ref Range    Cholesterol,Tot 145 100 - 199 mg/dL    Triglycerides 79 0 - 149 mg/dL    HDL 59 >=40 mg/dL    LDL 70 <100 mg/dL   FASTING STATUS    Collection Time: 08/14/19  7:35 AM   Result Value Ref Range    Fasting Status Fasting        Assessment:     1. Agatston CAC score, >400     2. Dyslipidemia     3. Essential hypertension, benign  lisinopril (PRINIVIL) 10 MG Tab    DISCONTINUED: lisinopril (PRINIVIL) 10 MG Tab   4. Mixed hyperlipidemia  atorvastatin (LIPITOR) 40 MG Tab    DISCONTINUED: atorvastatin (LIPITOR) 40 MG Tab       Medical Decision Making:  Today's Assessment / Status / Plan:     It was my pleasure to meet with Mr. Jiménez.    Blood pressure is well controlled.      He is on appropriate statin.    I will see Mr. Jiménez back in 1 year time and encouraged him to follow up with us over the phone or electronically using my MyChart as issues arise.    It is my pleasure to participate in the care of Mr. Jiménez.  Please do not hesitate to contact me with questions or concerns.    Malik Zabala MD PhD FAC  Cardiologist [facility] for [facility]    Please note that this dictation was created using voice recognition software. I have worked with consultants from the vendor as well as technical experts from [facility] to optimize the interface. I have made every reasonable attempt to correct obvious errors, but I expect that there are errors of grammar and possibly content I did not discover before finalizing the note.

## 2019-11-26 ENCOUNTER — HOSPITAL ENCOUNTER (OUTPATIENT)
Dept: LAB | Facility: MEDICAL CENTER | Age: 69
End: 2019-11-26
Attending: FAMILY MEDICINE
Payer: MEDICARE

## 2019-11-26 LAB
ALBUMIN SERPL BCP-MCNC: 4.2 G/DL (ref 3.2–4.9)
ALBUMIN/GLOB SERPL: 1.8 G/DL
ALP SERPL-CCNC: 92 U/L (ref 30–99)
ALT SERPL-CCNC: 19 U/L (ref 2–50)
ANION GAP SERPL CALC-SCNC: 9 MMOL/L (ref 0–11.9)
AST SERPL-CCNC: 20 U/L (ref 12–45)
BILIRUB SERPL-MCNC: 0.6 MG/DL (ref 0.1–1.5)
BUN SERPL-MCNC: 21 MG/DL (ref 8–22)
CALCIUM SERPL-MCNC: 9 MG/DL (ref 8.5–10.5)
CHLORIDE SERPL-SCNC: 104 MMOL/L (ref 96–112)
CHOLEST SERPL-MCNC: 165 MG/DL (ref 100–199)
CO2 SERPL-SCNC: 27 MMOL/L (ref 20–33)
CREAT SERPL-MCNC: 1.04 MG/DL (ref 0.5–1.4)
CREAT UR-MCNC: 89.8 MG/DL
EST. AVERAGE GLUCOSE BLD GHB EST-MCNC: 117 MG/DL
FASTING STATUS PATIENT QL REPORTED: NORMAL
GLOBULIN SER CALC-MCNC: 2.4 G/DL (ref 1.9–3.5)
GLUCOSE SERPL-MCNC: 86 MG/DL (ref 65–99)
HBA1C MFR BLD: 5.7 % (ref 0–5.6)
HDLC SERPL-MCNC: 70 MG/DL
LDLC SERPL CALC-MCNC: 81 MG/DL
MICROALBUMIN UR-MCNC: <0.7 MG/DL
MICROALBUMIN/CREAT UR: NORMAL MG/G (ref 0–30)
POTASSIUM SERPL-SCNC: 4 MMOL/L (ref 3.6–5.5)
PROT SERPL-MCNC: 6.6 G/DL (ref 6–8.2)
SODIUM SERPL-SCNC: 140 MMOL/L (ref 135–145)
TRIGL SERPL-MCNC: 72 MG/DL (ref 0–149)

## 2019-11-26 PROCEDURE — 80061 LIPID PANEL: CPT

## 2019-11-26 PROCEDURE — 80053 COMPREHEN METABOLIC PANEL: CPT

## 2019-11-26 PROCEDURE — 82043 UR ALBUMIN QUANTITATIVE: CPT

## 2019-11-26 PROCEDURE — 82570 ASSAY OF URINE CREATININE: CPT

## 2019-11-26 PROCEDURE — 36415 COLL VENOUS BLD VENIPUNCTURE: CPT

## 2019-11-26 PROCEDURE — 83036 HEMOGLOBIN GLYCOSYLATED A1C: CPT

## 2020-08-03 ENCOUNTER — APPOINTMENT (OUTPATIENT)
Dept: RADIOLOGY | Facility: IMAGING CENTER | Age: 70
End: 2020-08-03
Attending: NURSE PRACTITIONER
Payer: MEDICARE

## 2020-08-03 ENCOUNTER — OFFICE VISIT (OUTPATIENT)
Dept: URGENT CARE | Facility: CLINIC | Age: 70
End: 2020-08-03
Payer: MEDICARE

## 2020-08-03 VITALS
WEIGHT: 170 LBS | HEIGHT: 68 IN | DIASTOLIC BLOOD PRESSURE: 72 MMHG | TEMPERATURE: 97 F | HEART RATE: 63 BPM | OXYGEN SATURATION: 96 % | SYSTOLIC BLOOD PRESSURE: 114 MMHG | BODY MASS INDEX: 25.76 KG/M2 | RESPIRATION RATE: 16 BRPM

## 2020-08-03 DIAGNOSIS — M79.89 SWELLING OF LEFT FOOT: ICD-10-CM

## 2020-08-03 DIAGNOSIS — S90.852A FOREIGN BODY IN LEFT FOOT, INITIAL ENCOUNTER: ICD-10-CM

## 2020-08-03 PROCEDURE — 99214 OFFICE O/P EST MOD 30 MIN: CPT | Performed by: NURSE PRACTITIONER

## 2020-08-03 PROCEDURE — 73630 X-RAY EXAM OF FOOT: CPT | Mod: TC,LT | Performed by: NURSE PRACTITIONER

## 2020-08-03 ASSESSMENT — FIBROSIS 4 INDEX: FIB4 SCORE: .9176629354822470637

## 2020-08-03 ASSESSMENT — ENCOUNTER SYMPTOMS
NUMBNESS: 0
VERTIGO: 0
CHILLS: 0
SORE THROAT: 0
SHORTNESS OF BREATH: 0
WEAKNESS: 0
EYE REDNESS: 0
MYALGIAS: 0
ABDOMINAL PAIN: 0
DIZZINESS: 0
FEVER: 0
JOINT SWELLING: 1
VOMITING: 0
NAUSEA: 0

## 2020-08-04 ENCOUNTER — TELEPHONE (OUTPATIENT)
Dept: URGENT CARE | Facility: CLINIC | Age: 70
End: 2020-08-04

## 2020-08-04 NOTE — TELEPHONE ENCOUNTER
Pt. Is calling to see if he can get Antibiotics for his L foot called into [facility] on [address]  He had second thoughts and would like to start Antibiotics.
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
## 2020-08-05 ENCOUNTER — HOSPITAL ENCOUNTER (OUTPATIENT)
Dept: RADIOLOGY | Facility: MEDICAL CENTER | Age: 70
End: 2020-08-05
Attending: PHYSICIAN ASSISTANT
Payer: MEDICARE

## 2020-08-05 ENCOUNTER — OFFICE VISIT (OUTPATIENT)
Dept: URGENT CARE | Facility: CLINIC | Age: 70
End: 2020-08-05
Payer: MEDICARE

## 2020-08-05 VITALS
SYSTOLIC BLOOD PRESSURE: 112 MMHG | HEART RATE: 62 BPM | RESPIRATION RATE: 20 BRPM | DIASTOLIC BLOOD PRESSURE: 70 MMHG | BODY MASS INDEX: 26.07 KG/M2 | TEMPERATURE: 97.4 F | HEIGHT: 68 IN | WEIGHT: 172 LBS | OXYGEN SATURATION: 95 %

## 2020-08-05 DIAGNOSIS — L08.9 LEFT FOOT INFECTION: ICD-10-CM

## 2020-08-05 DIAGNOSIS — M67.472 GANGLION CYST OF LEFT FOOT: ICD-10-CM

## 2020-08-05 PROCEDURE — 76882 US LMTD JT/FCL EVL NVASC XTR: CPT | Mod: LT

## 2020-08-05 PROCEDURE — 99214 OFFICE O/P EST MOD 30 MIN: CPT | Performed by: PHYSICIAN ASSISTANT

## 2020-08-05 RX ORDER — SULFAMETHOXAZOLE AND TRIMETHOPRIM 800; 160 MG/1; MG/1
1 TABLET ORAL 2 TIMES DAILY
Qty: 20 TAB | Refills: 0 | Status: SHIPPED | OUTPATIENT
Start: 2020-08-05 | End: 2020-08-15

## 2020-08-05 ASSESSMENT — ENCOUNTER SYMPTOMS
DIARRHEA: 0
TINGLING: 0
SHORTNESS OF BREATH: 0
SEIZURES: 0
ABDOMINAL PAIN: 0
VOMITING: 0
LOSS OF CONSCIOUSNESS: 0
SPEECH CHANGE: 0
HEADACHES: 0
ORTHOPNEA: 0
WEAKNESS: 0
TREMORS: 0
NAUSEA: 0
COUGH: 0
CHILLS: 0
BLURRED VISION: 0
CLAUDICATION: 0
FOCAL WEAKNESS: 0
FEVER: 0
PALPITATIONS: 0
SENSORY CHANGE: 0
DOUBLE VISION: 0
DIZZINESS: 0

## 2020-08-05 ASSESSMENT — FIBROSIS 4 INDEX: FIB4 SCORE: .9176629354822470637

## 2020-08-05 NOTE — PATIENT INSTRUCTIONS
Ganglion Cyst    A ganglion cyst is a non-cancerous, fluid-filled lump that occurs near a joint or tendon. The cyst grows out of a joint or the lining of a tendon. Ganglion cysts most often develop in the hand or wrist, but they can also develop in the shoulder, elbow, hip, knee, ankle, or foot.  Ganglion cysts are ball-shaped or egg-shaped. Their size can range from the size of a pea to larger than a grape. Increased activity may cause the cyst to get bigger because more fluid starts to build up.  What are the causes?  The exact cause of this condition is not known, but it may be related to:  · Inflammation or irritation around the joint.  · An injury.  · Repetitive movements or overuse.  · Arthritis.  What increases the risk?  You are more likely to develop this condition if:  · You are a woman.  · You are 15-40 years old.  What are the signs or symptoms?  The main symptom of this condition is a lump. It most often appears on the hand or wrist. In many cases, there are no other symptoms, but a cyst can sometimes cause:  · Tingling.  · Pain.  · Numbness.  · Muscle weakness.  · Weak .  · Less range of motion in a joint.  How is this diagnosed?  Ganglion cysts are usually diagnosed based on a physical exam. Your health care provider will feel the lump and may shine a light next to it. If it is a ganglion cyst, the light will likely shine through it.  Your health care provider may order an X-ray, ultrasound, or MRI to rule out other conditions.  How is this treated?  Ganglion cysts often go away on their own without treatment. If you have pain or other symptoms, treatment may be needed. Treatment is also needed if the ganglion cyst limits your movement or if it gets infected. Treatment may include:  · Wearing a brace or splint on your wrist or finger.  · Taking anti-inflammatory medicine.  · Having fluid drained from the lump with a needle (aspiration).  · Getting a steroid injected into the joint.  · Having  surgery to remove the ganglion cyst.  · Placing a pad on your shoe or wearing shoes that will not rub against the cyst if it is on your foot.  Follow these instructions at home:  · Do not press on the ganglion cyst, poke it with a needle, or hit it.  · Take over-the-counter and prescription medicines only as told by your health care provider.  · If you have a brace or splint:  ? Wear it as told by your health care provider.  ? Remove it as told by your health care provider. Ask if you need to remove it when you take a shower or a bath.  · Watch your ganglion cyst for any changes.  · Keep all follow-up visits as told by your health care provider. This is important.  Contact a health care provider if:  · Your ganglion cyst becomes larger or more painful.  · You have pus coming from the lump.  · You have weakness or numbness in the affected area.  · You have a fever or chills.  Get help right away if:  · You have a fever and have any of these in the cyst area:  ? Increased redness.  ? Red streaks.  ? Swelling.  Summary  · A ganglion cyst is a non-cancerous, fluid-filled lump that occurs near a joint or tendon.  · Ganglion cysts most often develop in the hand or wrist, but they can also develop in the shoulder, elbow, hip, knee, ankle, or foot.  · Ganglion cysts often go away on their own without treatment.  This information is not intended to replace advice given to you by your health care provider. Make sure you discuss any questions you have with your health care provider.  Document Released: 12/15/2001 Document Revised: 11/30/2018 Document Reviewed: 08/17/2018  Elsevier Patient Education © 2020 [employer]

## 2020-08-05 NOTE — PROGRESS NOTES
Subjective:   Nelson Jiménez is a 69 y.o. male who presents for Foot Swelling (Almost 2 wks left foot swollen lump .)      Foot Swelling  This is a new problem. Episode onset: 2 weeks ago. The problem occurs constantly. Pertinent negatives include no abdominal pain, chest pain, chills, coughing, fever, headaches, nausea, rash, vomiting or weakness. Nothing aggravates the symptoms. He has tried nothing for the symptoms.       Review of Systems   Constitutional: Negative for chills and fever.   Eyes: Negative for blurred vision and double vision.   Respiratory: Negative for cough and shortness of breath.    Cardiovascular: Negative for chest pain, palpitations, orthopnea, claudication and leg swelling.   Gastrointestinal: Negative for abdominal pain, diarrhea, nausea and vomiting.   Musculoskeletal:        Foot swelling   Skin: Negative for rash.   Neurological: Negative for dizziness, tingling, tremors, sensory change, speech change, focal weakness, seizures, loss of consciousness, weakness and headaches.   All other systems reviewed and are negative.      Medications:    • aspirin EC Tbec  • atorvastatin Tabs  • CENTRUM SILVER 50+MEN PO  • diphenhydrAMINE Tabs  • fish oil Caps  • FLUoxetine Caps  • GREEN TEA PO  • lisinopril Tabs  • omeprazole  • zolpidem Tabs    Allergies: Penicillins    Problem List: Nelson Jiménez has Essential hypertension, benign; Depression; Seasonal allergies; Smoker; Agatston CAC score, >400; Right ureteral stone; Renal colic on right side; and Dyslipidemia on their problem list.    Surgical History:  Past Surgical History:   Procedure Laterality Date   • CYSTOSCOPY STENT PLACEMENT N/A 11/18/2018    Procedure: CYSTOSCOPY STENT PLACEMENT;  Surgeon: Jai Law M.D.;  Location: [facility];  Service: Urology   • URETEROSCOPY Right 11/18/2018    Procedure: URETEROSCOPY;  Surgeon: Jai Law M.D.;  Location: [facility];  Service: Urology   • LITHOTRIPSY  "N/A 11/18/2018    Procedure: LITHOTRIPSY;  Surgeon: Jai Law M.D.;  Location: SURGERY [facility];  Service: Urology   • OTHER ORTHOPEDIC SURGERY      L little finger fx repair       Past Social Hx: Nelson Jiménez  reports that he has been smoking cigars. He has never used smokeless tobacco. He reports current alcohol use of about 7.2 oz of alcohol per week. He reports that he does not use drugs.     Past Family Hx:  Nelson Jiménez family history includes Hyperlipidemia in his mother; Hypertension in his mother; Other in his father.     Problem list, medications, and allergies reviewed by myself today in Epic.     Objective:     Blood Pressure 112/70   Pulse 62   Temperature 36.3 °C (97.4 °F) (Temporal)   Respiration 20   Height 1.727 m (5' 8\")   Weight 78 kg (172 lb)   Oxygen Saturation 95%   Body Mass Index 26.15 kg/m²     Physical Exam  Vitals signs reviewed.   Constitutional:       Appearance: He is well-developed.   Cardiovascular:      Rate and Rhythm: Normal rate and regular rhythm.      Heart sounds: Normal heart sounds.   Pulmonary:      Effort: Pulmonary effort is normal.      Breath sounds: Normal breath sounds.   Musculoskeletal: Normal range of motion.   Skin:     General: Skin is warm and dry.      Findings: Rash present.      Comments: Rash distributions:  Lump on right mid foot.   Neurological:      Mental Status: He is alert and oriented to person, place, and time.   Psychiatric:         Behavior: Behavior normal.         Thought Content: Thought content normal.         Judgment: Judgment normal.         8/5/2020 9:59 AM     HISTORY/REASON FOR EXAM:  Likely ganglion cyst  LEFT foot lump.     TECHNIQUE/EXAM DESCRIPTION AND NUMBER OF VIEWS:  Sonographic evaluation of the LEFT foot.     COMPARISON: LEFT foot radiographs 8/3/2020     FINDINGS:  Images obtained over the dorsum of the LEFT midfoot show ill-defined low-density within the subcutaneous soft tissues measuring 3.0 x 0.5 x " 2.2 cm.  No discrete fluid collection demonstrated.     IMPRESSION:     Focal subcutaneous edema at the dorsal aspect LEFT foot corresponding to the area of interest, without discrete fluid collection.    Assessment/Plan:     Medical Decision Making/Comments     -lump on left mid foot x2 weeks. No surrounding erythema.  Lights up with flash light.  Likely ganglion cyst, however, US shows localized edema without evidence of discrete fluid collection encapsulated.  No evidence of infection.  He has referral to sports medicine/podiatry for further evaluation and treatment.   Diagnosis and associated orders     1. Ganglion cyst of left foot  US-EXTREMITY NON VASCULAR UNILATERAL LEFT    REFERRAL TO PODIATRY              Differential diagnosis, natural history, supportive care, and indications for immediate follow-up discussed.    Advised the patient to follow-up with the primary care physician for recheck, reevaluation, and consideration of further management.    Please note that this dictation was created using voice recognition software. I have made a reasonable attempt to correct obvious errors, but I expect that there are errors of grammar and possibly content that I did not discover before finalizing the note.

## 2020-08-10 NOTE — TELEPHONE ENCOUNTER
I contacted pt. And he states that he went into [facility] and they drained a Ganglion Cyst.  He is feeling much better.

## 2020-09-04 ENCOUNTER — HOSPITAL ENCOUNTER (OUTPATIENT)
Dept: LAB | Facility: MEDICAL CENTER | Age: 70
End: 2020-09-04
Attending: FAMILY MEDICINE
Payer: MEDICARE

## 2020-09-04 LAB
ALBUMIN SERPL BCP-MCNC: 4.2 G/DL (ref 3.2–4.9)
ALBUMIN/GLOB SERPL: 1.6 G/DL
ALP SERPL-CCNC: 97 U/L (ref 30–99)
ALT SERPL-CCNC: 17 U/L (ref 2–50)
ANION GAP SERPL CALC-SCNC: 9 MMOL/L (ref 7–16)
AST SERPL-CCNC: 16 U/L (ref 12–45)
BILIRUB SERPL-MCNC: 0.5 MG/DL (ref 0.1–1.5)
BUN SERPL-MCNC: 19 MG/DL (ref 8–22)
CALCIUM SERPL-MCNC: 9.4 MG/DL (ref 8.5–10.5)
CHLORIDE SERPL-SCNC: 105 MMOL/L (ref 96–112)
CHOLEST SERPL-MCNC: 161 MG/DL (ref 100–199)
CO2 SERPL-SCNC: 24 MMOL/L (ref 20–33)
CREAT SERPL-MCNC: 1.05 MG/DL (ref 0.5–1.4)
CREAT UR-MCNC: 117.34 MG/DL
EST. AVERAGE GLUCOSE BLD GHB EST-MCNC: 114 MG/DL
FASTING STATUS PATIENT QL REPORTED: NORMAL
GLOBULIN SER CALC-MCNC: 2.7 G/DL (ref 1.9–3.5)
GLUCOSE SERPL-MCNC: 104 MG/DL (ref 65–99)
HBA1C MFR BLD: 5.6 % (ref 0–5.6)
HDLC SERPL-MCNC: 78 MG/DL
LDLC SERPL CALC-MCNC: 76 MG/DL
MICROALBUMIN UR-MCNC: <1.2 MG/DL
MICROALBUMIN/CREAT UR: NORMAL MG/G (ref 0–30)
POTASSIUM SERPL-SCNC: 4.2 MMOL/L (ref 3.6–5.5)
PROT SERPL-MCNC: 6.9 G/DL (ref 6–8.2)
SODIUM SERPL-SCNC: 138 MMOL/L (ref 135–145)
TRIGL SERPL-MCNC: 35 MG/DL (ref 0–149)

## 2020-09-04 PROCEDURE — 36415 COLL VENOUS BLD VENIPUNCTURE: CPT | Mod: GA

## 2020-09-04 PROCEDURE — 82570 ASSAY OF URINE CREATININE: CPT

## 2020-09-04 PROCEDURE — 82043 UR ALBUMIN QUANTITATIVE: CPT

## 2020-09-04 PROCEDURE — 83036 HEMOGLOBIN GLYCOSYLATED A1C: CPT | Mod: GA

## 2020-09-04 PROCEDURE — 80053 COMPREHEN METABOLIC PANEL: CPT

## 2020-09-04 PROCEDURE — 80061 LIPID PANEL: CPT

## 2020-09-11 ENCOUNTER — OFFICE VISIT (OUTPATIENT)
Dept: CARDIOLOGY | Facility: MEDICAL CENTER | Age: 70
End: 2020-09-11
Payer: MEDICARE

## 2020-09-11 VITALS
DIASTOLIC BLOOD PRESSURE: 70 MMHG | SYSTOLIC BLOOD PRESSURE: 118 MMHG | WEIGHT: 171 LBS | BODY MASS INDEX: 25.91 KG/M2 | HEART RATE: 60 BPM | HEIGHT: 68 IN | OXYGEN SATURATION: 98 %

## 2020-09-11 DIAGNOSIS — E78.5 DYSLIPIDEMIA: ICD-10-CM

## 2020-09-11 DIAGNOSIS — I10 ESSENTIAL HYPERTENSION, BENIGN: ICD-10-CM

## 2020-09-11 DIAGNOSIS — R93.1 AGATSTON CAC SCORE, >400: ICD-10-CM

## 2020-09-11 PROCEDURE — 99213 OFFICE O/P EST LOW 20 MIN: CPT | Performed by: INTERNAL MEDICINE

## 2020-09-11 RX ORDER — OMEGA-3 FATTY ACIDS/FISH OIL 300-1000MG
600 CAPSULE ORAL EVERY 8 HOURS PRN
COMMUNITY
Start: 2020-06-25 | End: 2023-06-26

## 2020-09-11 RX ORDER — CLINDAMYCIN HYDROCHLORIDE 300 MG/1
CAPSULE ORAL
COMMUNITY
Start: 2020-06-25 | End: 2022-04-26

## 2020-09-11 ASSESSMENT — ENCOUNTER SYMPTOMS
PALPITATIONS: 0
FOCAL WEAKNESS: 0
PND: 0
NAUSEA: 0
WEAKNESS: 0
CLAUDICATION: 0
BRUISES/BLEEDS EASILY: 0
SORE THROAT: 0
ABDOMINAL PAIN: 0
FALLS: 0
COUGH: 0
BLURRED VISION: 0
SHORTNESS OF BREATH: 0
FEVER: 0
CHILLS: 0
DIZZINESS: 0

## 2020-09-11 ASSESSMENT — FIBROSIS 4 INDEX: FIB4 SCORE: 0.78

## 2020-09-11 NOTE — PATIENT INSTRUCTIONS
Work on at least 1.5 hours a week of moderate exercise (typical brisk walking or similar activity)    Please look into the following diets and incorporate them into your diet    LOW SALT DIET   KEEP YOUR SODIUM EQUAL TO CALORIES AND NO MORE THAN DOUBLE THE CALORIES FOR A LOW SALT DIET    Cardiosmart.org - great resource for American College of Cardiology on heart disease prevention and treatment      FOR TREATMENT OR PREVENTION OF CORONARY ARTERY DISEASE  These three programs are approved by Medicare/Insurers for those with heart disease  [place] [facility]  Dr. Ching[facility] Program for Reversing Heart Disease - [facility] Heath's Cardiologist vegetarian-based  [facility] - [place]-based mind-body Program    This is a commonly referenced Program  Dr Hawkins - [place] [facility] (book and documentary) - vegetarian-based      FOR TREATMENT OF BLOOD PRESSURE  DASH DIET - American Heart Association for treatment of HYPERTENSION    FOR TREATMENT OF BAD CHOLESTEROL/FATS  REDUCE PROCESSED SUGAR AS MUCH AS POSSIBLE  INCREASE WHOLE GRAINS/VEGETABLES    Lowering total cholesterol and LDL (bad) cholesterol:  - Eat leaner cuts of meat, or eliminate altogether if possible red meat, and frequently substitute fish or chicken.  - Limit saturated fat to no more than 7-10% of total calories no more than 10 g per day is recommended. Some sources of saturated fat include butter, animal fats, hydrogenated vegetable fats and oils, many desserts, whole milk dairy products.  - Replaced saturated fats with polyunsaturated fats and monounsaturated fats. Foods high in monounsaturated fat include nuts, although well, canola oil, avocados, and olives.  - Limit trans fat (processed foods) and replaced with fresh fruits and vegetables  - Recommend nonfat dairy products  - Increase substantially the amount of soluble fiber intake (legumes such as beans, fruit, whole grains).  - Consider  nutritional supplements: plant sterile spreads such as Benecol, fish oil,  flaxseed oil, omega-3 acids capsules 1000 mg twice a day, or viscous fiber such as Metamucil  - Attain ideal weight and regular exercise (at least 30 minutes per day of walking)    Lowering triglycerides:  - Reduce intake of simple sugar: Desserts, candy, pastries, honey, sodas, sugared cereals, yogurt, Gatorade, sports bars, canned fruit, smoothies, fruit juice, coffee drinks  - Reduced intake of refined starches: Refined Pasta  - Reduce or abstain from alcohol  - Increase omega-3 fatty acids: Hines, Trout, Mackerel, Herring, Albacore tuna and supplements  - Attain ideal weight and regular exercise (at least 30 minutes per day of walking)      Elevating HDL (good) cholesterol:  - Increase physical activity  - Seasoned foods with garlic and onions  - Increase omega-3 fatty acids and supplements as listed above  - Incorporating appropriate amounts of monounsaturated fats such as nuts, olive oil, canola oil, avocados, olives  - Stop smoking  - Attain ideal weight and regular exercise (at least 30 minutes per day of walking)

## 2020-09-12 NOTE — PROGRESS NOTES
Chief Complaint   Patient presents with   • Follow-Up     1 Year Follow Up Hypertension       Subjective:   Nelson Jiménez is a 69 y.o. male who presents today for follow-up of his history of age-appropriate coronary calcium score dyslipidemia    We really focused on improving his exercise over the last year that shows up in his HDL which is better labs look great his A1c is better    Past Medical History:   Diagnosis Date   • Anxiety    • Arthritis     loulou spine   • Backpain    • Degenerative joint disease of spine    • Essential hypertension, benign 12/21/2015   • Indigestion     GERD   • Psychiatric disorder     depression     Past Surgical History:   Procedure Laterality Date   • CYSTOSCOPY STENT PLACEMENT N/A 11/18/2018    Procedure: CYSTOSCOPY STENT PLACEMENT;  Surgeon: Jai Law M.D.;  Location: SURGERY [facility];  Service: Urology   • URETEROSCOPY Right 11/18/2018    Procedure: URETEROSCOPY;  Surgeon: Jai Law M.D.;  Location: [facility];  Service: Urology   • LITHOTRIPSY N/A 11/18/2018    Procedure: LITHOTRIPSY;  Surgeon: Jai Law M.D.;  Location: SURGERY [facility];  Service: Urology   • OTHER ORTHOPEDIC SURGERY      L little finger fx repair     Family History   Problem Relation Age of Onset   • Hypertension Mother    • Hyperlipidemia Mother    • Other Father         psorosis of the liver     Social History     Socioeconomic History   • Marital status:      Spouse name: Not on file   • Number of children: Not on file   • Years of education: Not on file   • Highest education level: Not on file   Occupational History   • Not on file   Social Needs   • Financial resource strain: Not on file   • Food insecurity     Worry: Not on file     Inability: Not on file   • Transportation needs     Medical: Not on file     Non-medical: Not on file   Tobacco Use   • Smoking status: Current Some Day Smoker     Types: Cigars   • Smokeless tobacco: Never Used    • Tobacco comment: 1-2 cigars per month   Substance and Sexual Activity   • Alcohol use: Yes     Alcohol/week: 7.2 oz     Types: 12 Cans of beer per week     Comment: 1-2 beers per week   • Drug use: No   • Sexual activity: Yes   Lifestyle   • Physical activity     Days per week: Not on file     Minutes per session: Not on file   • Stress: Not on file   Relationships   • Social connections     Talks on phone: Not on file     Gets together: Not on file     Attends [employer] service: Not on file     Active member of club or organization: Not on file     Attends meetings of clubs or organizations: Not on file     Relationship status: Not on file   • Intimate partner violence     Fear of current or ex partner: Not on file     Emotionally abused: Not on file     Physically abused: Not on file     Forced sexual activity: Not on file   Other Topics Concern   • Not on file   Social History Narrative   • Not on file     Allergies   Allergen Reactions   • Penicillins      Outpatient Encounter Medications as of 9/11/2020   Medication Sig Dispense Refill   • clindamycin (CLEOCIN) 300 MG Cap TAKE 1 CAPSULE BY MOUTH THREE TIMES DAILY FOR 7 DAYS     • ibuprofen (MOTRIN) 800 MG Tab Take 800 mg by mouth every 8 hours as needed.     • atorvastatin (LIPITOR) 40 MG Tab Take 1 Tab by mouth every bedtime. 90 Tab 3   • lisinopril (PRINIVIL) 10 MG Tab Take 1 Tab by mouth every day. 90 Tab 3   • aspirin EC (ECOTRIN) 81 MG Tablet Delayed Response Take 81 mg by mouth every day.     • Green Tea, Camillia sinensis, (GREEN TEA PO) Take 2 Tabs by mouth every morning.     • Omega-3 Fatty Acids (FISH OIL) 1000 MG Cap capsule Take 1,000 mg by mouth 2 Times a Day.     • Multiple Vitamins-Minerals (CENTRUM SILVER 50+MEN PO) Take 1 Tab by mouth every morning.     • diphenhydrAMINE (BENADRYL) 25 MG Tab Take 25 mg by mouth every 6 hours as needed for Sleep.     • zolpidem (AMBIEN) 10 MG Tab Take 10 mg by mouth at bedtime as needed for Sleep.     •  "FLUoxetine (PROZAC) 10 MG Cap Take 40 mg by mouth every day. Take 4  Capsules every morning      • omeprazole (PRILOSEC) 20 MG CPDR Take 20 mg by mouth every day.       No facility-administered encounter medications on file as of 9/11/2020.      Review of Systems   Constitutional: Negative for chills and fever.   HENT: Negative for sore throat.    Eyes: Negative for blurred vision.   Respiratory: Negative for cough and shortness of breath.    Cardiovascular: Negative for chest pain, palpitations, claudication, leg swelling and PND.   Gastrointestinal: Negative for abdominal pain and nausea.   Musculoskeletal: Negative for falls and joint pain.   Skin: Negative for rash.   Neurological: Negative for dizziness, focal weakness and weakness.   Endo/Heme/Allergies: Does not bruise/bleed easily.        Objective:   /70 (BP Location: Left arm, Patient Position: Sitting, BP Cuff Size: Adult)   Pulse 60   Ht 1.727 m (5' 8\")   Wt 77.6 kg (171 lb)   SpO2 98%   BMI 26.00 kg/m²     Physical Exam   Constitutional: No distress.   HENT:   Patient wearing a mask due to COVID precautions   Eyes: No scleral icterus.   Neck: No JVD present.   Cardiovascular: Normal rate and normal heart sounds. Exam reveals no gallop and no friction rub.   No murmur heard.  Pulmonary/Chest: No respiratory distress. He has no wheezes. He has no rales.   Abdominal: Soft. Bowel sounds are normal.   Musculoskeletal:         General: No edema.   Neurological: He is alert.   Skin: No rash noted. He is not diaphoretic.   Psychiatric: He has a normal mood and affect.     We reviewed in person the most recent labs  Recent Results (from the past 4032 hour(s))   FASTING STATUS    Collection Time: 09/04/20  7:21 AM   Result Value Ref Range    Fasting Status Fasting    HEMOGLOBIN A1C    Collection Time: 09/04/20  7:22 AM   Result Value Ref Range    Glycohemoglobin 5.6 0.0 - 5.6 %    Est Avg Glucose 114 mg/dL   MICROALBUMIN CREAT RATIO URINE    Collection " Time: 09/04/20  7:22 AM   Result Value Ref Range    Creatinine, Urine 117.34 mg/dL    Microalbumin, Urine Random <1.2 mg/dL    Micro Alb Creat Ratio see below 0 - 30 mg/g   Lipid Profile    Collection Time: 09/04/20  7:22 AM   Result Value Ref Range    Cholesterol,Tot 161 100 - 199 mg/dL    Triglycerides 35 0 - 149 mg/dL    HDL 78 >=40 mg/dL    LDL 76 <100 mg/dL   Comp Metabolic Panel    Collection Time: 09/04/20  7:22 AM   Result Value Ref Range    Sodium 138 135 - 145 mmol/L    Potassium 4.2 3.6 - 5.5 mmol/L    Chloride 105 96 - 112 mmol/L    Co2 24 20 - 33 mmol/L    Anion Gap 9.0 7.0 - 16.0    Glucose 104 (H) 65 - 99 mg/dL    Bun 19 8 - 22 mg/dL    Creatinine 1.05 0.50 - 1.40 mg/dL    Calcium 9.4 8.5 - 10.5 mg/dL    AST(SGOT) 16 12 - 45 U/L    ALT(SGPT) 17 2 - 50 U/L    Alkaline Phosphatase 97 30 - 99 U/L    Total Bilirubin 0.5 0.1 - 1.5 mg/dL    Albumin 4.2 3.2 - 4.9 g/dL    Total Protein 6.9 6.0 - 8.2 g/dL    Globulin 2.7 1.9 - 3.5 g/dL    A-G Ratio 1.6 g/dL   ESTIMATED GFR    Collection Time: 09/04/20  7:22 AM   Result Value Ref Range    GFR If African American >60 >60 mL/min/1.73 m 2    GFR If Non African American >60 >60 mL/min/1.73 m 2       Assessment:     1. Dyslipidemia     2. Agatston CAC score, >400     3. Essential hypertension, benign         Medical Decision Making:  Today's Assessment / Status / Plan:     It was my pleasure to meet with Mr. Jiménez.    Blood pressure is well controlled.  We specifically assessed the labs on hypertension treatment    He is on appropriate statin.    I will see Mr. Jiménez back in 1 year time and encouraged him to follow up with us over the phone or electronically using my MyChart as issues arise.    It is my pleasure to participate in the care of Mr. Jiménez.  Please do not hesitate to contact me with questions or concerns.    Malik Zabala MD PhD FACC  Cardiologist [facility]    Please note that this dictation was created using  voice recognition software. There may be errors I did not discover before finalizing the note.

## 2020-12-31 ENCOUNTER — APPOINTMENT (RX ONLY)
Dept: URBAN - METROPOLITAN AREA CLINIC 4 | Facility: CLINIC | Age: 70
Setting detail: DERMATOLOGY
End: 2020-12-31

## 2020-12-31 DIAGNOSIS — L81.4 OTHER MELANIN HYPERPIGMENTATION: ICD-10-CM

## 2020-12-31 DIAGNOSIS — D22 MELANOCYTIC NEVI: ICD-10-CM

## 2020-12-31 DIAGNOSIS — L82.1 OTHER SEBORRHEIC KERATOSIS: ICD-10-CM

## 2020-12-31 DIAGNOSIS — D18.0 HEMANGIOMA: ICD-10-CM

## 2020-12-31 DIAGNOSIS — Z87.2 PERSONAL HISTORY OF DISEASES OF THE SKIN AND SUBCUTANEOUS TISSUE: ICD-10-CM

## 2020-12-31 DIAGNOSIS — L57.0 ACTINIC KERATOSIS: ICD-10-CM

## 2020-12-31 PROBLEM — D18.01 HEMANGIOMA OF SKIN AND SUBCUTANEOUS TISSUE: Status: ACTIVE | Noted: 2020-12-31

## 2020-12-31 PROBLEM — D22.5 MELANOCYTIC NEVI OF TRUNK: Status: ACTIVE | Noted: 2020-12-31

## 2020-12-31 PROCEDURE — 17000 DESTRUCT PREMALG LESION: CPT

## 2020-12-31 PROCEDURE — 17003 DESTRUCT PREMALG LES 2-14: CPT

## 2020-12-31 PROCEDURE — 99214 OFFICE O/P EST MOD 30 MIN: CPT | Mod: 25

## 2020-12-31 PROCEDURE — ? COUNSELING

## 2020-12-31 PROCEDURE — ? LIQUID NITROGEN

## 2020-12-31 ASSESSMENT — LOCATION DETAILED DESCRIPTION DERM
LOCATION DETAILED: LEFT ULNAR DORSAL HAND
LOCATION DETAILED: LEFT CAVUM CONCHA
LOCATION DETAILED: LEFT INFERIOR CENTRAL MALAR CHEEK
LOCATION DETAILED: LEFT MEDIAL INFERIOR CHEST
LOCATION DETAILED: RIGHT SUPERIOR MEDIAL MALAR CHEEK
LOCATION DETAILED: EPIGASTRIC SKIN
LOCATION DETAILED: STERNUM
LOCATION DETAILED: LEFT ANTERIOR DISTAL UPPER ARM
LOCATION DETAILED: PERIUMBILICAL SKIN
LOCATION DETAILED: RIGHT RADIAL DORSAL HAND

## 2020-12-31 ASSESSMENT — LOCATION SIMPLE DESCRIPTION DERM
LOCATION SIMPLE: CHEST
LOCATION SIMPLE: RIGHT CHEEK
LOCATION SIMPLE: RIGHT HAND
LOCATION SIMPLE: ABDOMEN
LOCATION SIMPLE: LEFT CHEEK
LOCATION SIMPLE: LEFT HAND
LOCATION SIMPLE: LEFT UPPER ARM
LOCATION SIMPLE: LEFT EAR

## 2020-12-31 ASSESSMENT — LOCATION ZONE DERM
LOCATION ZONE: EAR
LOCATION ZONE: ARM
LOCATION ZONE: HAND
LOCATION ZONE: TRUNK
LOCATION ZONE: FACE

## 2021-01-15 DIAGNOSIS — Z23 NEED FOR VACCINATION: ICD-10-CM

## 2021-01-26 ENCOUNTER — IMMUNIZATION (OUTPATIENT)
Dept: FAMILY PLANNING/WOMEN'S HEALTH CLINIC | Facility: IMMUNIZATION CENTER | Age: 71
End: 2021-01-26
Attending: INTERNAL MEDICINE
Payer: MEDICARE

## 2021-01-26 DIAGNOSIS — Z23 ENCOUNTER FOR VACCINATION: Primary | ICD-10-CM

## 2021-01-26 DIAGNOSIS — Z23 NEED FOR VACCINATION: ICD-10-CM

## 2021-01-26 PROCEDURE — 91300 PFIZER SARS-COV-2 VACCINE: CPT | Performed by: PHYSICIAN ASSISTANT

## 2021-01-26 PROCEDURE — 0001A PFIZER SARS-COV-2 VACCINE: CPT | Performed by: PHYSICIAN ASSISTANT

## 2021-02-17 PROCEDURE — 0002A PFIZER SARS-COV-2 VACCINE: CPT | Performed by: INTERNAL MEDICINE

## 2021-02-17 PROCEDURE — 91300 PFIZER SARS-COV-2 VACCINE: CPT | Performed by: INTERNAL MEDICINE

## 2021-02-18 ENCOUNTER — IMMUNIZATION (OUTPATIENT)
Dept: FAMILY PLANNING/WOMEN'S HEALTH CLINIC | Facility: IMMUNIZATION CENTER | Age: 71
End: 2021-02-18
Attending: INTERNAL MEDICINE
Payer: MEDICARE

## 2021-02-18 DIAGNOSIS — Z23 ENCOUNTER FOR VACCINATION: Primary | ICD-10-CM

## 2021-03-15 ENCOUNTER — HOSPITAL ENCOUNTER (OUTPATIENT)
Dept: LAB | Facility: MEDICAL CENTER | Age: 71
End: 2021-03-15
Attending: FAMILY MEDICINE
Payer: MEDICARE

## 2021-03-15 LAB
ALBUMIN SERPL BCP-MCNC: 4.1 G/DL (ref 3.2–4.9)
ALBUMIN/GLOB SERPL: 1.5 G/DL
ALP SERPL-CCNC: 101 U/L (ref 30–99)
ALT SERPL-CCNC: 18 U/L (ref 2–50)
ANION GAP SERPL CALC-SCNC: 10 MMOL/L (ref 7–16)
AST SERPL-CCNC: 18 U/L (ref 12–45)
BILIRUB SERPL-MCNC: 0.5 MG/DL (ref 0.1–1.5)
BUN SERPL-MCNC: 19 MG/DL (ref 8–22)
CALCIUM SERPL-MCNC: 9.1 MG/DL (ref 8.5–10.5)
CHLORIDE SERPL-SCNC: 102 MMOL/L (ref 96–112)
CHOLEST SERPL-MCNC: 158 MG/DL (ref 100–199)
CO2 SERPL-SCNC: 23 MMOL/L (ref 20–33)
CREAT SERPL-MCNC: 0.88 MG/DL (ref 0.5–1.4)
CREAT UR-MCNC: 53.91 MG/DL
EST. AVERAGE GLUCOSE BLD GHB EST-MCNC: 103 MG/DL
FASTING STATUS PATIENT QL REPORTED: NORMAL
GLOBULIN SER CALC-MCNC: 2.7 G/DL (ref 1.9–3.5)
GLUCOSE SERPL-MCNC: 91 MG/DL (ref 65–99)
HBA1C MFR BLD: 5.2 % (ref 4–5.6)
HDLC SERPL-MCNC: 61 MG/DL
LDLC SERPL CALC-MCNC: 83 MG/DL
MICROALBUMIN UR-MCNC: <1.2 MG/DL
MICROALBUMIN/CREAT UR: NORMAL MG/G (ref 0–30)
POTASSIUM SERPL-SCNC: 4.2 MMOL/L (ref 3.6–5.5)
PROT SERPL-MCNC: 6.8 G/DL (ref 6–8.2)
PSA SERPL-MCNC: 1.1 NG/ML (ref 0–4)
SODIUM SERPL-SCNC: 135 MMOL/L (ref 135–145)
TRIGL SERPL-MCNC: 71 MG/DL (ref 0–149)

## 2021-03-15 PROCEDURE — 82043 UR ALBUMIN QUANTITATIVE: CPT

## 2021-03-15 PROCEDURE — 83036 HEMOGLOBIN GLYCOSYLATED A1C: CPT | Mod: GA

## 2021-03-15 PROCEDURE — 82570 ASSAY OF URINE CREATININE: CPT

## 2021-03-15 PROCEDURE — 36415 COLL VENOUS BLD VENIPUNCTURE: CPT | Mod: GA

## 2021-03-15 PROCEDURE — 80053 COMPREHEN METABOLIC PANEL: CPT

## 2021-03-15 PROCEDURE — 84153 ASSAY OF PSA TOTAL: CPT | Mod: GA

## 2021-03-15 PROCEDURE — 80061 LIPID PANEL: CPT

## 2021-05-20 ENCOUNTER — OFFICE VISIT (OUTPATIENT)
Dept: URGENT CARE | Facility: CLINIC | Age: 71
End: 2021-05-20
Payer: MEDICARE

## 2021-05-20 VITALS
OXYGEN SATURATION: 94 % | WEIGHT: 172 LBS | BODY MASS INDEX: 26.07 KG/M2 | SYSTOLIC BLOOD PRESSURE: 118 MMHG | HEART RATE: 63 BPM | DIASTOLIC BLOOD PRESSURE: 70 MMHG | HEIGHT: 68 IN | RESPIRATION RATE: 16 BRPM | TEMPERATURE: 98.4 F

## 2021-05-20 DIAGNOSIS — Z86.69 HISTORY OF IMPACTED CERUMEN: ICD-10-CM

## 2021-05-20 DIAGNOSIS — H93.8X3 SENSATION OF FULLNESS IN BOTH EARS: ICD-10-CM

## 2021-05-20 DIAGNOSIS — Z97.4 DOES USE HEARING AID: ICD-10-CM

## 2021-05-20 PROCEDURE — 99213 OFFICE O/P EST LOW 20 MIN: CPT | Performed by: PHYSICIAN ASSISTANT

## 2021-05-20 ASSESSMENT — ENCOUNTER SYMPTOMS
HEADACHES: 0
DIZZINESS: 0

## 2021-05-20 NOTE — PROGRESS NOTES
"Subjective:      Nelson Jiménez is a 70 y.o. male who presents with Ear Fullness (both ears )            Patient is a pleasant 70-year-old male who presents to urgent care requesting evaluation for cerumen impaction.  Patient reports long history of same in the past of which he is required lavage-he does report over the last several weeks intermittent ear fullness in particular in the left.  He does report new hearing aids approximately 2 months ago of which he does feel that this has caused \"buildup of wax \".  He admits he has been utilizing ear softening drops wax however \"nothing comes out \".  In general he does report decreased hearing however is uncertain if this is unchanged from baseline.  Denies any ear pain or recent illness.    Ear Fullness  This is a new problem. The current episode started 1 to 4 weeks ago. The problem occurs intermittently. The problem has been waxing and waning (None now. ). Pertinent negatives include no headaches. Nothing aggravates the symptoms. Treatments tried: wax softening drops.        Review of Systems   HENT: Positive for hearing loss. Negative for ear discharge, ear pain and tinnitus.    Neurological: Negative for dizziness and headaches.   All other systems reviewed and are negative.         Objective:     /70   Pulse 63   Temp 36.9 °C (98.4 °F) (Temporal)   Resp 16   Ht 1.727 m (5' 8\")   Wt 78 kg (172 lb)   SpO2 94%   BMI 26.15 kg/m²    PMH:  has a past medical history of Anxiety, Arthritis, Backpain, Degenerative joint disease of spine, Essential hypertension, benign (12/21/2015), Indigestion, and Psychiatric disorder. He also has no past medical history of Angina, Arrhythmia, ASTHMA, Bronchitis, Cancer (HCC), CATARACT, Congestive heart failure (HCC), COPD, Diabetes, Dialysis, Glaucoma, Heart murmur, Heart valve disease, Infectious disease, Jaundice, Myocardial infarct (HCC), Pacemaker, Personal history of venous thrombosis and embolism, Pneumonia, Renal " disorder, Rheumatic fever, Seizure (HCC), Stroke (HCC), Unspecified disorder of thyroid, Unspecified hemorrhagic conditions, or Unspecified urinary incontinence.  MEDS: Reviewed .   ALLERGIES:   Allergies   Allergen Reactions   • Penicillins      SURGHX:   Past Surgical History:   Procedure Laterality Date   • CYSTOSCOPY STENT PLACEMENT N/A 11/18/2018    Procedure: CYSTOSCOPY STENT PLACEMENT;  Surgeon: Jai Law M.D.;  Location: SURGERY [facility];  Service: Urology   • URETEROSCOPY Right 11/18/2018    Procedure: URETEROSCOPY;  Surgeon: Jai Law M.D.;  Location: SURGERY [facility];  Service: Urology   • LITHOTRIPSY N/A 11/18/2018    Procedure: LITHOTRIPSY;  Surgeon: Jai Law M.D.;  Location: [facility];  Service: Urology   • OTHER ORTHOPEDIC SURGERY      L little finger fx repair     SOCHX:  reports that he has been smoking cigars. He has never used smokeless tobacco. He reports current alcohol use of about 7.2 oz of alcohol per week. He reports that he does not use drugs.  FH: Family history was reviewed, no pertinent findings to report    Physical Exam  Vitals reviewed.   Constitutional:       General: He is not in acute distress.     Appearance: He is well-developed.   HENT:      Head: Normocephalic and atraumatic.      Ears:      Comments: Bilateral TM dullness- without noted cerumen to the canal. NT auricle and tragus.   Eyes:      Conjunctiva/sclera: Conjunctivae normal.      Pupils: Pupils are equal, round, and reactive to light.   Neck:      Trachea: No tracheal deviation.   Cardiovascular:      Rate and Rhythm: Normal rate.   Pulmonary:      Effort: Pulmonary effort is normal. No respiratory distress.   Musculoskeletal:      Cervical back: Normal range of motion and neck supple.   Skin:     General: Skin is warm.   Neurological:      Mental Status: He is alert and oriented to person, place, and time.      Coordination: Coordination normal.   Psychiatric:          Behavior: Behavior normal.         Thought Content: Thought content normal.         Judgment: Judgment normal.                        Assessment/Plan:        1. Sensation of fullness in both ears  2. Does use hearing aid  3. History of impacted cerumen    [facility]-patient is with wide open auditory canal with dull TMs however otherwise without infectious process.  He is to continue with ear softening drops if he feels that this may need different as he currently does not have any symptoms today.    Appropriate PPE worn at all times by provider.   Pt. Had face mask on throughout entirety of the visit other than oropharyngeal examination today.     DDX, Supportive care, and indications for immediate follow-up discussed with patient.    Instructed to return to clinic or nearest emergency department if we are not available for any change in condition, further concerns, or worsening of symptoms.    The patient and/or guardian demonstrated a good understanding and agreed with the treatment plan.    Please note that this dictation was created using voice recognition software. I have made every reasonable attempt to correct obvious errors, but I expect that there are errors of grammar and possibly content that I did not discover before finalizing the note.

## 2022-02-22 ENCOUNTER — APPOINTMENT (RX ONLY)
Dept: URBAN - METROPOLITAN AREA CLINIC 6 | Facility: CLINIC | Age: 72
Setting detail: DERMATOLOGY
End: 2022-02-22

## 2022-02-22 DIAGNOSIS — L81.4 OTHER MELANIN HYPERPIGMENTATION: ICD-10-CM

## 2022-02-22 DIAGNOSIS — D18.0 HEMANGIOMA: ICD-10-CM

## 2022-02-22 DIAGNOSIS — L82.0 INFLAMED SEBORRHEIC KERATOSIS: ICD-10-CM

## 2022-02-22 DIAGNOSIS — Z87.2 PERSONAL HISTORY OF DISEASES OF THE SKIN AND SUBCUTANEOUS TISSUE: ICD-10-CM | Status: STABLE

## 2022-02-22 DIAGNOSIS — D22 MELANOCYTIC NEVI: ICD-10-CM

## 2022-02-22 DIAGNOSIS — Z71.89 OTHER SPECIFIED COUNSELING: ICD-10-CM

## 2022-02-22 DIAGNOSIS — L82.1 OTHER SEBORRHEIC KERATOSIS: ICD-10-CM

## 2022-02-22 PROBLEM — D22.5 MELANOCYTIC NEVI OF TRUNK: Status: ACTIVE | Noted: 2022-02-22

## 2022-02-22 PROBLEM — D18.01 HEMANGIOMA OF SKIN AND SUBCUTANEOUS TISSUE: Status: ACTIVE | Noted: 2022-02-22

## 2022-02-22 PROCEDURE — 17110 DESTRUCTION B9 LES UP TO 14: CPT

## 2022-02-22 PROCEDURE — 99213 OFFICE O/P EST LOW 20 MIN: CPT | Mod: 25

## 2022-02-22 PROCEDURE — ? SUNSCREEN TREATMENT REGIMEN

## 2022-02-22 PROCEDURE — ? LIQUID NITROGEN

## 2022-02-22 PROCEDURE — ? COUNSELING

## 2022-02-22 ASSESSMENT — LOCATION SIMPLE DESCRIPTION DERM
LOCATION SIMPLE: RIGHT TEMPLE
LOCATION SIMPLE: UPPER BACK
LOCATION SIMPLE: RIGHT HAND
LOCATION SIMPLE: LEFT EYEBROW
LOCATION SIMPLE: LEFT UPPER ARM
LOCATION SIMPLE: LEFT FOREHEAD
LOCATION SIMPLE: LEFT HAND
LOCATION SIMPLE: LEFT UPPER ARM
LOCATION SIMPLE: LEFT CHEEK
LOCATION SIMPLE: CHEST
LOCATION SIMPLE: LEFT UPPER BACK
LOCATION SIMPLE: RIGHT FOREHEAD
LOCATION SIMPLE: RIGHT SHOULDER
LOCATION SIMPLE: LEFT TEMPLE
LOCATION SIMPLE: ABDOMEN
LOCATION SIMPLE: LEFT SHOULDER

## 2022-02-22 ASSESSMENT — LOCATION DETAILED DESCRIPTION DERM
LOCATION DETAILED: LEFT INFERIOR PREAURICULAR CHEEK
LOCATION DETAILED: LEFT CENTRAL EYEBROW
LOCATION DETAILED: LEFT ULNAR DORSAL HAND
LOCATION DETAILED: LEFT ANTERIOR DISTAL UPPER ARM
LOCATION DETAILED: STERNUM
LOCATION DETAILED: RIGHT INFERIOR FOREHEAD
LOCATION DETAILED: EPIGASTRIC SKIN
LOCATION DETAILED: SUPERIOR THORACIC SPINE
LOCATION DETAILED: RIGHT CENTRAL TEMPLE
LOCATION DETAILED: LEFT POSTERIOR SHOULDER
LOCATION DETAILED: LEFT ANTERIOR DISTAL UPPER ARM
LOCATION DETAILED: LEFT SUPERIOR MEDIAL UPPER BACK
LOCATION DETAILED: LEFT CENTRAL TEMPLE
LOCATION DETAILED: LEFT INFERIOR FOREHEAD
LOCATION DETAILED: RIGHT POSTERIOR SHOULDER
LOCATION DETAILED: LEFT MEDIAL INFERIOR CHEST
LOCATION DETAILED: RIGHT RADIAL DORSAL HAND

## 2022-02-22 ASSESSMENT — LOCATION ZONE DERM
LOCATION ZONE: FACE
LOCATION ZONE: ARM
LOCATION ZONE: HAND
LOCATION ZONE: TRUNK
LOCATION ZONE: ARM

## 2022-02-22 NOTE — PROCEDURE: LIQUID NITROGEN
Duration Of Freeze Thaw-Cycle (Seconds): 10-15
Show Spray Paint Technique Variable?: Yes
Include Z78.9 (Other Specified Conditions Influencing Health Status) As An Associated Diagnosis?: No
Post-Care Instructions: I reviewed with the patient in detail post-care instructions. Patient is to wear sunprotection, and avoid picking at any of the treated lesions. Pt may apply Vaseline to crusted or scabbing areas.
Medical Necessity Clause: This procedure was medically necessary because the lesions that were treated were:
Detail Level: Detailed
Medical Necessity Information: It is in your best interest to select a reason for this procedure from the list below. All of these items fulfill various CMS LCD requirements except the new and changing color options.
Spray Paint Text: The liquid nitrogen was applied to the skin utilizing a spray paint frosting technique.
Consent: The patient's consent was obtained including but not limited to risks of crusting, scabbing, blistering, scarring, darker or lighter pigmentary change, recurrence, incomplete removal and infection.
Number Of Freeze-Thaw Cycles: 3 freeze-thaw cycles

## 2022-04-26 ENCOUNTER — OFFICE VISIT (OUTPATIENT)
Dept: CARDIOLOGY | Facility: MEDICAL CENTER | Age: 72
End: 2022-04-26
Payer: MEDICARE

## 2022-04-26 VITALS
WEIGHT: 181 LBS | HEART RATE: 68 BPM | DIASTOLIC BLOOD PRESSURE: 70 MMHG | RESPIRATION RATE: 18 BRPM | HEIGHT: 68 IN | BODY MASS INDEX: 27.43 KG/M2 | OXYGEN SATURATION: 97 % | SYSTOLIC BLOOD PRESSURE: 130 MMHG

## 2022-04-26 DIAGNOSIS — E78.2 MIXED HYPERLIPIDEMIA: ICD-10-CM

## 2022-04-26 DIAGNOSIS — E78.5 DYSLIPIDEMIA: ICD-10-CM

## 2022-04-26 DIAGNOSIS — I10 ESSENTIAL HYPERTENSION, BENIGN: ICD-10-CM

## 2022-04-26 DIAGNOSIS — R93.1 AGATSTON CAC SCORE, >400: ICD-10-CM

## 2022-04-26 PROBLEM — R73.03 PREDIABETES: Status: ACTIVE | Noted: 2021-12-27

## 2022-04-26 PROCEDURE — 99214 OFFICE O/P EST MOD 30 MIN: CPT | Performed by: INTERNAL MEDICINE

## 2022-04-26 RX ORDER — ATORVASTATIN CALCIUM 40 MG/1
40 TABLET, FILM COATED ORAL DAILY
COMMUNITY
End: 2022-04-26

## 2022-04-26 RX ORDER — LISINOPRIL 10 MG/1
10 TABLET ORAL DAILY
Qty: 90 TABLET | Refills: 3 | Status: SHIPPED | OUTPATIENT
Start: 2022-04-26 | End: 2023-06-26 | Stop reason: SDUPTHER

## 2022-04-26 RX ORDER — ATORVASTATIN CALCIUM 40 MG/1
40 TABLET, FILM COATED ORAL
Qty: 90 TABLET | Refills: 3 | Status: SHIPPED | OUTPATIENT
Start: 2022-04-26

## 2022-04-26 NOTE — PROGRESS NOTES
Chief Complaint   Patient presents with   • Dyslipidemia       Subjective     Nelson Jiménez is a 71 y.o. male who presents today for follow-up of his history of age-appropriate calcium score dyslipidemia hypertension    He is done well over the past year tolerating his medications well    Past Medical History:   Diagnosis Date   • Anxiety    • Arthritis     loulou spine   • Backpain    • Degenerative joint disease of spine    • Essential hypertension, benign 12/21/2015   • Indigestion     GERD   • Psychiatric disorder     depression     Past Surgical History:   Procedure Laterality Date   • CYSTOSCOPY STENT PLACEMENT N/A 11/18/2018    Procedure: CYSTOSCOPY STENT PLACEMENT;  Surgeon: Jai Law M.D.;  Location: [facility];  Service: Urology   • URETEROSCOPY Right 11/18/2018    Procedure: URETEROSCOPY;  Surgeon: Jai Law M.D.;  Location: [facility];  Service: Urology   • LITHOTRIPSY N/A 11/18/2018    Procedure: LITHOTRIPSY;  Surgeon: Jai Law M.D.;  Location: SURGERY [facility];  Service: Urology   • OTHER ORTHOPEDIC SURGERY      L little finger fx repair     Family History   Problem Relation Age of Onset   • Hypertension Mother    • Hyperlipidemia Mother    • Other Father         psorosis of the liver     Social History     Socioeconomic History   • Marital status:      Spouse name: Not on file   • Number of children: Not on file   • Years of education: Not on file   • Highest education level: Not on file   Occupational History   • Not on file   Tobacco Use   • Smoking status: Current Some Day Smoker     Types: Cigars   • Smokeless tobacco: Never Used   • Tobacco comment: 1-2 cigars per month   Vaping Use   • Vaping Use: Never used   Substance and Sexual Activity   • Alcohol use: Yes     Alcohol/week: 6.0 oz     Types: 10 Cans of beer per week     Comment: every other day   • Drug use: No   • Sexual activity: Yes   Other Topics Concern   • Not on  file   Social History Narrative   • Not on file     Social Determinants of Health     Financial Resource Strain: Not on file   Food Insecurity: Not on file   Transportation Needs: Not on file   Physical Activity: Not on file   Stress: Not on file   Social Connections: Not on file   Intimate Partner Violence: Not on file   Housing Stability: Not on file     Allergies   Allergen Reactions   • Penicillamine    • Penicillins    • Acetaminophen Itching     Other reaction(s): depression  Other reaction(s): depression     • Oxycodone Itching     Other reaction(s): depression  Other reaction(s): depression       Outpatient Encounter Medications as of 4/26/2022   Medication Sig Dispense Refill   • ibuprofen (MOTRIN) 800 MG Tab Take 800 mg by mouth every 8 hours as needed.     • atorvastatin (LIPITOR) 40 MG Tab Take 1 Tab by mouth every bedtime. 90 Tab 3   • lisinopril (PRINIVIL) 10 MG Tab Take 1 Tab by mouth every day. 90 Tab 3   • Multiple Vitamins-Minerals (CENTRUM SILVER 50+MEN PO) Take 1 Tab by mouth every morning.     • zolpidem (AMBIEN) 10 MG Tab Take 10 mg by mouth at bedtime as needed for Sleep.     • FLUoxetine (PROZAC) 10 MG Cap Take 40 mg by mouth every day. Take 4  Capsules every morning     • omeprazole (PRILOSEC) 20 MG CPDR Take 20 mg by mouth every day.     • [DISCONTINUED] atorvastatin (LIPITOR) 40 MG Tab Take 40 mg by mouth every day. (Patient not taking: Reported on 4/26/2022)     • [DISCONTINUED] diphenhydramine (SOMINEX) 25 MG tablet Take 25 mg by mouth. (Patient not taking: Reported on 4/26/2022)     • [DISCONTINUED] clindamycin (CLEOCIN) 300 MG Cap TAKE 1 CAPSULE BY MOUTH THREE TIMES DAILY FOR 7 DAYS (Patient not taking: No sig reported)     • aspirin EC (ECOTRIN) 81 MG Tablet Delayed Response Take 81 mg by mouth every day. (Patient not taking: No sig reported)     • [DISCONTINUED] Green Tea, Camillia sinensis, (GREEN TEA PO) Take 2 Tabs by mouth every morning. (Patient not taking: No sig reported)    "  • [DISCONTINUED] Omega-3 Fatty Acids (FISH OIL) 1000 MG Cap capsule Take 1,000 mg by mouth 2 Times a Day. (Patient not taking: Reported on 4/26/2022)     • [DISCONTINUED] diphenhydrAMINE (BENADRYL) 25 MG Tab Take 25 mg by mouth every 6 hours as needed for Sleep. (Patient not taking: Reported on 4/26/2022)       No facility-administered encounter medications on file as of 4/26/2022.     ROS           Objective     /70 (BP Location: Left arm, Patient Position: Sitting, BP Cuff Size: Adult)   Pulse 68   Resp 18   Ht 1.727 m (5' 8\")   Wt 82.1 kg (181 lb)   SpO2 97%   BMI 27.52 kg/m²     Physical Exam  Constitutional:       General: He is not in acute distress.     Appearance: He is not diaphoretic.   Eyes:      General: No scleral icterus.  Neck:      Vascular: No JVD.   Cardiovascular:      Rate and Rhythm: Normal rate.      Heart sounds: Normal heart sounds. No murmur heard.    No friction rub. No gallop.   Pulmonary:      Effort: No respiratory distress.      Breath sounds: No wheezing or rales.   Abdominal:      General: Bowel sounds are normal.      Palpations: Abdomen is soft.   Skin:     Findings: No rash.   Neurological:      Mental Status: He is alert.                  Labs at lab tonja  Assessment & Plan     1. Agatston CAC score, >400     2. Dyslipidemia     3. Essential hypertension, benign  lisinopril (PRINIVIL) 10 MG Tab   4. Mixed hyperlipidemia  atorvastatin (LIPITOR) 40 MG Tab       Medical Decision Making: Today's Assessment/Status/Plan:        It was my pleasure to meet with Mr. Jiménez.    We addressed the management of hypertension at today's visit. Blood pressure is well controlled.  We specifically assessed the labs on hypertension treatment    We addressed the management of dyslipidemia at today's visit. He is on appropriate statin.    I will see Mr. Jiménez back in 1 year time and encouraged him to follow up with us over the phone or electronically using my MyChart as issues " arise.    It is my pleasure to participate in the care of Mr. Jiménez.  Please do not hesitate to contact me with questions or concerns.    Malik Zabala MD PhD [facility]  Cardiologist [facility]    Please note that this dictation was created using voice recognition software. There may be errors I did not discover before finalizing the note.

## 2022-04-27 ENCOUNTER — HOSPITAL ENCOUNTER (EMERGENCY)
Facility: MEDICAL CENTER | Age: 72
End: 2022-04-27
Attending: EMERGENCY MEDICINE
Payer: MEDICARE

## 2022-04-27 VITALS
BODY MASS INDEX: 27.5 KG/M2 | OXYGEN SATURATION: 98 % | TEMPERATURE: 97.7 F | SYSTOLIC BLOOD PRESSURE: 143 MMHG | RESPIRATION RATE: 18 BRPM | WEIGHT: 181.44 LBS | HEIGHT: 68 IN | HEART RATE: 71 BPM | DIASTOLIC BLOOD PRESSURE: 85 MMHG

## 2022-04-27 DIAGNOSIS — S01.01XA LACERATION OF SCALP, INITIAL ENCOUNTER: ICD-10-CM

## 2022-04-27 PROCEDURE — 700101 HCHG RX REV CODE 250: Performed by: EMERGENCY MEDICINE

## 2022-04-27 PROCEDURE — 303747 HCHG EXTRA SUTURE

## 2022-04-27 PROCEDURE — 304999 HCHG REPAIR-SIMPLE/INTERMED LEVEL 1

## 2022-04-27 PROCEDURE — 304217 HCHG IRRIGATION SYSTEM

## 2022-04-27 PROCEDURE — 99282 EMERGENCY DEPT VISIT SF MDM: CPT

## 2022-04-27 RX ORDER — LIDOCAINE HYDROCHLORIDE AND EPINEPHRINE 10; 10 MG/ML; UG/ML
20 INJECTION, SOLUTION INFILTRATION; PERINEURAL ONCE
Status: COMPLETED | OUTPATIENT
Start: 2022-04-27 | End: 2022-04-27

## 2022-04-27 RX ADMIN — LIDOCAINE HYDROCHLORIDE AND EPINEPHRINE 20 ML: 10; 10 INJECTION, SOLUTION INFILTRATION; PERINEURAL at 14:15

## 2022-04-27 NOTE — ED NOTES
Bacitracin applied per ERP, Pt tolerated well;    At-home laceration care education provided, Pt verbalized understanding;

## 2022-04-27 NOTE — ED PROVIDER NOTES
ED Provider Note      CHIEF COMPLAINT  Chief Complaint   Patient presents with   • Head Injury     Hit head against a tree and cut top of head        HPI  Nelson Jiménez is a 71 y.o. male who presents     71-year-old gentleman with a laceration to the head.  Occurred prior to arrival.  With a branch.  There is no significant loss of consciousness or dizziness.  Patient states that he just wants to get laceration repair is less concerning is going on a trip to [place] and is leaving in a week so he is concerned that sutures may need to stay in.    Denies any loss of consciousness  Denies any blood thinners.  This will be assessed as per nursing  Last tetanus was 4 years ago.    Patient was clearing up that branches around his house he had cut one half to treat.  He then hit his head without a hat or protective head where on the cut branch end.  He did not lose consciousness.  No eye trauma    REVIEW OF SYSTEMS  See HPI for further details. All other systems are negative.     PAST MEDICAL HISTORY  Past Medical History:   Diagnosis Date   • Essential hypertension, benign 12/21/2015   • Anxiety    • Arthritis     loulou spine   • Backpain    • Degenerative joint disease of spine    • Indigestion     GERD   • Psychiatric disorder     depression       FAMILY HISTORY  Family History   Problem Relation Age of Onset   • Hypertension Mother    • Hyperlipidemia Mother    • Other Father         psorosis of the liver       SOCIAL HISTORY  Social History     Socioeconomic History   • Marital status:    Tobacco Use   • Smoking status: Current Some Day Smoker     Types: Cigars   • Smokeless tobacco: Never Used   • Tobacco comment: 1-2 cigars per month   Vaping Use   • Vaping Use: Never used   Substance and Sexual Activity   • Alcohol use: Yes     Alcohol/week: 6.0 oz     Types: 10 Cans of beer per week     Comment: every other day   • Drug use: No   • Sexual activity: Yes       SURGICAL HISTORY  Past Surgical History:  "  Procedure Laterality Date   • CYSTOSCOPY STENT PLACEMENT N/A 11/18/2018    Procedure: CYSTOSCOPY STENT PLACEMENT;  Surgeon: Jai Law M.D.;  Location: SURGERY [facility];  Service: Urology   • URETEROSCOPY Right 11/18/2018    Procedure: URETEROSCOPY;  Surgeon: Jai Law M.D.;  Location: [facility];  Service: Urology   • LITHOTRIPSY N/A 11/18/2018    Procedure: LITHOTRIPSY;  Surgeon: Jai Law M.D.;  Location: SURGERY [facility];  Service: Urology   • OTHER ORTHOPEDIC SURGERY      L little finger fx repair       CURRENT MEDICATIONS  No current facility-administered medications on file prior to encounter.     Current Outpatient Medications on File Prior to Encounter   Medication Sig Dispense Refill   • atorvastatin (LIPITOR) 40 MG Tab Take 1 Tablet by mouth at bedtime. 90 Tablet 3   • lisinopril (PRINIVIL) 10 MG Tab Take 1 Tablet by mouth every day. 90 Tablet 3   • ibuprofen (MOTRIN) 800 MG Tab Take 800 mg by mouth every 8 hours as needed.     • aspirin EC (ECOTRIN) 81 MG Tablet Delayed Response Take 81 mg by mouth every day. (Patient not taking: No sig reported)     • Multiple Vitamins-Minerals (CENTRUM SILVER 50+MEN PO) Take 1 Tab by mouth every morning.     • zolpidem (AMBIEN) 10 MG Tab Take 10 mg by mouth at bedtime as needed for Sleep.     • FLUoxetine (PROZAC) 10 MG Cap Take 40 mg by mouth every day. Take 4  Capsules every morning     • omeprazole (PRILOSEC) 20 MG CPDR Take 20 mg by mouth every day.         ALLERGIES  Allergies   Allergen Reactions   • Penicillamine    • Penicillins    • Acetaminophen Itching     Other reaction(s): depression  Other reaction(s): depression     • Oxycodone Itching     Other reaction(s): depression  Other reaction(s): depression         PHYSICAL EXAM  VITAL SIGNS: /94   Pulse 60   Temp 36.6 °C (97.8 °F) (Temporal)   Resp 18   Ht 1.727 m (5' 8\")   Wt 82.3 kg (181 lb 7 oz)   SpO2 98%   BMI 27.59 kg/m²   "     Constitutional: Well developed, Well nourished, No acute distress, Non-toxic appearance.   HENT: 2 cm laceration of the formation slight abrasion.  Negative Homans negative cords no pedal edema  Eyes: Extraocular muscles intact.  Anicteric sclera    Thorax & Lungs: No respiratory distress no stridor  Abdomen: Bowel sounds normal, Soft, No tenderness, No masses, No pulsatile masses.   Skin: Patient has 2 cm laceration to be like formation top of his head  Neurologic: GCS of 15  Psychiatric: Affect normal, Judgment normal, Mood normal.       RADIOLOGY/PROCEDURES  Laceration Repair Procedure Note    Indication: Laceration    Procedure: The patient was placed in the appropriate position and anesthesia around the laceration was obtained by infiltration using 1% Lidocaine with epinephrine. The area was then irrigated with normal saline. The wound was explored and no foreign body/bodies was/were found. The laceration was closed with 5-0 plain gut. There were no additional lacerations requiring repair. The wound area was then dressed with bacitracin.      Total repaired wound length: 2.5 cm.     Other Items: Suture count: 6    The patient tolerated the procedure well.    Complications: None            COURSE & MEDICAL DECISION MAKING  Pertinent Labs & Imaging studies reviewed. (See chart for details)  Head laceration.  Irrigated.  No foreign body noted.  Serious head injury low.  Suture repaired.  Anticipatory guidance given.  Told return if symptoms worsen such as redness swelling discharge.    FINAL IMPRESSION  1.  2.5 cm laceration head with simple repair  2.   3.      Electronically signed by: Adolph Mg M.D., 4/27/2022 2:32 PM

## 2022-04-27 NOTE — ED TRIAGE NOTES
Pt comes in  Was dropped off by wife  Was working in he's yard today and came up under a tree hitting his head  Cut to top of head  Denies LOC of pain  Is on no bld thinners per pt

## 2022-09-20 ENCOUNTER — APPOINTMENT (OUTPATIENT)
Dept: RADIOLOGY | Facility: MEDICAL CENTER | Age: 72
End: 2022-09-20
Attending: EMERGENCY MEDICINE
Payer: MEDICARE

## 2022-09-20 ENCOUNTER — HOSPITAL ENCOUNTER (EMERGENCY)
Facility: MEDICAL CENTER | Age: 72
End: 2022-09-20
Attending: EMERGENCY MEDICINE
Payer: MEDICARE

## 2022-09-20 VITALS
DIASTOLIC BLOOD PRESSURE: 92 MMHG | HEIGHT: 68 IN | BODY MASS INDEX: 26.36 KG/M2 | RESPIRATION RATE: 18 BRPM | TEMPERATURE: 98.3 F | HEART RATE: 65 BPM | OXYGEN SATURATION: 99 % | WEIGHT: 173.94 LBS | SYSTOLIC BLOOD PRESSURE: 164 MMHG

## 2022-09-20 DIAGNOSIS — M54.50 ACUTE LEFT-SIDED LOW BACK PAIN WITHOUT SCIATICA: ICD-10-CM

## 2022-09-20 DIAGNOSIS — S39.012A STRAIN OF LUMBAR REGION, INITIAL ENCOUNTER: ICD-10-CM

## 2022-09-20 LAB
ANION GAP SERPL CALC-SCNC: 11 MMOL/L (ref 7–16)
APPEARANCE UR: CLEAR
BASOPHILS # BLD AUTO: 0.7 % (ref 0–1.8)
BASOPHILS # BLD: 0.05 K/UL (ref 0–0.12)
BILIRUB UR QL STRIP.AUTO: NEGATIVE
BUN SERPL-MCNC: 16 MG/DL (ref 8–22)
CALCIUM SERPL-MCNC: 9.2 MG/DL (ref 8.4–10.2)
CHLORIDE SERPL-SCNC: 102 MMOL/L (ref 96–112)
CO2 SERPL-SCNC: 23 MMOL/L (ref 20–33)
COLOR UR: YELLOW
CREAT SERPL-MCNC: 0.9 MG/DL (ref 0.5–1.4)
EOSINOPHIL # BLD AUTO: 0.01 K/UL (ref 0–0.51)
EOSINOPHIL NFR BLD: 0.1 % (ref 0–6.9)
ERYTHROCYTE [DISTWIDTH] IN BLOOD BY AUTOMATED COUNT: 41.2 FL (ref 35.9–50)
GFR SERPLBLD CREATININE-BSD FMLA CKD-EPI: 90 ML/MIN/1.73 M 2
GLUCOSE SERPL-MCNC: 138 MG/DL (ref 65–99)
GLUCOSE UR STRIP.AUTO-MCNC: NEGATIVE MG/DL
HCT VFR BLD AUTO: 37 % (ref 42–52)
HGB BLD-MCNC: 12.7 G/DL (ref 14–18)
IMM GRANULOCYTES # BLD AUTO: 0.02 K/UL (ref 0–0.11)
IMM GRANULOCYTES NFR BLD AUTO: 0.3 % (ref 0–0.9)
KETONES UR STRIP.AUTO-MCNC: NEGATIVE MG/DL
LEUKOCYTE ESTERASE UR QL STRIP.AUTO: NEGATIVE
LYMPHOCYTES # BLD AUTO: 1.35 K/UL (ref 1–4.8)
LYMPHOCYTES NFR BLD: 19.4 % (ref 22–41)
MCH RBC QN AUTO: 29.4 PG (ref 27–33)
MCHC RBC AUTO-ENTMCNC: 34.3 G/DL (ref 33.7–35.3)
MCV RBC AUTO: 85.6 FL (ref 81.4–97.8)
MICRO URNS: NORMAL
MONOCYTES # BLD AUTO: 0.27 K/UL (ref 0–0.85)
MONOCYTES NFR BLD AUTO: 3.9 % (ref 0–13.4)
NEUTROPHILS # BLD AUTO: 5.27 K/UL (ref 1.82–7.42)
NEUTROPHILS NFR BLD: 75.6 % (ref 44–72)
NITRITE UR QL STRIP.AUTO: NEGATIVE
NRBC # BLD AUTO: 0 K/UL
NRBC BLD-RTO: 0 /100 WBC
PH UR STRIP.AUTO: 7 [PH] (ref 5–8)
PLATELET # BLD AUTO: 341 K/UL (ref 164–446)
PMV BLD AUTO: 8.6 FL (ref 9–12.9)
POTASSIUM SERPL-SCNC: 3.8 MMOL/L (ref 3.6–5.5)
PROT UR QL STRIP: NEGATIVE MG/DL
RBC # BLD AUTO: 4.32 M/UL (ref 4.7–6.1)
RBC UR QL AUTO: NEGATIVE
SODIUM SERPL-SCNC: 136 MMOL/L (ref 135–145)
SP GR UR STRIP.AUTO: 1.01
WBC # BLD AUTO: 7 K/UL (ref 4.8–10.8)

## 2022-09-20 PROCEDURE — 700111 HCHG RX REV CODE 636 W/ 250 OVERRIDE (IP): Performed by: EMERGENCY MEDICINE

## 2022-09-20 PROCEDURE — 80048 BASIC METABOLIC PNL TOTAL CA: CPT

## 2022-09-20 PROCEDURE — A9270 NON-COVERED ITEM OR SERVICE: HCPCS | Performed by: EMERGENCY MEDICINE

## 2022-09-20 PROCEDURE — 96374 THER/PROPH/DIAG INJ IV PUSH: CPT

## 2022-09-20 PROCEDURE — 700102 HCHG RX REV CODE 250 W/ 637 OVERRIDE(OP): Performed by: EMERGENCY MEDICINE

## 2022-09-20 PROCEDURE — 99284 EMERGENCY DEPT VISIT MOD MDM: CPT

## 2022-09-20 PROCEDURE — 74176 CT ABD & PELVIS W/O CONTRAST: CPT

## 2022-09-20 PROCEDURE — 85025 COMPLETE CBC W/AUTO DIFF WBC: CPT

## 2022-09-20 PROCEDURE — 81003 URINALYSIS AUTO W/O SCOPE: CPT

## 2022-09-20 PROCEDURE — 36415 COLL VENOUS BLD VENIPUNCTURE: CPT

## 2022-09-20 RX ORDER — HYDROCODONE BITARTRATE AND ACETAMINOPHEN 5; 325 MG/1; MG/1
1 TABLET ORAL EVERY 8 HOURS PRN
Qty: 9 TABLET | Refills: 0 | Status: SHIPPED | OUTPATIENT
Start: 2022-09-20 | End: 2022-09-23

## 2022-09-20 RX ORDER — HYDROCODONE BITARTRATE AND ACETAMINOPHEN 5; 325 MG/1; MG/1
1 TABLET ORAL ONCE
Status: COMPLETED | OUTPATIENT
Start: 2022-09-20 | End: 2022-09-20

## 2022-09-20 RX ORDER — CYCLOBENZAPRINE HCL 10 MG
10 TABLET ORAL ONCE
Status: COMPLETED | OUTPATIENT
Start: 2022-09-20 | End: 2022-09-20

## 2022-09-20 RX ORDER — MORPHINE SULFATE 4 MG/ML
4 INJECTION INTRAVENOUS ONCE
Status: COMPLETED | OUTPATIENT
Start: 2022-09-20 | End: 2022-09-20

## 2022-09-20 RX ORDER — PREDNISONE 20 MG/1
20 TABLET ORAL DAILY
Qty: 4 TABLET | Refills: 0 | Status: SHIPPED | OUTPATIENT
Start: 2022-09-20 | End: 2022-09-24

## 2022-09-20 RX ORDER — PREDNISONE 10 MG/1
40 TABLET ORAL ONCE
Status: COMPLETED | OUTPATIENT
Start: 2022-09-20 | End: 2022-09-20

## 2022-09-20 RX ORDER — CYCLOBENZAPRINE HCL 10 MG
10 TABLET ORAL 3 TIMES DAILY PRN
Qty: 30 TABLET | Refills: 0 | Status: SHIPPED | OUTPATIENT
Start: 2022-09-20 | End: 2022-09-23

## 2022-09-20 RX ADMIN — HYDROCODONE BITARTRATE AND ACETAMINOPHEN 1 TABLET: 5; 325 TABLET ORAL at 07:58

## 2022-09-20 RX ADMIN — PREDNISONE 40 MG: 10 TABLET ORAL at 07:58

## 2022-09-20 RX ADMIN — MORPHINE SULFATE 4 MG: 4 INJECTION, SOLUTION INTRAMUSCULAR; INTRAVENOUS at 06:36

## 2022-09-20 RX ADMIN — CYCLOBENZAPRINE 10 MG: 10 TABLET, FILM COATED ORAL at 07:58

## 2022-09-20 ASSESSMENT — ENCOUNTER SYMPTOMS
HEADACHES: 0
FLANK PAIN: 1
ABDOMINAL PAIN: 0
VOMITING: 1
BACK PAIN: 1
NAUSEA: 1
SHORTNESS OF BREATH: 0
COUGH: 0
FALLS: 0
FEVER: 0

## 2022-09-20 NOTE — ED PROVIDER NOTES
ED Provider Note    ED Provider Note    Primary care provider: Tennille Sampson M.D.  Means of arrival: POV  History obtained from: patient  History limited by: None    CHIEF COMPLAINT  Chief Complaint   Patient presents with    N/V    Low Back Pain     LLQ pain        HPI  Nelson Jiménez is a 71 y.o. male who presents to the Emergency Department with a chief complaint of left back pain.  Patient states on Saturday, he was doing some gardening which is not out of the ordinary for him and he thought that he may have strained his back which she has done in the past.  He states he golfs about 3 times per week he is always twisting and bending and occasionally he will strain his back.  He applied some heat and took some ibuprofen and Tylenol which is typically how he manages this type of symptom but symptoms have been persistent and worsened, this morning prompting his ED visit.  He reports the last time that he had similar type pain he was here in the emergency department and was diagnosed with a kidney stone which ultimately required what he describes as laser lithotripsy.  Despite triage note, patient denies ever having any blood in his urine now or in the last few days.  He denies dysuria.  No change in his symptoms with urination.  He has had nausea and vomiting this morning.  No fever.  No cough or cold symptoms.  No recent trauma or falls.  No chest pain or shortness of breath.    REVIEW OF SYSTEMS  Review of Systems   Constitutional:  Negative for fever.   HENT:  Negative for congestion.    Respiratory:  Negative for cough and shortness of breath.    Cardiovascular:  Negative for chest pain.   Gastrointestinal:  Positive for nausea and vomiting. Negative for abdominal pain.   Genitourinary:  Positive for flank pain. Negative for dysuria and hematuria.   Musculoskeletal:  Positive for back pain. Negative for falls.   Neurological:  Negative for headaches.   All other systems reviewed and are  negative.    PAST MEDICAL HISTORY   has a past medical history of Anxiety, Arthritis, Backpain, Degenerative joint disease of spine, Essential hypertension, benign (12/21/2015), Indigestion, and Psychiatric disorder.    SURGICAL HISTORY   has a past surgical history that includes other orthopedic surgery; cystoscopy stent placement (N/A, 11/18/2018); ureteroscopy (Right, 11/18/2018); and lithotripsy (N/A, 11/18/2018).    SOCIAL HISTORY  Social History     Tobacco Use    Smoking status: Some Days     Types: Cigars    Smokeless tobacco: Never    Tobacco comments:     1-2 cigars per month   Vaping Use    Vaping Use: Never used   Substance Use Topics    Alcohol use: Yes     Alcohol/week: 6.0 oz     Types: 10 Cans of beer per week     Comment: every other day    Drug use: No      Social History     Substance and Sexual Activity   Drug Use No       FAMILY HISTORY  Family History   Problem Relation Age of Onset    Hypertension Mother     Hyperlipidemia Mother     Other Father         psorosis of the liver       CURRENT MEDICATIONS  Home Medications       Reviewed by Judy Beebe R.N. (Registered Nurse) on 09/20/22 at 0556  Med List Status: Not Addressed     Medication Last Dose Status   aspirin EC (ECOTRIN) 81 MG Tablet Delayed Response  Active   atorvastatin (LIPITOR) 40 MG Tab  Active   FLUoxetine (PROZAC) 10 MG Cap  Active   ibuprofen (MOTRIN) 800 MG Tab  Active   lisinopril (PRINIVIL) 10 MG Tab  Active   Multiple Vitamins-Minerals (CENTRUM SILVER 50+MEN PO)  Active   omeprazole (PRILOSEC) 20 MG CPDR  Active   zolpidem (AMBIEN) 10 MG Tab  Active                    ALLERGIES  Allergies   Allergen Reactions    Penicillamine     Penicillins     Acetaminophen Itching     Other reaction(s): depression  Other reaction(s): depression      Oxycodone Itching     Other reaction(s): depression  Other reaction(s): depression         PHYSICAL EXAM  VITAL SIGNS: BP (!) 164/92   Pulse 65   Temp 36.8 °C (98.3 °F)  "(Temporal)   Resp 18   Ht 1.727 m (5' 8\")   Wt 78.9 kg (173 lb 15.1 oz)   SpO2 99%   BMI 26.45 kg/m²   Vitals reviewed.  Constitutional: Patient is oriented to person, place, and time. Appears well-developed and well-nourished. Mild-moderate distress.    Head: Normocephalic and atraumatic.   Mouth/Throat: Oropharynx is clear and moist.   Eyes: Conjunctivae are normal. Pupils are equal, round, and reactive to light.   Neck: Normal range of motion. Neck supple.  Cardiovascular: Normal rate, regular rhythm and normal heart sounds. Normal peripheral pulses.  Pulmonary/Chest: Effort normal and breath sounds normal. No respiratory distress, no wheezes, rhonchi, or rales.   Abdominal: Soft. Bowel sounds are normal. There is no tenderness. No rebound or guarding, or peritoneal signs. Left CVA /flank tenderness.  Musculoskeletal: No edema and no tenderness.   Neurological: No focal deficits.   Skin: Skin is warm and dry. No erythema. No pallor.   Psychiatric: Patient has a normal mood and affect.     LABS  Results for orders placed or performed during the hospital encounter of 09/20/22   CBC WITH DIFFERENTIAL   Result Value Ref Range    WBC 7.0 4.8 - 10.8 K/uL    RBC 4.32 (L) 4.70 - 6.10 M/uL    Hemoglobin 12.7 (L) 14.0 - 18.0 g/dL    Hematocrit 37.0 (L) 42.0 - 52.0 %    MCV 85.6 81.4 - 97.8 fL    MCH 29.4 27.0 - 33.0 pg    MCHC 34.3 33.7 - 35.3 g/dL    RDW 41.2 35.9 - 50.0 fL    Platelet Count 341 164 - 446 K/uL    MPV 8.6 (L) 9.0 - 12.9 fL    Neutrophils-Polys 75.60 (H) 44.00 - 72.00 %    Lymphocytes 19.40 (L) 22.00 - 41.00 %    Monocytes 3.90 0.00 - 13.40 %    Eosinophils 0.10 0.00 - 6.90 %    Basophils 0.70 0.00 - 1.80 %    Immature Granulocytes 0.30 0.00 - 0.90 %    Nucleated RBC 0.00 /100 WBC    Neutrophils (Absolute) 5.27 1.82 - 7.42 K/uL    Lymphs (Absolute) 1.35 1.00 - 4.80 K/uL    Monos (Absolute) 0.27 0.00 - 0.85 K/uL    Eos (Absolute) 0.01 0.00 - 0.51 K/uL    Baso (Absolute) 0.05 0.00 - 0.12 K/uL    Immature " Granulocytes (abs) 0.02 0.00 - 0.11 K/uL    NRBC (Absolute) 0.00 K/uL   Basic Metabolic Panel   Result Value Ref Range    Sodium 136 135 - 145 mmol/L    Potassium 3.8 3.6 - 5.5 mmol/L    Chloride 102 96 - 112 mmol/L    Co2 23 20 - 33 mmol/L    Glucose 138 (H) 65 - 99 mg/dL    Bun 16 8 - 22 mg/dL    Creatinine 0.90 0.50 - 1.40 mg/dL    Calcium 9.2 8.4 - 10.2 mg/dL    Anion Gap 11.0 7.0 - 16.0   URINALYSIS    Specimen: Urine, Clean Catch   Result Value Ref Range    Color Yellow     Character Clear     Specific Gravity 1.010 <1.035    Ph 7.0 5.0 - 8.0    Glucose Negative Negative mg/dL    Ketones Negative Negative mg/dL    Protein Negative Negative mg/dL    Bilirubin Negative Negative    Nitrite Negative Negative    Leukocyte Esterase Negative Negative    Occult Blood Negative Negative    Micro Urine Req see below    ESTIMATED GFR   Result Value Ref Range    GFR (CKD-EPI) 90 >60 mL/min/1.73 m 2       All labs reviewed by me.    RADIOLOGY  CT-RENAL COLIC EVALUATION(A/P W/O)   Final Result      1.  Possible mild sigmoid diverticulitis   2.  No evidence of urolithiasis or other causes of obstructive uropathy        The radiologist's interpretation of all radiological studies have been reviewed by me.    COURSE & MEDICAL DECISION MAKING  Pertinent Labs & Imaging studies reviewed. (See chart for details)    Obtained and reviewed past medical records.  Patient's last encounter was in April of this year he was seen in the emergency department for head laceration.  Prior to that he was seen in April of this year in the outpatient setting, cardiology office, with Dr. Zabala for follow-up.  History of dyslipidemia, arthritis, back pain, and the RUCHI hypertension depression GERD.     5:57 AM - Patient seen and examined at bedside.  This is a pleasant 71-year-old male.  He is quite active.  He has a history of kidney stones.  He presents with left sided low back pain, flank pain.  He is not experiencing any abdominal pain.  He did  "have some nausea vomiting this morning which she describes as \"dry heaves\".  He denies a fever.  And again, despite triage note when asked about having any blood in his urine he states he has not noticed any over the last several days.  He is requesting pain medication.  Reports an allergy to penicillin.  He states there is a noted allergy to oxycodone in his chart but states that this is not real.  He was prescribed Percocet for a back surgery many years ago and it caused depression so he stopped taking it.  She will be kept NPO.  Have ordered CT imaging for further evaluation.  Urine is at the bedside and appears clear without any visual evidence of blood.  Of ordered urine analysis as well as CBC and chemistry.    7:50 AM patient is reevaluated at the bedside.  As I enter the room, patient is resting and appears comfortable.  He awakens upon my speaking.  He states he was dozing off.  Reports marked improvement in his pain although it is not completely resolved.  We discussed CT findings which show possible mild sigmoid diverticulitis although this is not consistent with patient's exam.  He is not having any abdominal pain.  No fever.  He has had no vomiting here in the department.  Labs are overall reassuring.  Urine analysis was normal.  Chemistries unrevealing.  Normal white blood cell count.  This point, I suspect this is likely musculoskeletal.  He did and he does state that on Saturday, when pain initially started, he was moving some heavy sod, which may have been the culprit.  He is definitely moving with greater ease now.  Ambulating here in the department.  He has no neurologic deficits.  We discussed ongoing therapies at home including a course of steroids, muscle relaxer as well as a short course of Norco.  His wife will pick him up.  He is given strict return precautions.  He is advised to avoid activities that cause pain but gentle range of motion exercises, walking if not causing pain are " encouraged.  He is already canceled his golf game today because of rain.  He is well-appearing and nontoxic.  He is discharged home in stable condition.    FINAL IMPRESSION  1. Strain of lumbar region, initial encounter    2. Acute left-sided low back pain without sciatica

## 2022-09-20 NOTE — ED TRIAGE NOTES
C/o left lower back pain non radiating reports possible blood in urine stated 2-3days ago worse today +n/-v

## 2022-09-20 NOTE — ED NOTES
Patient is stable for discharge at this time, anticipatory guidance provided, close follow-up is encouraged, and ED return instructions have been detailed. Patient is both agreeable to the disposition and plan and discharged home in ambulatory state and in good condition.     Rx education provided, Pt verbalized understanding;     Narcotic paper signed;    Pt stated that his spouse is driving him home;

## 2022-11-08 ENCOUNTER — PATIENT MESSAGE (OUTPATIENT)
Dept: HEALTH INFORMATION MANAGEMENT | Facility: OTHER | Age: 72
End: 2022-11-08

## 2023-02-27 ENCOUNTER — APPOINTMENT (RX ONLY)
Dept: URBAN - METROPOLITAN AREA CLINIC 35 | Facility: CLINIC | Age: 73
Setting detail: DERMATOLOGY
End: 2023-02-27

## 2023-02-27 DIAGNOSIS — L81.4 OTHER MELANIN HYPERPIGMENTATION: ICD-10-CM

## 2023-02-27 DIAGNOSIS — D17 BENIGN LIPOMATOUS NEOPLASM: ICD-10-CM

## 2023-02-27 DIAGNOSIS — Z87.2 PERSONAL HISTORY OF DISEASES OF THE SKIN AND SUBCUTANEOUS TISSUE: ICD-10-CM

## 2023-02-27 DIAGNOSIS — D22 MELANOCYTIC NEVI: ICD-10-CM

## 2023-02-27 DIAGNOSIS — D18.0 HEMANGIOMA: ICD-10-CM

## 2023-02-27 DIAGNOSIS — L82.1 OTHER SEBORRHEIC KERATOSIS: ICD-10-CM

## 2023-02-27 DIAGNOSIS — Z71.89 OTHER SPECIFIED COUNSELING: ICD-10-CM

## 2023-02-27 PROBLEM — D18.01 HEMANGIOMA OF SKIN AND SUBCUTANEOUS TISSUE: Status: ACTIVE | Noted: 2023-02-27

## 2023-02-27 PROBLEM — D17.21 BENIGN LIPOMATOUS NEOPLASM OF SKIN AND SUBCUTANEOUS TISSUE OF RIGHT ARM: Status: ACTIVE | Noted: 2023-02-27

## 2023-02-27 PROBLEM — D22.5 MELANOCYTIC NEVI OF TRUNK: Status: ACTIVE | Noted: 2023-02-27

## 2023-02-27 PROCEDURE — ? SUNSCREEN TREATMENT REGIMEN

## 2023-02-27 PROCEDURE — ? OBSERVATION AND MEASURE

## 2023-02-27 PROCEDURE — 99213 OFFICE O/P EST LOW 20 MIN: CPT

## 2023-02-27 PROCEDURE — ? COUNSELING

## 2023-02-27 ASSESSMENT — LOCATION SIMPLE DESCRIPTION DERM
LOCATION SIMPLE: ABDOMEN
LOCATION SIMPLE: LEFT UPPER ARM
LOCATION SIMPLE: RIGHT HAND
LOCATION SIMPLE: LEFT FOREHEAD
LOCATION SIMPLE: LEFT UPPER ARM
LOCATION SIMPLE: UPPER BACK
LOCATION SIMPLE: CHEST
LOCATION SIMPLE: LEFT HAND
LOCATION SIMPLE: RIGHT FOREARM

## 2023-02-27 ASSESSMENT — LOCATION DETAILED DESCRIPTION DERM
LOCATION DETAILED: LEFT ANTERIOR DISTAL UPPER ARM
LOCATION DETAILED: LEFT ANTERIOR DISTAL UPPER ARM
LOCATION DETAILED: RIGHT RADIAL DORSAL HAND
LOCATION DETAILED: LEFT ULNAR DORSAL HAND
LOCATION DETAILED: LEFT INFERIOR FOREHEAD
LOCATION DETAILED: RIGHT PROXIMAL ULNAR DORSAL FOREARM
LOCATION DETAILED: EPIGASTRIC SKIN
LOCATION DETAILED: STERNUM
LOCATION DETAILED: SUPERIOR THORACIC SPINE

## 2023-02-27 ASSESSMENT — LOCATION ZONE DERM
LOCATION ZONE: FACE
LOCATION ZONE: HAND
LOCATION ZONE: ARM
LOCATION ZONE: ARM
LOCATION ZONE: TRUNK

## 2023-05-14 ENCOUNTER — HOSPITAL ENCOUNTER (EMERGENCY)
Facility: MEDICAL CENTER | Age: 73
End: 2023-05-14
Attending: EMERGENCY MEDICINE
Payer: MEDICARE

## 2023-05-14 VITALS
OXYGEN SATURATION: 98 % | TEMPERATURE: 98 F | WEIGHT: 168.21 LBS | RESPIRATION RATE: 16 BRPM | DIASTOLIC BLOOD PRESSURE: 94 MMHG | HEART RATE: 89 BPM | BODY MASS INDEX: 25.49 KG/M2 | SYSTOLIC BLOOD PRESSURE: 154 MMHG | HEIGHT: 68 IN

## 2023-05-14 DIAGNOSIS — A08.4 VIRAL GASTROENTERITIS: ICD-10-CM

## 2023-05-14 LAB
ALBUMIN SERPL BCP-MCNC: 4.2 G/DL (ref 3.2–4.9)
ALBUMIN/GLOB SERPL: 1.4 G/DL
ALP SERPL-CCNC: 141 U/L (ref 30–99)
ALT SERPL-CCNC: 17 U/L (ref 2–50)
ANION GAP SERPL CALC-SCNC: 14 MMOL/L (ref 7–16)
APPEARANCE UR: CLEAR
AST SERPL-CCNC: 22 U/L (ref 12–45)
BASOPHILS # BLD AUTO: 0.4 % (ref 0–1.8)
BASOPHILS # BLD: 0.03 K/UL (ref 0–0.12)
BILIRUB SERPL-MCNC: 0.6 MG/DL (ref 0.1–1.5)
BILIRUB UR QL STRIP.AUTO: NEGATIVE
BUN SERPL-MCNC: 22 MG/DL (ref 8–22)
CALCIUM ALBUM COR SERPL-MCNC: 9.1 MG/DL (ref 8.5–10.5)
CALCIUM SERPL-MCNC: 9.3 MG/DL (ref 8.4–10.2)
CHLORIDE SERPL-SCNC: 104 MMOL/L (ref 96–112)
CO2 SERPL-SCNC: 20 MMOL/L (ref 20–33)
COLOR UR: YELLOW
CREAT SERPL-MCNC: 1.03 MG/DL (ref 0.5–1.4)
EOSINOPHIL # BLD AUTO: 0 K/UL (ref 0–0.51)
EOSINOPHIL NFR BLD: 0 % (ref 0–6.9)
ERYTHROCYTE [DISTWIDTH] IN BLOOD BY AUTOMATED COUNT: 42.1 FL (ref 35.9–50)
GFR SERPLBLD CREATININE-BSD FMLA CKD-EPI: 76 ML/MIN/1.73 M 2
GLOBULIN SER CALC-MCNC: 3 G/DL (ref 1.9–3.5)
GLUCOSE SERPL-MCNC: 164 MG/DL (ref 65–99)
GLUCOSE UR STRIP.AUTO-MCNC: NEGATIVE MG/DL
HCT VFR BLD AUTO: 40.2 % (ref 42–52)
HGB BLD-MCNC: 13.7 G/DL (ref 14–18)
IMM GRANULOCYTES # BLD AUTO: 0.02 K/UL (ref 0–0.11)
IMM GRANULOCYTES NFR BLD AUTO: 0.3 % (ref 0–0.9)
KETONES UR STRIP.AUTO-MCNC: ABNORMAL MG/DL
LEUKOCYTE ESTERASE UR QL STRIP.AUTO: NEGATIVE
LIPASE SERPL-CCNC: 22 U/L (ref 7–58)
LYMPHOCYTES # BLD AUTO: 0.28 K/UL (ref 1–4.8)
LYMPHOCYTES NFR BLD: 3.6 % (ref 22–41)
MCH RBC QN AUTO: 29 PG (ref 27–33)
MCHC RBC AUTO-ENTMCNC: 34.1 G/DL (ref 33.7–35.3)
MCV RBC AUTO: 85.2 FL (ref 81.4–97.8)
MICRO URNS: ABNORMAL
MONOCYTES # BLD AUTO: 0.21 K/UL (ref 0–0.85)
MONOCYTES NFR BLD AUTO: 2.7 % (ref 0–13.4)
NEUTROPHILS # BLD AUTO: 7.22 K/UL (ref 1.82–7.42)
NEUTROPHILS NFR BLD: 93 % (ref 44–72)
NITRITE UR QL STRIP.AUTO: NEGATIVE
NRBC # BLD AUTO: 0 K/UL
NRBC BLD-RTO: 0 /100 WBC
PH UR STRIP.AUTO: 7.5 [PH] (ref 5–8)
PLATELET # BLD AUTO: 366 K/UL (ref 164–446)
PMV BLD AUTO: 9.2 FL (ref 9–12.9)
POTASSIUM SERPL-SCNC: 4 MMOL/L (ref 3.6–5.5)
PROT SERPL-MCNC: 7.2 G/DL (ref 6–8.2)
PROT UR QL STRIP: NEGATIVE MG/DL
RBC # BLD AUTO: 4.72 M/UL (ref 4.7–6.1)
RBC UR QL AUTO: NEGATIVE
SODIUM SERPL-SCNC: 138 MMOL/L (ref 135–145)
SP GR UR STRIP.AUTO: 1.01
WBC # BLD AUTO: 7.8 K/UL (ref 4.8–10.8)

## 2023-05-14 PROCEDURE — 96375 TX/PRO/DX INJ NEW DRUG ADDON: CPT

## 2023-05-14 PROCEDURE — 81003 URINALYSIS AUTO W/O SCOPE: CPT

## 2023-05-14 PROCEDURE — 99285 EMERGENCY DEPT VISIT HI MDM: CPT

## 2023-05-14 PROCEDURE — 80053 COMPREHEN METABOLIC PANEL: CPT

## 2023-05-14 PROCEDURE — 96374 THER/PROPH/DIAG INJ IV PUSH: CPT

## 2023-05-14 PROCEDURE — 700111 HCHG RX REV CODE 636 W/ 250 OVERRIDE (IP)

## 2023-05-14 PROCEDURE — 36415 COLL VENOUS BLD VENIPUNCTURE: CPT

## 2023-05-14 PROCEDURE — 96376 TX/PRO/DX INJ SAME DRUG ADON: CPT

## 2023-05-14 PROCEDURE — 85025 COMPLETE CBC W/AUTO DIFF WBC: CPT

## 2023-05-14 PROCEDURE — 700105 HCHG RX REV CODE 258: Performed by: EMERGENCY MEDICINE

## 2023-05-14 PROCEDURE — 700111 HCHG RX REV CODE 636 W/ 250 OVERRIDE (IP): Performed by: EMERGENCY MEDICINE

## 2023-05-14 PROCEDURE — 83690 ASSAY OF LIPASE: CPT

## 2023-05-14 RX ORDER — SODIUM CHLORIDE 9 MG/ML
1000 INJECTION, SOLUTION INTRAVENOUS ONCE
Status: COMPLETED | OUTPATIENT
Start: 2023-05-14 | End: 2023-05-14

## 2023-05-14 RX ORDER — ONDANSETRON 2 MG/ML
4 INJECTION INTRAMUSCULAR; INTRAVENOUS ONCE
Status: COMPLETED | OUTPATIENT
Start: 2023-05-14 | End: 2023-05-14

## 2023-05-14 RX ORDER — ONDANSETRON 4 MG/1
4 TABLET, ORALLY DISINTEGRATING ORAL EVERY 6 HOURS PRN
Qty: 10 TABLET | Refills: 0 | Status: SHIPPED | OUTPATIENT
Start: 2023-05-14

## 2023-05-14 RX ADMIN — ONDANSETRON 4 MG: 2 INJECTION INTRAMUSCULAR; INTRAVENOUS at 10:17

## 2023-05-14 RX ADMIN — SODIUM CHLORIDE 1000 ML: 9 INJECTION, SOLUTION INTRAVENOUS at 10:05

## 2023-05-14 RX ADMIN — ONDANSETRON 4 MG: 2 INJECTION INTRAMUSCULAR; INTRAVENOUS at 09:15

## 2023-05-14 RX ADMIN — FENTANYL CITRATE 25 MCG: 50 INJECTION, SOLUTION INTRAMUSCULAR; INTRAVENOUS at 10:05

## 2023-05-14 ASSESSMENT — FIBROSIS 4 INDEX: FIB4 SCORE: 0.86

## 2023-05-14 NOTE — ED TRIAGE NOTES
Pt presents with wife from home for abd pain, HA, and body aches/weakness. Sudden onset around 2100 last night. Pt A&Ox4. Pt required wheelchair to patient room.

## 2023-05-14 NOTE — ED PROVIDER NOTES
ED Provider Note    CHIEF COMPLAINT  Chief Complaint   Patient presents with    Abdominal Pain    Weakness         HPI/ROS    Nelson Omar Jiménez is a 72 y.o. male who presents with abdominal pain.  The patient's been sick since this morning.  He states that he was at a party last night and had a couple beers and some wine.  He states subsequently when he got home he started having nausea and vomiting.  He also has cramping abdominal pain, generalized weakness, and diffuse myalgias.  He has had some diarrhea.  He is unaware of any sick contacts.    PAST MEDICAL HISTORY   has a past medical history of Anxiety, Arthritis, Backpain, Degenerative joint disease of spine, Essential hypertension, benign (12/21/2015), Indigestion, and Psychiatric disorder.    SURGICAL HISTORY   has a past surgical history that includes other orthopedic surgery; cystoscopy stent placement (N/A, 11/18/2018); ureteroscopy (Right, 11/18/2018); and lithotripsy (N/A, 11/18/2018).    FAMILY HISTORY  Family History   Problem Relation Age of Onset    Hypertension Mother     Hyperlipidemia Mother     Other Father         psorosis of the liver       SOCIAL HISTORY  Social History     Tobacco Use    Smoking status: Some Days     Types: Cigars    Smokeless tobacco: Never    Tobacco comments:     1-2 cigars per month   Vaping Use    Vaping Use: Never used   Substance and Sexual Activity    Alcohol use: Yes     Alcohol/week: 6.0 oz     Types: 10 Cans of beer per week     Comment: every other day    Drug use: No    Sexual activity: Yes       CURRENT MEDICATIONS  Home Medications       Reviewed by Nunu Aleman R.N. (Registered Nurse) on 05/14/23 at 0903  Med List Status: Not Addressed     Medication Last Dose Status   aspirin EC (ECOTRIN) 81 MG Tablet Delayed Response  Active   atorvastatin (LIPITOR) 40 MG Tab  Active   FLUoxetine (PROZAC) 10 MG Cap  Active   ibuprofen (MOTRIN) 800 MG Tab  Active   lisinopril (PRINIVIL) 10 MG Tab  Active   Multiple  "Vitamins-Minerals (CENTRUM SILVER 50+MEN PO)  Active   omeprazole (PRILOSEC) 20 MG CPDR  Active   zolpidem (AMBIEN) 10 MG Tab  Active                    ALLERGIES  Allergies   Allergen Reactions    Penicillamine     Penicillins     Acetaminophen Itching     Other reaction(s): depression  Other reaction(s): depression      Oxycodone Itching     Other reaction(s): depression  Other reaction(s): depression         PHYSICAL EXAM  VITAL SIGNS: /84   Pulse 89   Temp 36.7 °C (98 °F) (Temporal)   Resp 16   Ht 1.727 m (5' 8\")   Wt 76.3 kg (168 lb 3.4 oz)   SpO2 99%   BMI 25.58 kg/m²    In general the patient appears ill but nontoxic    HEENT unremarkable    Pulmonary chest clear to auscultation bilaterally    Cardiovascular S1-S2 with a regular rate and rhythm    GI abdomen is soft with no focal tenderness and hyperactive bowel sounds    Skin no pallor no jaundice    Extremities no edema    Neurologic examination is grossly intact    DIAGNOSTIC STUDIES   Results for orders placed or performed during the hospital encounter of 05/14/23   CBC WITH DIFFERENTIAL   Result Value Ref Range    WBC 7.8 4.8 - 10.8 K/uL    RBC 4.72 4.70 - 6.10 M/uL    Hemoglobin 13.7 (L) 14.0 - 18.0 g/dL    Hematocrit 40.2 (L) 42.0 - 52.0 %    MCV 85.2 81.4 - 97.8 fL    MCH 29.0 27.0 - 33.0 pg    MCHC 34.1 33.7 - 35.3 g/dL    RDW 42.1 35.9 - 50.0 fL    Platelet Count 366 164 - 446 K/uL    MPV 9.2 9.0 - 12.9 fL    Neutrophils-Polys 93.00 (H) 44.00 - 72.00 %    Lymphocytes 3.60 (L) 22.00 - 41.00 %    Monocytes 2.70 0.00 - 13.40 %    Eosinophils 0.00 0.00 - 6.90 %    Basophils 0.40 0.00 - 1.80 %    Immature Granulocytes 0.30 0.00 - 0.90 %    Nucleated RBC 0.00 /100 WBC    Neutrophils (Absolute) 7.22 1.82 - 7.42 K/uL    Lymphs (Absolute) 0.28 (L) 1.00 - 4.80 K/uL    Monos (Absolute) 0.21 0.00 - 0.85 K/uL    Eos (Absolute) 0.00 0.00 - 0.51 K/uL    Baso (Absolute) 0.03 0.00 - 0.12 K/uL    Immature Granulocytes (abs) 0.02 0.00 - 0.11 K/uL    NRBC " (Absolute) 0.00 K/uL   COMP METABOLIC PANEL   Result Value Ref Range    Sodium 138 135 - 145 mmol/L    Potassium 4.0 3.6 - 5.5 mmol/L    Chloride 104 96 - 112 mmol/L    Co2 20 20 - 33 mmol/L    Anion Gap 14.0 7.0 - 16.0    Glucose 164 (H) 65 - 99 mg/dL    Bun 22 8 - 22 mg/dL    Creatinine 1.03 0.50 - 1.40 mg/dL    Calcium 9.3 8.4 - 10.2 mg/dL    AST(SGOT) 22 12 - 45 U/L    ALT(SGPT) 17 2 - 50 U/L    Alkaline Phosphatase 141 (H) 30 - 99 U/L    Total Bilirubin 0.6 0.1 - 1.5 mg/dL    Albumin 4.2 3.2 - 4.9 g/dL    Total Protein 7.2 6.0 - 8.2 g/dL    Globulin 3.0 1.9 - 3.5 g/dL    A-G Ratio 1.4 g/dL   LIPASE   Result Value Ref Range    Lipase 22 7 - 58 U/L   URINALYSIS    Specimen: Urine   Result Value Ref Range    Micro Urine Req see below    CORRECTED CALCIUM   Result Value Ref Range    Correct Calcium 9.1 8.5 - 10.5 mg/dL   ESTIMATED GFR   Result Value Ref Range    GFR (CKD-EPI) 76 >60 mL/min/1.73 m 2       COURSE & MEDICAL DECISION MAKING    This a 72-year-old gentleman who presents emerged part with abdominal discomfort.  The patient does not have any focal tenderness.  Based on his myalgias as well as multiple system involvement I suspect this is from a viral gastroenteritis.  Furthermore he has diarrhea.  Laboratory analysis is reassuring as the patient is not acidotic.  He does have a little bit of a left shift but I suspect is demargination from the current viral process.  The patient did respond to the 25 mcg of fentanyl, Zofran, and subsequently is tolerated an oral challenge.  The patient will be discharged home with instructions to stay well-hydrated.  He will receive a prescription for Zofran.  The patient will return if he is acutely worse or does not have significant improvement in 3 to 5 days.    FINAL DIAGNOSIS  1.  Abdominal pain  2.  Vomiting and diarrhea  3.  Generalized weakness and myalgias  4.  Suspect viral gastroenteritis    Disposition  The patient will be discharged in stable condition        Electronically signed by: Jake Ac M.D., 5/14/2023 9:55 AM    The patient is aware his blood pressure is elevated this needs to be followed up in 10 to 14 days.

## 2023-05-14 NOTE — ED NOTES
Assisted w/ discharge.  Reviewed discharge instructions and prescription x 1 sent to selected Pharmacy w/ pt, verbalized understanding to information provided including follow up care w/ PCP, return precautions and medications, denied questions/concerns.  [facility] waiting for spouse for ride home.

## 2023-05-14 NOTE — ED NOTES
Patient returned to [facility] and placed back on monitor. Patient reports continued nausea that is worse when sitting up. See MAR. Urine collected and sent to lab.

## 2023-06-23 ENCOUNTER — TELEPHONE (OUTPATIENT)
Dept: CARDIOLOGY | Facility: MEDICAL CENTER | Age: 73
End: 2023-06-23

## 2023-06-23 NOTE — TELEPHONE ENCOUNTER
CW    Caller: Nelson Jiménez    Topic/issue: Above PT called to make an apt with CW but he was unavailable- scheduled with Justine Hernandez. PT expressed having high blood pressure in the 150 range/ 80.  PT also mentioned his PCP increased some of his meds and nothing has helped.      Callback Number: [phone]    Thank You  Yareli DUCKWORTH

## 2023-06-26 ENCOUNTER — OFFICE VISIT (OUTPATIENT)
Dept: CARDIOLOGY | Facility: MEDICAL CENTER | Age: 73
End: 2023-06-26
Attending: PHYSICIAN ASSISTANT
Payer: MEDICARE

## 2023-06-26 VITALS
DIASTOLIC BLOOD PRESSURE: 87 MMHG | BODY MASS INDEX: 25.61 KG/M2 | HEIGHT: 68 IN | SYSTOLIC BLOOD PRESSURE: 146 MMHG | HEART RATE: 71 BPM | OXYGEN SATURATION: 98 % | WEIGHT: 169 LBS

## 2023-06-26 DIAGNOSIS — R93.1 AGATSTON CAC SCORE, >400: ICD-10-CM

## 2023-06-26 DIAGNOSIS — R73.03 PREDIABETES: ICD-10-CM

## 2023-06-26 DIAGNOSIS — E78.5 DYSLIPIDEMIA: ICD-10-CM

## 2023-06-26 DIAGNOSIS — I10 ESSENTIAL HYPERTENSION, BENIGN: ICD-10-CM

## 2023-06-26 PROCEDURE — 99214 OFFICE O/P EST MOD 30 MIN: CPT | Performed by: PHYSICIAN ASSISTANT

## 2023-06-26 PROCEDURE — 99212 OFFICE O/P EST SF 10 MIN: CPT | Performed by: PHYSICIAN ASSISTANT

## 2023-06-26 PROCEDURE — 3077F SYST BP >= 140 MM HG: CPT | Performed by: PHYSICIAN ASSISTANT

## 2023-06-26 PROCEDURE — 3079F DIAST BP 80-89 MM HG: CPT | Performed by: PHYSICIAN ASSISTANT

## 2023-06-26 RX ORDER — LISINOPRIL 40 MG/1
40 TABLET ORAL DAILY
Qty: 90 TABLET | Refills: 3 | Status: SHIPPED
Start: 2023-06-26 | End: 2023-06-26

## 2023-06-26 RX ORDER — AMLODIPINE BESYLATE 5 MG/1
5 TABLET ORAL DAILY
Qty: 30 TABLET | Refills: 2 | Status: SHIPPED
Start: 2023-06-26

## 2023-06-26 RX ORDER — LISINOPRIL 40 MG/1
40 TABLET ORAL DAILY
Qty: 90 TABLET | Refills: 3 | Status: SHIPPED | OUTPATIENT
Start: 2023-06-26 | End: 2023-06-26

## 2023-06-26 RX ORDER — AMLODIPINE BESYLATE 5 MG/1
5 TABLET ORAL DAILY
Qty: 30 TABLET | Refills: 2 | Status: SHIPPED | OUTPATIENT
Start: 2023-06-26 | End: 2023-06-26

## 2023-06-26 RX ORDER — AMLODIPINE BESYLATE 5 MG/1
5 TABLET ORAL DAILY
Qty: 30 TABLET | Refills: 2 | Status: SHIPPED
Start: 2023-06-26 | End: 2023-06-26

## 2023-06-26 RX ORDER — LISINOPRIL 40 MG/1
40 TABLET ORAL DAILY
Qty: 90 TABLET | Refills: 3 | Status: SHIPPED
Start: 2023-06-26

## 2023-06-26 ASSESSMENT — ENCOUNTER SYMPTOMS
WEAKNESS: 0
SHORTNESS OF BREATH: 0
PND: 0
COUGH: 0
DIZZINESS: 1
FEVER: 0
DEPRESSION: 0
NAUSEA: 0
SEIZURES: 0
SHORTNESS OF BREATH: 1
ORTHOPNEA: 0
PALPITATIONS: 0
WEIGHT LOSS: 0
CLAUDICATION: 0
CHILLS: 0
HEADACHES: 1
DIZZINESS: 0
LOSS OF CONSCIOUSNESS: 0
BLURRED VISION: 0

## 2023-06-26 ASSESSMENT — FIBROSIS 4 INDEX: FIB4 SCORE: 1.08

## 2023-06-26 NOTE — PROGRESS NOTES
Cardiology Progress Note, Student    Date of Service  6/26/2023      Attending: Justine Hernandez P.a.-c.      Primary Care Physician  Tennille Sampson M.D.      Chief Complaint  Chief Complaint   Patient presents with    Hypertension    Dyslipidemia       History of Presenting Illness (HPI):   Nelson Jiménez is a 72 y.o. male with PMH of essential HTN, dyslipidemia, prediabetes who presents with worsening elevated home blood pressure readings. He has been taking Lisinopril 10mg for the past 2 years and was recently instructed to increase does to 30mg by his PCP, Dr. Sampson. Patients home readings -170 and DBP 70-90. He states that last year he was very active, cycling and hiking for about 1 hour 4 times per week, however since the winter he has only been playing [employer] ball and currently golfing every other day. When he goes golfing he will take 800mg IBU for his joint aches. Patient also reports ongoing worsening headaches[facility]ra[facility] He states that he will occasionally experience chest tightness and anxiety when he takes his BP at home. He experiences SOB and dizziness associated with walking up stairs. He states that he will smoke a cigar and drink a couple beers about 4x/week. Patient denies fever, weight loss, vision changes, palpitations, orthopnea, claudication, edema of the lower extremities, LOC, weakness, seizures.    Review of Systems  Review of Systems   Constitutional:  Negative for chills, fever, malaise/fatigue and weight loss.   Eyes:  Negative for blurred vision.   Respiratory:  Positive for shortness of breath.    Cardiovascular:  Positive for chest pain. Negative for palpitations, orthopnea, claudication, leg swelling and PND.   Musculoskeletal:  Positive for joint pain.   Neurological:  Positive for dizziness and headaches. Negative for seizures, loss of consciousness and weakness.       Past Medical History   has a past medical history  of Anxiety, Arthritis, Backpain, Degenerative joint disease of spine, Essential hypertension, benign (12/21/2015), Indigestion, and Psychiatric disorder.    Surgical History   has a past surgical history that includes other orthopedic surgery; cystoscopy stent placement (N/A, 11/18/2018); ureteroscopy (Right, 11/18/2018); and lithotripsy (N/A, 11/18/2018).     Family History  family history includes Hyperlipidemia in his mother; Hypertension in his mother; Other in his father.   Family history reviewed with patient.     Social History  Tobacco: pt reports that he will occasionally smoke cigars, about 4x/week  Alcohol: occasionally  Recreational drugs (illegal or prescription): none      Allergies  Allergies   Allergen Reactions    Penicillamine Hives and Itching    Penicillins Hives and Itching    Acetaminophen Itching     Other reaction(s): depression    Oxycodone Itching     Other reaction(s): depression       Medications  Current Outpatient Medications on File Prior to Visit   Medication Sig Dispense Refill    Aspirin-Acetaminophen-Caffeine (EXCEDRIN PO) Take 1-2 Tablets by mouth 2 times a day as needed (For migraines).      ondansetron (ZOFRAN ODT) 4 MG TABLET DISPERSIBLE Take 1 Tablet by mouth every 6 hours as needed for Nausea/Vomiting. 10 Tablet 0    atorvastatin (LIPITOR) 40 MG Tab Take 1 Tablet by mouth at bedtime. 90 Tablet 3    Multiple Vitamins-Minerals (CENTRUM SILVER 50+MEN PO) Take 1 Tab by mouth every morning.      zolpidem (AMBIEN) 10 MG Tab Take 10 mg by mouth at bedtime.      FLUoxetine (PROZAC) 10 MG Cap Take 40 mg by mouth every day. Take 4  Capsules every morning      omeprazole (PRILOSEC) 20 MG CPDR Take 20 mg by mouth every day.       No current facility-administered medications on file prior to visit.     Physical Exam  Pulse:  [71] 71  BP: (132-146)/(70-87) 146/87  SpO2:  [98 %] 98 %  Blood Pressure : (!) 146/87 (PT BP Cuff)       Pulse: 71       Pulse Oximetry: 98 %       Physical  Exam  Constitutional:       General: He is not in acute distress.     Appearance: Normal appearance. He is normal weight.   HENT:      Head: Normocephalic and atraumatic.   Cardiovascular:      Rate and Rhythm: Normal rate and regular rhythm.      Pulses: Normal pulses.           Posterior tibial pulses are 2+ on the right side and 2+ on the left side.      Heart sounds: Normal heart sounds. No murmur heard.     No friction rub. No gallop.   Pulmonary:      Effort: Pulmonary effort is normal. No respiratory distress.      Breath sounds: Normal breath sounds. No stridor. No wheezing, rhonchi or rales.   Musculoskeletal:         General: No swelling. Normal range of motion.      Right lower leg: No edema.      Left lower leg: No edema.   Skin:     General: Skin is warm and dry.      Capillary Refill: Capillary refill takes less than 2 seconds.   Neurological:      General: No focal deficit present.      Mental Status: He is alert and oriented to person, place, and time.   Psychiatric:         Mood and Affect: Mood normal.         Laboratory:          No results for input(s): ALTSGPT, ASTSGOT, ALKPHOSPHAT, TBILIRUBIN, DBILIRUBIN, GAMMAGT, AMYLASE, LIPASE, ALB, PREALBUMIN, GLUCOSE in the last 72 hours.      No results for input(s): NTPROBNP in the last 72 hours.      No results for input(s): TROPONINT in the last 72 hours.    Imaging:  No orders to display       Assessment/Plan:    Essential HTN  -Instructed patient to increase lisinopril to 40mg to be taken at night.  -Start Amlodipine 5mg to be taken in the morning.  -Will order BMP to evaluate for hyperkalemia.  -Instructed the patient to start exercising again, decrease his salt intake, drink plenty of water.  -Discontinue IBU as this may be contributing to his HTN  -Discontinue smoking cigars    2. Prediabetes  -Pt will continue low carb diet and work on increasing exercise.    3. Dyslipidemia  -will order fasting lipid panel  -instructed pt to continue  statin        RAGHU Landis-S2

## 2023-06-26 NOTE — PATIENT INSTRUCTIONS
Try to minimize the Ibuprofen use as much as possible as this can increase your blood pressure    Increase lisinopril to 40mg, take in the evening    Start Amlodipine 5mg in the morning    Get your fasting labs drawn in 2-3 weeks

## 2023-06-26 NOTE — PROGRESS NOTES
Chief Complaint   Patient presents with    Hypertension    Dyslipidemia          Subjective:   Nelson Jiménez is a 72 y.o. male who presents today for follow-up.    Patient of Dr. Zabala, last seen Sept 2020.  Current medical problems include coronary calcium score of >400, dyslipidemia.    Nelson returns the office today to discuss elevated blood pressure readings.  His primary care has been increasing his lisinopril but he continues to have high readings at home.  Generally in the 140s-170s systolic.  When he gets these high readings he tends to feel anxious and has generalized pressure in his chest.  He also complains of more frequent headaches.    Nelson describes less physical activity in the past year or so.  He continues to golf 3 times a week however uses the golf cart.  He does not have a regular exercise routine.    He takes 800 mg of ibuprofen at least 3 times per week.  He smokes cigars 3-4 times per week.  He does not follow any regular diet.         Past Medical History:   Diagnosis Date    Anxiety     Arthritis     loulou spine    Backpain     Degenerative joint disease of spine     Essential hypertension, benign 12/21/2015    Indigestion     GERD    Psychiatric disorder     depression         Past Surgical History:   Procedure Laterality Date    CYSTOSCOPY STENT PLACEMENT N/A 11/18/2018    Procedure: CYSTOSCOPY STENT PLACEMENT;  Surgeon: Jai Law M.D.;  Location: [facility];  Service: Urology    URETEROSCOPY Right 11/18/2018    Procedure: URETEROSCOPY;  Surgeon: Jai Law M.D.;  Location: [facility];  Service: Urology    LITHOTRIPSY N/A 11/18/2018    Procedure: LITHOTRIPSY;  Surgeon: Jai Law M.D.;  Location: [facility];  Service: Urology    OTHER ORTHOPEDIC SURGERY      L little finger fx repair         Family History   Problem Relation Age of Onset    Hypertension Mother     Hyperlipidemia Mother     Other Father         psorosis  of the liver         Social History     Tobacco Use   Smoking Status Some Days    Types: Cigars   Smokeless Tobacco Never   Tobacco Comments    1-2 cigars per month   Vaping Use    Vaping Use: Never used          Social History     Substance and Sexual Activity   Alcohol Use Yes    Alcohol/week: 6.0 oz    Types: 10 Cans of beer per week    Comment: every other day         Allergies   Allergen Reactions    Penicillamine Hives and Itching    Penicillins Hives and Itching    Acetaminophen Itching     Other reaction(s): depression    Oxycodone Itching     Other reaction(s): depression         Outpatient Encounter Medications as of 6/26/2023   Medication Sig Dispense Refill    amLODIPine (NORVASC) 5 MG Tab Take 1 Tablet by mouth every day. 30 Tablet 2    lisinopril (PRINIVIL) 40 MG tablet Take 1 Tablet by mouth every day. 90 Tablet 3    Aspirin-Acetaminophen-Caffeine (EXCEDRIN PO) Take 1-2 Tablets by mouth 2 times a day as needed (For migraines).      ondansetron (ZOFRAN ODT) 4 MG TABLET DISPERSIBLE Take 1 Tablet by mouth every 6 hours as needed for Nausea/Vomiting. 10 Tablet 0    atorvastatin (LIPITOR) 40 MG Tab Take 1 Tablet by mouth at bedtime. 90 Tablet 3    Multiple Vitamins-Minerals (CENTRUM SILVER 50+MEN PO) Take 1 Tab by mouth every morning.      zolpidem (AMBIEN) 10 MG Tab Take 10 mg by mouth at bedtime.      FLUoxetine (PROZAC) 10 MG Cap Take 40 mg by mouth every day. Take 4  Capsules every morning      omeprazole (PRILOSEC) 20 MG CPDR Take 20 mg by mouth every day.      [DISCONTINUED] lisinopril (PRINIVIL) 40 MG tablet Take 1 Tablet by mouth every day. 90 Tablet 3    [DISCONTINUED] amLODIPine (NORVASC) 5 MG Tab Take 1 Tablet by mouth every day. 30 Tablet 2    [DISCONTINUED] amLODIPine (NORVASC) 5 MG Tab Take 1 Tablet by mouth every day. 30 Tablet 2    [DISCONTINUED] lisinopril (PRINIVIL) 40 MG tablet Take 1 Tablet by mouth every day. 90 Tablet 3    [DISCONTINUED] lisinopril (PRINIVIL) 10 MG Tab Take 1 Tablet  "by mouth every day. (Patient taking differently: Take 30 mg by mouth every day.) 90 Tablet 3    [DISCONTINUED] Ibuprofen 200 MG Cap Take 600 mg by mouth every 8 hours as needed for Moderate Pain.       No facility-administered encounter medications on file as of 6/26/2023.         Review of Systems   Constitutional:  Negative for chills and fever.        No unexpected weight changes   Respiratory:  Negative for cough and shortness of breath.    Cardiovascular:  Negative for chest pain, palpitations, orthopnea, leg swelling and PND.   Gastrointestinal:  Negative for nausea.   Neurological:  Positive for headaches. Negative for dizziness.   Psychiatric/Behavioral:  Negative for depression.           Objective:   BP (!) 146/87 (BP Location: Left arm, Patient Position: Sitting, BP Cuff Size: Adult)   Pulse 71   Ht 1.727 m (5' 8\")   Wt 76.7 kg (169 lb)   SpO2 98%   BMI 25.70 kg/m²        Physical Exam  Vitals reviewed.   Constitutional:       General: He is not in acute distress.  HENT:      Head: Normocephalic and atraumatic.   Eyes:      General: No scleral icterus.  Cardiovascular:      Rate and Rhythm: Normal rate and regular rhythm.      Heart sounds: No murmur heard.     Comments: Radial pulses 2+ bilaterally  PT pulses 2+ bilaterally    Pulmonary:      Effort: Pulmonary effort is normal. No respiratory distress.      Breath sounds: No rales.   Abdominal:      General: There is no distension.   Musculoskeletal:      Right lower leg: No edema.      Left lower leg: No edema.   Skin:     General: Skin is warm and dry.   Neurological:      General: No focal deficit present.      Mental Status: He is alert.      Gait: Gait normal.   Psychiatric:         Judgment: Judgment normal.            Assessment:     1. Prediabetes        2. Essential hypertension, benign  Basic Metabolic Panel    amLODIPine (NORVASC) 5 MG Tab    lisinopril (PRINIVIL) 40 MG tablet    DISCONTINUED: lisinopril (PRINIVIL) 40 MG tablet    " DISCONTINUED: amLODIPine (NORVASC) 5 MG Tab    DISCONTINUED: amLODIPine (NORVASC) 5 MG Tab    DISCONTINUED: lisinopril (PRINIVIL) 40 MG tablet      3. Dyslipidemia  Lipid Profile           Medical Decision Making:  Today's Assessment / Plan:   Hypertension, poorly controlled  -Increase lisinopril to 40 mg nightly.  Check BMP in 2 weeks to assess for hyperkalemia  -Start amlodipine 5 mg daily  -Monitor blood pressures at home.  Return in 3 weeks for nonprovider visit  -DASH diet  -Decrease ibuprofen use  -Decrease cigar use to avoid transient hypertension from nicotine  -Return in 3 months.    Coronary artery calcification  - cont statin, recheck lipid profile, if LDL>70 recommend increasing atorva to 80mg  - monitor for angina type symptoms    RTC in 3mo, sooner if needed

## 2023-07-17 ENCOUNTER — NON-PROVIDER VISIT (OUTPATIENT)
Dept: CARDIOLOGY | Facility: MEDICAL CENTER | Age: 73
End: 2023-07-17
Attending: PHYSICIAN ASSISTANT
Payer: MEDICARE

## 2023-07-17 ENCOUNTER — TELEPHONE (OUTPATIENT)
Dept: CARDIOLOGY | Facility: MEDICAL CENTER | Age: 73
End: 2023-07-17

## 2023-07-17 VITALS — SYSTOLIC BLOOD PRESSURE: 110 MMHG | DIASTOLIC BLOOD PRESSURE: 67 MMHG

## 2023-07-17 PROCEDURE — 99999 PR NO CHARGE: CPT | Performed by: PHYSICIAN ASSISTANT

## 2023-07-17 NOTE — PROGRESS NOTES
Patient was here today for blood pressure check per JA.     Patient reports no active symptoms at this time but he did experience dizziness on Friday while out playing golf in the heat. Patient brought his home BP logs which have been scanned into Media.      BP readings located in vital sign section including patient's home BP cuff readings. Informed patient we will forward readings to nurse and they will receive a call with recommendations.

## 2023-07-17 NOTE — PROGRESS NOTES
Recent BP's since 7/6 appear normal (per BP log in media)    JA: Please advise if any further recommendation

## 2023-07-17 NOTE — TELEPHONE ENCOUNTER
----- Message from Juany Ferguson R.N. sent at 7/17/2023 12:12 PM PDT -----  To Judy: Can you please FU on ARIANA spencer for NP visit. Thank you!  ----- Message -----  From: Luba Unger, Med Ass't  Sent: 7/17/2023  10:29 AM PDT  To: Juany Ferguson R.N.

## 2023-07-18 ENCOUNTER — TELEPHONE (OUTPATIENT)
Dept: CARDIOLOGY | Facility: MEDICAL CENTER | Age: 73
End: 2023-07-18
Payer: MEDICARE

## 2023-07-18 NOTE — TELEPHONE ENCOUNTER
Phone Number Called: [phone]    Call outcome: Left detailed message for patient. Informed to call back with any additional questions.    Message: Called to notify of JA recommendations.

## 2023-07-18 NOTE — TELEPHONE ENCOUNTER
----- Message from Justine Hernandez P.A.-C. sent at 7/18/2023  1:54 PM PDT -----  Agree, BPs look good. Labs look good. Cont the current meds. thanks  ----- Message -----  From: Judy Mcgee R.N.  Sent: 7/17/2023  12:23 PM PDT  To: Justine Hernandez P.A.-C.

## 2023-09-22 NOTE — PROCEDURE: LIQUID NITROGEN
Call placed to patient after pt given biopsy results from radiologist, questions answered, support given, adv next step is to set patient up with surgeon, options discussed and pt would like to have appt made with Dr. Isabel Roman, adv patient that  would call her back by tomorrow with appt, pt states understanding, pt with no questions at this time, has name/# for any further needs
Disregard previous note pt will have appt
Medical Necessity Information: It is in your best interest to select a reason for this procedure from the list below. All of these items fulfill various CMS LCD requirements except the new and changing color options.
Render Post-Care Instructions In Note?: no
Detail Level: Detailed
Medical Necessity Clause: This procedure was medically necessary because the lesions that were treated were:
Consent: The patient's consent was obtained including but not limited to risks of crusting, scabbing, blistering, scarring, darker or lighter pigmentary change, recurrence, incomplete removal and infection.
Post-Care Instructions: I reviewed with the patient in detail post-care instructions. Patient is to wear sunprotection, and avoid picking at any of the treated lesions. Pt may apply Vaseline to crusted or scabbing areas.

## 2023-11-22 ENCOUNTER — OFFICE VISIT (OUTPATIENT)
Dept: CARDIOLOGY | Facility: MEDICAL CENTER | Age: 73
End: 2023-11-22
Attending: PHYSICIAN ASSISTANT
Payer: MEDICARE

## 2023-11-22 VITALS
WEIGHT: 172 LBS | OXYGEN SATURATION: 99 % | HEART RATE: 60 BPM | RESPIRATION RATE: 16 BRPM | SYSTOLIC BLOOD PRESSURE: 134 MMHG | HEIGHT: 68 IN | DIASTOLIC BLOOD PRESSURE: 82 MMHG | BODY MASS INDEX: 26.07 KG/M2

## 2023-11-22 DIAGNOSIS — R73.03 PREDIABETES: ICD-10-CM

## 2023-11-22 DIAGNOSIS — E78.5 DYSLIPIDEMIA: ICD-10-CM

## 2023-11-22 DIAGNOSIS — I10 ESSENTIAL HYPERTENSION, BENIGN: ICD-10-CM

## 2023-11-22 DIAGNOSIS — R93.1 AGATSTON CAC SCORE, >400: ICD-10-CM

## 2023-11-22 PROCEDURE — 3075F SYST BP GE 130 - 139MM HG: CPT | Performed by: PHYSICIAN ASSISTANT

## 2023-11-22 PROCEDURE — 3079F DIAST BP 80-89 MM HG: CPT | Performed by: PHYSICIAN ASSISTANT

## 2023-11-22 PROCEDURE — 99212 OFFICE O/P EST SF 10 MIN: CPT | Performed by: PHYSICIAN ASSISTANT

## 2023-11-22 PROCEDURE — 99214 OFFICE O/P EST MOD 30 MIN: CPT | Performed by: PHYSICIAN ASSISTANT

## 2023-11-22 ASSESSMENT — FIBROSIS 4 INDEX: FIB4 SCORE: 1.08

## 2023-11-22 ASSESSMENT — ENCOUNTER SYMPTOMS
COUGH: 0
ORTHOPNEA: 0
SHORTNESS OF BREATH: 0
BACK PAIN: 1
LOSS OF CONSCIOUSNESS: 0
PALPITATIONS: 0
DIZZINESS: 0
PND: 0
NAUSEA: 0

## 2023-11-22 NOTE — PROGRESS NOTES
Chief Complaint   Patient presents with    Dyslipidemia     Follow up          Subjective:   Nelson Jiménez is a 72 y.o. male who presents today for follow-up.    Patient of Dr. Zabala, last seen Sept 2020.  Current medical problems include coronary calcium score of >400, dyslipidemia.    Last visit June 2023, amlodipine was started for his high blood pressures.  He return for a blood pressure check 1 month later which showed well-controlled blood pressures.    Today his initial blood pressure in the office is 140/74, reduced to 134/82 at the end of the visit.  He has not been monitoring his blood pressures at home.  He continues on the amlodipine and lisinopril.  He has not been doing his normal walking due to back pain and generally feeling fatigued.  He continues to play golf 3 times a week but may have to stop this due to his back pain.  He had a lot of dental work this past month and was requiring high doses of ibuprofen for pain relief.  He is no longer needing the ibuprofen at this point.    He denies any exertional chest pressure, shortness of breath.       Past Medical History:   Diagnosis Date    Anxiety     Arthritis     loulou spine    Backpain     Degenerative joint disease of spine     Essential hypertension, benign 12/21/2015    Indigestion     GERD    Psychiatric disorder     depression         Family History   Problem Relation Age of Onset    Hypertension Mother     Hyperlipidemia Mother     Other Father         psorosis of the liver         Social History     Tobacco Use    Smoking status: Some Days     Types: Cigars    Smokeless tobacco: Never    Tobacco comments:     1-2 cigars per month   Vaping Use    Vaping Use: Never used   Substance Use Topics    Alcohol use: Yes     Alcohol/week: 6.0 oz     Types: 10 Cans of beer per week     Comment: every other day    Drug use: No         Allergies   Allergen Reactions    Penicillamine Hives and Itching    Penicillins Hives and Itching    Acetaminophen  "Itching     Other reaction(s): depression    Oxycodone Itching     Other reaction(s): depression         Current Outpatient Medications   Medication Sig    amLODIPine (NORVASC) 5 MG Tab Take 1 Tablet by mouth every day.    lisinopril (PRINIVIL) 40 MG tablet Take 1 Tablet by mouth every day.    Aspirin-Acetaminophen-Caffeine (EXCEDRIN PO) Take 1-2 Tablets by mouth 2 times a day as needed (For migraines).    ondansetron (ZOFRAN ODT) 4 MG TABLET DISPERSIBLE Take 1 Tablet by mouth every 6 hours as needed for Nausea/Vomiting.    atorvastatin (LIPITOR) 40 MG Tab Take 1 Tablet by mouth at bedtime.    Multiple Vitamins-Minerals (CENTRUM SILVER 50+MEN PO) Take 1 Tab by mouth every morning.    zolpidem (AMBIEN) 10 MG Tab Take 10 mg by mouth at bedtime.    FLUoxetine (PROZAC) 10 MG Cap Take 40 mg by mouth every day. Take 4  Capsules every morning    omeprazole (PRILOSEC) 20 MG CPDR Take 20 mg by mouth every day.         Review of Systems   Respiratory:  Negative for cough and shortness of breath.    Cardiovascular:  Negative for chest pain, palpitations, orthopnea, leg swelling and PND.   Gastrointestinal:  Negative for nausea.   Musculoskeletal:  Positive for back pain.   Neurological:  Negative for dizziness and loss of consciousness.          Objective:   /82 (BP Location: Left arm, Patient Position: Sitting)   Pulse 60   Resp 16   Ht 1.727 m (5' 8\")   Wt 78 kg (172 lb)   SpO2 99%  Body mass index is 26.15 kg/m².         Physical Exam  Vitals reviewed.   HENT:      Head: Normocephalic and atraumatic.   Cardiovascular:      Rate and Rhythm: Normal rate and regular rhythm.      Heart sounds: No murmur heard.  Pulmonary:      Effort: Pulmonary effort is normal. No respiratory distress.      Breath sounds: No rales.   Abdominal:      General: There is no distension.   Musculoskeletal:      Right lower leg: No edema.      Left lower leg: No edema.   Skin:     General: Skin is warm.   Neurological:      General: No " focal deficit present.      Mental Status: He is alert.      Gait: Gait normal.   Psychiatric:         Judgment: Judgment normal.            Assessment:     1. Essential hypertension, benign        2. Dyslipidemia        3. Agatston CAC score, >400        4. Prediabetes             Medical Decision Making:  Today's Assessment / Plan:   Hypertension  -Cont lisinopril to 40 mg nightly and amlodipine 5 mg daily  -Monitor blood pressures at home, send readings in 1 mo to ensure <130/80 consistently   -DASH diet  -Decrease ibuprofen use     Coronary artery calcification  - cont statin, recheck lipid profile in 2024, goal LDL <70  - monitor for angina type symptoms      Return in about 1 year (around 11/22/2024) for Dr Zabala.

## 2023-11-23 ENCOUNTER — PATIENT MESSAGE (OUTPATIENT)
Dept: CARDIOLOGY | Facility: MEDICAL CENTER | Age: 73
End: 2023-11-23
Payer: MEDICARE

## 2023-11-29 ENCOUNTER — PATIENT MESSAGE (OUTPATIENT)
Dept: HEALTH INFORMATION MANAGEMENT | Facility: OTHER | Age: 73
End: 2023-11-29

## 2024-02-26 ENCOUNTER — APPOINTMENT (RX ONLY)
Dept: URBAN - METROPOLITAN AREA CLINIC 35 | Facility: CLINIC | Age: 74
Setting detail: DERMATOLOGY
End: 2024-02-26

## 2024-02-26 DIAGNOSIS — Z71.89 OTHER SPECIFIED COUNSELING: ICD-10-CM

## 2024-02-26 DIAGNOSIS — Z87.2 PERSONAL HISTORY OF DISEASES OF THE SKIN AND SUBCUTANEOUS TISSUE: ICD-10-CM

## 2024-02-26 DIAGNOSIS — D22 MELANOCYTIC NEVI: ICD-10-CM

## 2024-02-26 DIAGNOSIS — D17 BENIGN LIPOMATOUS NEOPLASM: ICD-10-CM

## 2024-02-26 DIAGNOSIS — D18.0 HEMANGIOMA: ICD-10-CM

## 2024-02-26 DIAGNOSIS — L82.1 OTHER SEBORRHEIC KERATOSIS: ICD-10-CM

## 2024-02-26 DIAGNOSIS — L81.4 OTHER MELANIN HYPERPIGMENTATION: ICD-10-CM

## 2024-02-26 PROBLEM — D18.01 HEMANGIOMA OF SKIN AND SUBCUTANEOUS TISSUE: Status: ACTIVE | Noted: 2024-02-26

## 2024-02-26 PROBLEM — D17.21 BENIGN LIPOMATOUS NEOPLASM OF SKIN AND SUBCUTANEOUS TISSUE OF RIGHT ARM: Status: ACTIVE | Noted: 2024-02-26

## 2024-02-26 PROBLEM — D22.5 MELANOCYTIC NEVI OF TRUNK: Status: ACTIVE | Noted: 2024-02-26

## 2024-02-26 PROCEDURE — ? OBSERVATION AND MEASURE

## 2024-02-26 PROCEDURE — ? SUNSCREEN TREATMENT REGIMEN

## 2024-02-26 PROCEDURE — 99213 OFFICE O/P EST LOW 20 MIN: CPT

## 2024-02-26 PROCEDURE — ? COUNSELING

## 2024-02-26 ASSESSMENT — LOCATION DETAILED DESCRIPTION DERM
LOCATION DETAILED: EPIGASTRIC SKIN
LOCATION DETAILED: SUPERIOR THORACIC SPINE
LOCATION DETAILED: STERNUM
LOCATION DETAILED: LEFT INFERIOR FOREHEAD
LOCATION DETAILED: LEFT ULNAR DORSAL HAND
LOCATION DETAILED: RIGHT PROXIMAL ULNAR DORSAL FOREARM
LOCATION DETAILED: RIGHT RADIAL DORSAL HAND
LOCATION DETAILED: LEFT ANTERIOR DISTAL UPPER ARM
LOCATION DETAILED: LEFT ANTERIOR DISTAL UPPER ARM

## 2024-02-26 ASSESSMENT — LOCATION ZONE DERM
LOCATION ZONE: ARM
LOCATION ZONE: TRUNK
LOCATION ZONE: FACE
LOCATION ZONE: ARM
LOCATION ZONE: HAND

## 2024-02-26 ASSESSMENT — LOCATION SIMPLE DESCRIPTION DERM
LOCATION SIMPLE: RIGHT FOREARM
LOCATION SIMPLE: ABDOMEN
LOCATION SIMPLE: LEFT UPPER ARM
LOCATION SIMPLE: LEFT FOREHEAD
LOCATION SIMPLE: LEFT HAND
LOCATION SIMPLE: UPPER BACK
LOCATION SIMPLE: LEFT UPPER ARM
LOCATION SIMPLE: CHEST
LOCATION SIMPLE: RIGHT HAND

## 2024-12-02 ENCOUNTER — OFFICE VISIT (OUTPATIENT)
Dept: CARDIOLOGY | Facility: MEDICAL CENTER | Age: 74
End: 2024-12-02
Attending: INTERNAL MEDICINE
Payer: MEDICARE

## 2024-12-02 VITALS
WEIGHT: 169 LBS | DIASTOLIC BLOOD PRESSURE: 60 MMHG | OXYGEN SATURATION: 98 % | SYSTOLIC BLOOD PRESSURE: 120 MMHG | BODY MASS INDEX: 25.61 KG/M2 | RESPIRATION RATE: 16 BRPM | HEIGHT: 68 IN | HEART RATE: 74 BPM

## 2024-12-02 DIAGNOSIS — I70.0 ATHEROSCLEROSIS OF AORTA (HCC): ICD-10-CM

## 2024-12-02 DIAGNOSIS — E78.2 MIXED HYPERLIPIDEMIA: ICD-10-CM

## 2024-12-02 DIAGNOSIS — R93.1 AGATSTON CAC SCORE, >400: ICD-10-CM

## 2024-12-02 DIAGNOSIS — I10 ESSENTIAL HYPERTENSION, BENIGN: ICD-10-CM

## 2024-12-02 DIAGNOSIS — E78.5 DYSLIPIDEMIA: ICD-10-CM

## 2024-12-02 PROCEDURE — 99212 OFFICE O/P EST SF 10 MIN: CPT | Performed by: INTERNAL MEDICINE

## 2024-12-02 PROCEDURE — 3074F SYST BP LT 130 MM HG: CPT | Performed by: INTERNAL MEDICINE

## 2024-12-02 PROCEDURE — 99214 OFFICE O/P EST MOD 30 MIN: CPT | Performed by: INTERNAL MEDICINE

## 2024-12-02 PROCEDURE — 3078F DIAST BP <80 MM HG: CPT | Performed by: INTERNAL MEDICINE

## 2024-12-02 RX ORDER — LISINOPRIL 40 MG/1
40 TABLET ORAL DAILY
Qty: 90 TABLET | Refills: 3 | Status: SHIPPED | OUTPATIENT
Start: 2024-12-02

## 2024-12-02 RX ORDER — HYDROCHLOROTHIAZIDE 12.5 MG/1
6.25 TABLET ORAL
COMMUNITY
Start: 2024-09-27 | End: 2024-12-02 | Stop reason: SDUPTHER

## 2024-12-02 RX ORDER — HYDROCHLOROTHIAZIDE 12.5 MG/1
12.5 TABLET ORAL DAILY
Qty: 90 TABLET | Refills: 3 | Status: SHIPPED | OUTPATIENT
Start: 2024-12-02

## 2024-12-02 RX ORDER — ATORVASTATIN CALCIUM 40 MG/1
40 TABLET, FILM COATED ORAL
Qty: 90 TABLET | Refills: 3 | Status: SHIPPED | OUTPATIENT
Start: 2024-12-02

## 2024-12-02 ASSESSMENT — FIBROSIS 4 INDEX: FIB4 SCORE: 1.11

## 2024-12-02 NOTE — PROGRESS NOTES
Chief Complaint   Patient presents with    Dyslipidemia    Hypertension       Subjective     Nelson Jiménez is a 74 y.o. male who presents today for follow-up of his history of age-appropriate calcium score dyslipidemia hypertension     Had gingival hyperplasia with amlodipine so started HCTZ    Past Medical History:   Diagnosis Date    Anxiety     Arthritis     loulou spine    Backpain     Degenerative joint disease of spine     Essential hypertension, benign 12/21/2015    Indigestion     GERD    Psychiatric disorder     depression     Past Surgical History:   Procedure Laterality Date    CYSTOSCOPY STENT PLACEMENT N/A 11/18/2018    Procedure: CYSTOSCOPY STENT PLACEMENT;  Surgeon: Jai Law M.D.;  Location: [facility];  Service: Urology    URETEROSCOPY Right 11/18/2018    Procedure: URETEROSCOPY;  Surgeon: Jai Law M.D.;  Location: [facility];  Service: Urology    LITHOTRIPSY N/A 11/18/2018    Procedure: LITHOTRIPSY;  Surgeon: Jai Law M.D.;  [facility]: [facility];  Service: Urology    OTHER ORTHOPEDIC SURGERY      L little finger fx repair     Family History   Problem Relation Age of Onset    Hypertension Mother     Hyperlipidemia Mother     Other Father         psorosis of the liver     Social History     Socioeconomic History    Marital status:      Spouse name: Not on file    Number of children: Not on file    Years of education: Not on file    Highest education level: Not on file   Occupational History    Not on file   Tobacco Use    Smoking status: Some Days     Types: Cigars    Smokeless tobacco: Never    Tobacco comments:     1-2 cigars per month   Vaping Use    Vaping status: Never Used   Substance and Sexual Activity    Alcohol use: Yes     Alcohol/week: 6.0 oz     Types: 10 Cans of beer per week     Comment: every other day    Drug use: No    Sexual activity: Yes   Other Topics Concern    Not on file   Social History Narrative     "Not on file     Social Drivers of Health     Financial Resource Strain: Not on file   Food Insecurity: Not on file   Transportation Needs: Not on file   Physical Activity: Not on file   Stress: Not on file   Social Connections: Not on file   Intimate Partner Violence: Not on file   Housing Stability: Not on file     Allergies   Allergen Reactions    Penicillamine Hives and Itching    Penicillins Hives and Itching    Acetaminophen Itching     Other reaction(s): depression    Oxycodone Itching     Other reaction(s): depression     Outpatient Encounter Medications as of 12/2/2024   Medication Sig Dispense Refill    hydroCHLOROthiazide 12.5 MG tablet Take 6.25 mg by mouth.      lisinopril (PRINIVIL) 40 MG tablet Take 1 Tablet by mouth every day. 90 Tablet 3    Aspirin-Acetaminophen-Caffeine (EXCEDRIN PO) Take 1-2 Tablets by mouth 2 times a day as needed (For migraines).      atorvastatin (LIPITOR) 40 MG Tab Take 1 Tablet by mouth at bedtime. 90 Tablet 3    Multiple Vitamins-Minerals (CENTRUM SILVER 50+MEN PO) Take 1 Tab by mouth every morning.      zolpidem (AMBIEN) 10 MG Tab Take 10 mg by mouth at bedtime.      FLUoxetine (PROZAC) 10 MG Cap Take 40 mg by mouth every day. Take 4  Capsules every morning      omeprazole (PRILOSEC) 20 MG CPDR Take 20 mg by mouth every day.      [DISCONTINUED] amLODIPine (NORVASC) 5 MG Tab Take 1 Tablet by mouth every day. (Patient not taking: Reported on 12/2/2024) 30 Tablet 2    [DISCONTINUED] ondansetron (ZOFRAN ODT) 4 MG TABLET DISPERSIBLE Take 1 Tablet by mouth every 6 hours as needed for Nausea/Vomiting. (Patient not taking: Reported on 12/2/2024) 10 Tablet 0     No facility-administered encounter medications on file as of 12/2/2024.     ROS           Objective     /60 (BP Location: Left arm, Patient Position: Sitting, BP Cuff Size: Adult)   Pulse 74   Resp 16   Ht 1.727 m (5' 8\")   Wt 76.7 kg (169 lb)   SpO2 98%   BMI 25.70 kg/m²     Physical Exam  Constitutional:       " General: He is not in acute distress.     Appearance: He is not diaphoretic.   Eyes:      General: No scleral icterus.  Neck:      Vascular: No JVD.   Cardiovascular:      Rate and Rhythm: Normal rate.      Heart sounds: Normal heart sounds. No murmur heard.     No friction rub. No gallop.   Pulmonary:      Effort: No respiratory distress.      Breath sounds: No wheezing or rales.   Abdominal:      General: Bowel sounds are normal.      Palpations: Abdomen is soft.   Musculoskeletal:      Right lower leg: No edema.      Left lower leg: No edema.   Skin:     Findings: No rash.   Neurological:      Mental Status: He is alert. Mental status is at baseline.   Psychiatric:         Mood and Affect: Mood normal.                Assessment & Plan     1. Essential hypertension, benign  lisinopril (PRINIVIL) 40 MG tablet    hydroCHLOROthiazide 12.5 MG tablet      2. Agatston CAC score, >400        3. Dyslipidemia        4. Mixed hyperlipidemia  atorvastatin (LIPITOR) 40 MG Tab      5. Atherosclerosis of aorta (HCC)            Medical Decision Making: Today's Assessment/Status/Plan:          It was my pleasure to meet with Mr. Jiménez.    We addressed the management of hypertension at today's visit. Blood pressure is well controlled.  We specifically assessed the labs on hypertension treatment    We addressed the management of dyslipidemia at today's visit. He is on appropriate lipid lowering medication.    Low threshold to do stress test      I will see Mr. Jiménez back in 1 year time and encouraged him to follow up with us over the phone or electronically using my MyChart as issues arise.    It is my pleasure to participate in the care of Mr. Jiménez.  Please do not hesitate to contact me with questions or concerns.    Malik Zabala MD PhD FACC  Cardiologist [facility]    Please note that this dictation was created using voice recognition software. There may be errors I did not discover  before finalizing the note.

## 2025-02-26 ENCOUNTER — APPOINTMENT (OUTPATIENT)
Dept: URBAN - METROPOLITAN AREA CLINIC 35 | Facility: CLINIC | Age: 75
Setting detail: DERMATOLOGY
End: 2025-02-26

## 2025-02-26 DIAGNOSIS — L82.1 OTHER SEBORRHEIC KERATOSIS: ICD-10-CM

## 2025-02-26 DIAGNOSIS — L81.4 OTHER MELANIN HYPERPIGMENTATION: ICD-10-CM

## 2025-02-26 DIAGNOSIS — D22 MELANOCYTIC NEVI: ICD-10-CM

## 2025-02-26 DIAGNOSIS — D485 NEOPLASM OF UNCERTAIN BEHAVIOR OF SKIN: ICD-10-CM

## 2025-02-26 DIAGNOSIS — D18.0 HEMANGIOMA: ICD-10-CM

## 2025-02-26 DIAGNOSIS — D17 BENIGN LIPOMATOUS NEOPLASM: ICD-10-CM

## 2025-02-26 DIAGNOSIS — Z71.89 OTHER SPECIFIED COUNSELING: ICD-10-CM

## 2025-02-26 DIAGNOSIS — Z87.2 PERSONAL HISTORY OF DISEASES OF THE SKIN AND SUBCUTANEOUS TISSUE: ICD-10-CM

## 2025-02-26 PROBLEM — D22.5 MELANOCYTIC NEVI OF TRUNK: Status: ACTIVE | Noted: 2025-02-26

## 2025-02-26 PROBLEM — D17.21 BENIGN LIPOMATOUS NEOPLASM OF SKIN AND SUBCUTANEOUS TISSUE OF RIGHT ARM: Status: ACTIVE | Noted: 2025-02-26

## 2025-02-26 PROBLEM — D48.5 NEOPLASM OF UNCERTAIN BEHAVIOR OF SKIN: Status: ACTIVE | Noted: 2025-02-26

## 2025-02-26 PROBLEM — D18.01 HEMANGIOMA OF SKIN AND SUBCUTANEOUS TISSUE: Status: ACTIVE | Noted: 2025-02-26

## 2025-02-26 PROCEDURE — ? SUNSCREEN TREATMENT REGIMEN

## 2025-02-26 PROCEDURE — ? OBSERVATION

## 2025-02-26 PROCEDURE — ? COUNSELING

## 2025-02-26 PROCEDURE — 11104 PUNCH BX SKIN SINGLE LESION: CPT

## 2025-02-26 PROCEDURE — 99213 OFFICE O/P EST LOW 20 MIN: CPT | Mod: 25

## 2025-02-26 PROCEDURE — ? BIOPSY BY PUNCH METHOD

## 2025-02-26 ASSESSMENT — LOCATION SIMPLE DESCRIPTION DERM
LOCATION SIMPLE: LEFT TEMPLE
LOCATION SIMPLE: RIGHT HAND
LOCATION SIMPLE: UPPER BACK
LOCATION SIMPLE: LEFT HAND
LOCATION SIMPLE: CHEST
LOCATION SIMPLE: LEFT FOREHEAD
LOCATION SIMPLE: ABDOMEN
LOCATION SIMPLE: RIGHT FOREARM
LOCATION SIMPLE: LEFT UPPER ARM
LOCATION SIMPLE: LEFT UPPER ARM

## 2025-02-26 ASSESSMENT — LOCATION ZONE DERM
LOCATION ZONE: ARM
LOCATION ZONE: TRUNK
LOCATION ZONE: HAND
LOCATION ZONE: ARM
LOCATION ZONE: FACE

## 2025-02-26 ASSESSMENT — LOCATION DETAILED DESCRIPTION DERM
LOCATION DETAILED: LEFT ULNAR DORSAL HAND
LOCATION DETAILED: LEFT INFERIOR FOREHEAD
LOCATION DETAILED: LEFT ANTERIOR DISTAL UPPER ARM
LOCATION DETAILED: STERNUM
LOCATION DETAILED: LEFT ANTERIOR DISTAL UPPER ARM
LOCATION DETAILED: LEFT MEDIAL TEMPLE
LOCATION DETAILED: SUPERIOR THORACIC SPINE
LOCATION DETAILED: RIGHT RADIAL DORSAL HAND
LOCATION DETAILED: EPIGASTRIC SKIN
LOCATION DETAILED: RIGHT PROXIMAL ULNAR DORSAL FOREARM

## 2025-02-26 NOTE — PROCEDURE: SUNSCREEN TREATMENT REGIMEN
Detail Level: Zone
PROCEDURES:  Irrigation and debridement of perineum and groin 18-Oct-2024 12:25:40  Fermin Arciniega  Graft, skin, split-thickness, perineum or groin 18-Oct-2024 12:28:17  Fermin Arciniega

## 2025-02-26 NOTE — PROCEDURE: BIOPSY BY PUNCH METHOD

## 2025-03-05 ENCOUNTER — APPOINTMENT (OUTPATIENT)
Dept: URBAN - METROPOLITAN AREA CLINIC 35 | Facility: CLINIC | Age: 75
Setting detail: DERMATOLOGY
End: 2025-03-05

## 2025-03-05 PROBLEM — C44.319 BASAL CELL CARCINOMA OF SKIN OF OTHER PARTS OF FACE: Status: ACTIVE | Noted: 2025-03-05

## 2025-03-05 PROCEDURE — ? SUTURE REMOVAL (GLOBAL PERIOD)

## 2025-03-05 NOTE — PROCEDURE: SUTURE REMOVAL (GLOBAL PERIOD)
Detail Level: Detailed
Add 07322 Cpt? (Important Note: In 2017 The Use Of 27020 Is Being Tracked By Cms To Determine Future Global Period Reimbursement For Global Periods): no
Suture Removal Completed By (Optional): Irene BUI MA

## 2025-04-04 ENCOUNTER — APPOINTMENT (OUTPATIENT)
Dept: URBAN - METROPOLITAN AREA CLINIC 36 | Facility: CLINIC | Age: 75
Setting detail: DERMATOLOGY
End: 2025-04-04

## 2025-04-04 PROBLEM — C44.319 BASAL CELL CARCINOMA OF SKIN OF OTHER PARTS OF FACE: Status: ACTIVE | Noted: 2025-04-04

## 2025-04-04 PROCEDURE — 17311 MOHS 1 STAGE H/N/HF/G: CPT

## 2025-04-04 PROCEDURE — 13132 CMPLX RPR F/C/C/M/N/AX/G/H/F: CPT

## 2025-04-04 PROCEDURE — 17312 MOHS ADDL STAGE: CPT

## 2025-04-04 PROCEDURE — ? MOHS SURGERY

## 2025-04-04 NOTE — PROCEDURE: MOHS SURGERY
Special Stains Stage 11 - Results: Base On Clearance Noted Above
Asc Procedure Text (F): After obtaining clear surgical margins the patient was sent to an ASC for surgical repair.  The patient understands they will receive post-surgical care and follow-up from the ASC physician.
Provider Procedure Text (F): After obtaining clear surgical margins the defect was repaired by another provider.
Anesthesia Type: 1% lidocaine with epinephrine and a 1:10 solution of 8.4% sodium bicarbonate
Simple / Intermediate / Complex Repair - Final Wound Length In Cm: 4.5
Burow's Graft Text: The defect edges were debeveled with a #15 scalpel blade.  Given the location of the defect, shape of the defect, the proximity to free margins and the presence of a standing cone deformity a Burow's skin graft was deemed most appropriate. The standing cone was removed and this tissue was then trimmed to the shape of the primary defect. The adipose tissue was also removed until only dermis and epidermis were left.  The skin margins of the secondary defect were undermined to an appropriate distance in all directions utilizing iris scissors.  The secondary defect was closed with interrupted buried subcutaneous sutures.  The skin edges were then re-apposed with running  sutures.  The skin graft was then placed in the primary defect and oriented appropriately.
Include Anticoagulation In Mohs Note?: No
Melolabial Interpolation Flap Text: A decision was made to reconstruct the defect utilizing an interpolation axial flap and a staged reconstruction.  A telfa template was made of the defect.  This telfa template was then used to outline the melolabial interpolation flap.  The donor area for the pedicle flap was then injected with anesthesia.  The flap was excised through the skin and subcutaneous tissue down to the layer of the underlying musculature.  The pedicle flap was carefully excised within this deep plane to maintain its blood supply.  The edges of the donor site were undermined.   The donor site was closed in a primary fashion.  The pedicle was then rotated into position and sutured.  Once the tube was sutured into place, adequate blood supply was confirmed with blanching and refill.  The pedicle was then wrapped with xeroform gauze and dressed appropriately with a telfa and gauze bandage to ensure continued blood supply and protect the attached pedicle.
Perineural Invasion (For Histology - Be Specific If Possible): absent
Banner Transposition Flap Text: The defect edges were debeveled with a #15 scalpel blade.  Given the location of the defect and the proximity to free margins a Banner transposition flap was deemed most appropriate.  Using a sterile surgical marker, an appropriate flap drawn around the defect. The area thus outlined was incised deep to adipose tissue with a #15 scalpel blade.  The skin margins were undermined to an appropriate distance in all directions utilizing iris scissors.
Stage 8: Additional Anesthesia Volume In Cc: 0
Presence Of Scar Tissue (For Histology): present
Bill 59 Modifier?: No - Continue to Bill 79 Modifier
Island Pedicle Flap With Canthal Suspension Text: The defect edges were debeveled with a #15 scalpel blade.  Given the location of the defect, shape of the defect and the proximity to free margins an island pedicle advancement flap was deemed most appropriate.  Using a sterile surgical marker, an appropriate advancement flap was drawn incorporating the defect, outlining the appropriate donor tissue and placing the expected incisions within the relaxed skin tension lines where possible. The area thus outlined was incised deep to adipose tissue with a #15 scalpel blade.  The skin margins were undermined to an appropriate distance in all directions around the primary defect and laterally outward around the island pedicle utilizing iris scissors.  There was minimal undermining beneath the pedicle flap. A suspension suture was placed in the canthal tendon to prevent tension and prevent ectropion.
Referring Physician (Optional): Mandi Rivera MD
Integrate Histology Into Note?: Yes
Mercedes Flap Text: The defect edges were debeveled with a #15 scalpel blade.  Given the location of the defect, shape of the defect and the proximity to free margins a Mercedes flap was deemed most appropriate.  Using a sterile surgical marker, an appropriate advancement flap was drawn incorporating the defect and placing the expected incisions within the relaxed skin tension lines where possible. The area thus outlined was incised deep to adipose tissue with a #15 scalpel blade.  The skin margins were undermined to an appropriate distance in all directions utilizing iris scissors.
Composite Graft Text: The defect edges were debeveled with a #15 scalpel blade.  Given the location of the defect, shape of the defect, the proximity to free margins and the fact the defect was full thickness a composite graft was deemed most appropriate.  The defect was outline and then transferred to the donor site.  A full thickness graft was then excised from the donor site. The graft was then placed in the primary defect, oriented appropriately and then sutured into place.  The secondary defect was then repaired using a primary closure.
Pain Refusal Text: I offered to prescribe pain medication but the patient refused to take this medication.
Posterior Auricular Interpolation Flap Text: A decision was made to reconstruct the defect utilizing an interpolation axial flap and a staged reconstruction.  A telfa template was made of the defect.  This telfa template was then used to outline the posterior auricular interpolation flap.  The donor area for the pedicle flap was then injected with anesthesia.  The flap was excised through the skin and subcutaneous tissue down to the layer of the underlying musculature.  The pedicle flap was carefully excised within this deep plane to maintain its blood supply.  The edges of the donor site were undermined.   The donor site was closed in a primary fashion.  The pedicle was then rotated into position and sutured.  Once the tube was sutured into place, adequate blood supply was confirmed with blanching and refill.  The pedicle was then wrapped with xeroform gauze and dressed appropriately with a telfa and gauze bandage to ensure continued blood supply and protect the attached pedicle.
Consent (Marginal Mandibular)/Introductory Paragraph: The rationale for Mohs was explained to the patient and consent was obtained. The risks, benefits and alternatives to therapy were discussed in detail. Specifically, the risks of damage to the marginal mandibular branch of the facial nerve, infection, scarring, bleeding, prolonged wound healing, incomplete removal, allergy to anesthesia, and recurrence were addressed. Prior to the procedure, the treatment site was clearly identified and confirmed by the patient. All components of Universal Protocol/PAUSE Rule completed.
Oculoplastic Surgeon Procedure Text (F): After obtaining clear surgical margins the patient was sent to oculoplastics for surgical repair.  The patient understands they will receive post-surgical care and follow-up from the referring physician's office.
Surgeon: Ever Pedraza MD
W Plasty Text: The lesion was extirpated to the level of the fat with a #15 scalpel blade.  Given the location of the defect, shape of the defect and the proximity to free margins a W-plasty was deemed most appropriate for repair.  Using a sterile surgical marker, the appropriate transposition arms of the W-plasty were drawn incorporating the defect and placing the expected incisions within the relaxed skin tension lines where possible.    The area thus outlined was incised deep to adipose tissue with a #15 scalpel blade.  The skin margins were undermined to an appropriate distance in all directions utilizing iris scissors.  The opposing transposition arms were then transposed into place in opposite direction and anchored with interrupted buried subcutaneous sutures.
Stage 6: Additional Anesthesia Type: 1% lidocaine with epinephrine
Mustarde Flap Text: The defect edges were debeveled with a #15 scalpel blade.  Given the size, depth and location of the defect and the proximity to free margins a Mustarde flap was deemed most appropriate.  Using a sterile surgical marker, an appropriate flap was drawn incorporating the defect. The area thus outlined was incised with a #15 scalpel blade.  The skin margins were undermined to an appropriate distance in all directions utilizing iris scissors.
Postop Diagnosis: same
Tarsorrhaphy Text: A tarsorrhaphy was performed using Frost sutures.
Epidermal Closure: running
Manual Repair Warning Statement: We plan on removing the manually selected variable below in favor of our much easier automatic structured text blocks found in the previous tab. We decided to do this to help make the flow better and give you the full power of structured data. Manual selection is never going to be ideal in our platform and I would encourage you to avoid using manual selection from this point on, especially since I will be sunsetting this feature. It is important that you do one of two things with the customized text below. First, you can save all of the text in a word file so you can have it for future reference. Second, transfer the text to the appropriate area in the Library tab. Lastly, if there is a flap or graft type which we do not have you need to let us know right away so I can add it in before the variable is hidden. No need to panic, we plan to give you roughly 6 months to make the change.
Stage 3: Number Of Blocks?: 1
Mucosal Advancement Flap Text: Given the location of the defect, shape of the defect and the proximity to free margins a mucosal advancement flap was deemed most appropriate. Incisions were made with a 15 blade scalpel in the appropriate fashion along the cutaneous vermilion border and the mucosal lip. The remaining actinically damaged mucosal tissue was excised.  The mucosal advancement flap was then elevated to the gingival sulcus with care taken to preserve the neurovascular structures and advanced into the primary defect. Care was taken to ensure that precise realignment of the vermilion border was achieved.
Alternatives Discussed Intro (Do Not Add Period): I discussed alternative treatments to Mohs surgery and specifically discussed the risks and benefits of
O-T Advancement Flap Text: The defect edges were debeveled with a #15 scalpel blade.  Given the location of the defect, shape of the defect and the proximity to free margins an O-T advancement flap was deemed most appropriate.  Using a sterile surgical marker, an appropriate advancement flap was drawn incorporating the defect and placing the expected incisions within the relaxed skin tension lines where possible.    The area thus outlined was incised deep to adipose tissue with a #15 scalpel blade.  The skin margins were undermined to an appropriate distance in all directions utilizing iris scissors.
Rotation Flap Text: The defect edges were debeveled with a #15 scalpel blade.  Given the location of the defect, shape of the defect and the proximity to free margins a rotation flap was deemed most appropriate.  Using a sterile surgical marker, an appropriate rotation flap was drawn incorporating the defect and placing the expected incisions within the relaxed skin tension lines where possible.    The area thus outlined was incised deep to adipose tissue with a #15 scalpel blade.  The skin margins were undermined to an appropriate distance in all directions utilizing iris scissors.
Complex Repair And Graft Additional Text (Will Appearing After The Standard Complex Repair Text): The complex repair was not sufficient to completely close the primary defect. The remaining additional defect was repaired with the graft mentioned below.
Is There Documentation In The Chart Showing Discussion Of Changes With Another Physician?: Please Select the Appropriate Response
Crescentic Advancement Flap Text: The defect edges were debeveled with a #15 scalpel blade.  Given the location of the defect and the proximity to free margins a crescentic advancement flap was deemed most appropriate.  Using a sterile surgical marker, the appropriate advancement flap was drawn incorporating the defect and placing the expected incisions within the relaxed skin tension lines where possible.    The area thus outlined was incised deep to adipose tissue with a #15 scalpel blade.  The skin margins were undermined to an appropriate distance in all directions utilizing iris scissors.
Post-Care Instructions: I reviewed with the patient in detail post-care instructions. Patient is not to engage in any heavy lifting, exercise, or swimming for the next 14 days. Should the patient develop any fevers, chills, bleeding, severe pain patient will contact the office immediately.
Wound Care: Vaseline
Flip-Flop Flap Text: The defect edges were debeveled with a #15 blade scalpel.  Given the location of the defect and the proximity to free margins a flip-flop flap was deemed most appropriate. Using a sterile surgical marker, the appropriate flap was drawn incorporating the defect and placing the expected incisions between the helical rim and antihelix where possible.  The area thus outlined was incised through and through with a #15 scalpel blade. Following this, the designed flap was carried over into the primary defect and sutured into place.
Area L Indication Text: Tumors in this location are included in Area L (trunk and extremities).  Mohs surgery is indicated for larger tumors, or tumors with aggressive histologic features, in these anatomic locations.
Graft Cartilage Fenestration Text: The cartilage was fenestrated with a 2mm punch biopsy to help facilitate graft survival and healing.
Helical Rim Text: The closure involved the helical rim.
Epidermal Autograft Text: The defect edges were debeveled with a #15 scalpel blade.  Given the location of the defect, shape of the defect and the proximity to free margins an epidermal autograft was deemed most appropriate.  Using a sterile surgical marker, the primary defect shape was transferred to the donor site. The epidermal graft was then harvested.  The skin graft was then placed in the primary defect and oriented appropriately.
Estlander Flap (Lower To Upper Lip) Text: The defect of the lower lip was assessed and measured.  Given the location and size of the defect, an Estlander flap was deemed most appropriate.  Using a sterile surgical marker, an appropriate Estlander flap was measured and drawn on the upper lip. Local anesthesia was then infiltrated. A scalpel was then used to incise the lateral aspect of the flap, through skin, muscle and mucosa, leaving the flap pedicled medially.  The flap was then rotated and positioned to fill the lower lip defect.  The flap was then sutured into place with a three layer technique, closing the orbicularis oris muscle layer with subcutaneous buried sutures, followed by a mucosal layer and an epidermal layer.
Repair Anesthesia Method: local infiltration
Rhomboid Transposition Flap Text: The defect edges were debeveled with a #15 scalpel blade.  Given the location of the defect and the proximity to free margins a rhomboid transposition flap was deemed most appropriate.  Using a sterile surgical marker, an appropriate rhomboid flap was drawn incorporating the defect.    The area thus outlined was incised deep to adipose tissue with a #15 scalpel blade.  The skin margins were undermined to an appropriate distance in all directions utilizing iris scissors.
Interpolation Flap Text: A decision was made to reconstruct the defect utilizing an interpolation axial flap and a staged reconstruction.  A telfa template was made of the defect.  This telfa template was then used to outline the interpolation flap.  The donor area for the pedicle flap was then injected with anesthesia.  The flap was excised through the skin and subcutaneous tissue down to the layer of the underlying musculature.  The interpolation flap was carefully excised within this deep plane to maintain its blood supply.  The edges of the donor site were undermined.   The donor site was closed in a primary fashion.  The pedicle was then rotated into position and sutured.  Once the tube was sutured into place, adequate blood supply was confirmed with blanching and refill.  The pedicle was then wrapped with xeroform gauze and dressed appropriately with a telfa and gauze bandage to ensure continued blood supply and protect the attached pedicle.
Bilateral Rotation Flap Text: The defect edges were debeveled with a #15 scalpel blade. Given the location of the defect, shape of the defect and the proximity to free margins a bilateral rotation flap was deemed most appropriate. Using a sterile surgical marker, an appropriate rotation flap was drawn incorporating the defect and placing the expected incisions within the relaxed skin tension lines where possible. The area thus outlined was incised deep to adipose tissue with a #15 scalpel blade. The skin margins were undermined to an appropriate distance in all directions utilizing iris scissors. Following this, the designed flap was carried over into the primary defect and sutured into place.
Eye Protection Verbiage: Before proceeding with the stage, a plastic scleral shield was inserted. The globe was anesthetized with a few drops of 1% lidocaine with 1:100,000 epinephrine. Then, an appropriate sized scleral shield was chosen and coated with lacrilube ointment. The shield was gently inserted and left in place for the duration of each stage. After the stage was completed, the shield was gently removed.
Z Plasty Text: The lesion was extirpated to the level of the fat with a #15 scalpel blade.  Given the location of the defect, shape of the defect and the proximity to free margins a Z-plasty was deemed most appropriate for repair.  Using a sterile surgical marker, the appropriate transposition arms of the Z-plasty were drawn incorporating the defect and placing the expected incisions within the relaxed skin tension lines where possible.    The area thus outlined was incised deep to adipose tissue with a #15 scalpel blade.  The skin margins were undermined to an appropriate distance in all directions utilizing iris scissors.  The opposing transposition arms were then transposed into place in opposite direction and anchored with interrupted buried subcutaneous sutures.
Muscle Hinge Flap Text: The defect edges were debeveled with a #15 scalpel blade.  Given the size, depth and location of the defect and the proximity to free margins a muscle hinge flap was deemed most appropriate.  Using a sterile surgical marker, an appropriate hinge flap was drawn incorporating the defect. The area thus outlined was incised with a #15 scalpel blade.  The skin margins were undermined to an appropriate distance in all directions utilizing iris scissors.
Inflammation Suggestive Of Cancer Camouflage Histology Text: There was a dense lymphocytic infiltrate which prevented adequate histologic evaluation of adjacent structures.
Split-Thickness Skin Graft Text: The defect edges were debeveled with a #15 scalpel blade.  Given the location of the defect, shape of the defect and the proximity to free margins a split thickness skin graft was deemed most appropriate.  Using a sterile surgical marker, the primary defect shape was transferred to the donor site. The split thickness graft was then harvested.  The skin graft was then placed in the primary defect and oriented appropriately.
Cheiloplasty (Less Than 50%) Text: A decision was made to reconstruct the defect with a  cheiloplasty.  The defect was undermined extensively.  Additional obicularis oris muscle was excised with a 15 blade scalpel.  The defect was converted into a full thickness wedge, of less than 50% of the vertical height of the lip, to facilite a better cosmetic result.  Small vessels were then tied off with 5-0 monocyrl. The obicularis oris, superficial fascia, adipose and dermis were then reapproximated.  After the deeper layers were approximated the epidermis was reapproximated with particular care given to realign the vermilion border.
Spiral Flap Text: The defect edges were debeveled with a #15 scalpel blade.  Given the location of the defect, shape of the defect and the proximity to free margins a spiral flap was deemed most appropriate.  Using a sterile surgical marker, an appropriate rotation flap was drawn incorporating the defect and placing the expected incisions within the relaxed skin tension lines where possible. The area thus outlined was incised deep to adipose tissue with a #15 scalpel blade.  The skin margins were undermined to an appropriate distance in all directions utilizing iris scissors.
Eyelid Full Thickness Repair - 13290: The eyelid defect was full thickness which required a wedge repair of the eyelid. Special care was taken to ensure that the eyelid margin was realligned when placing sutures.
Dressing: pressure dressing with telfa
Same Histology In Subsequent Stages Text: The pattern and morphology of the tumor is as described in the first stage.
Secondary Intention Text (Leave Blank If You Do Not Want): The defect will heal with secondary intention.
Anesthesia Volume In Cc: 3.5
Tumor Debulked?: curette
Consent (Near Eyelid Margin)/Introductory Paragraph: The rationale for Mohs was explained to the patient and consent was obtained. The risks, benefits and alternatives to therapy were discussed in detail. Specifically, the risks of ectropion or eyelid deformity, infection, scarring, bleeding, prolonged wound healing, incomplete removal, allergy to anesthesia, nerve injury and recurrence were addressed. Prior to the procedure, the treatment site was clearly identified and confirmed by the patient. All components of Universal Protocol/PAUSE Rule completed.
Intermediate Repair And Graft Additional Text (Will Appearing After The Standard Complex Repair Text): The intermediate repair was not sufficient to completely close the primary defect. The remaining additional defect was repaired with the graft mentioned below.
Bilateral Helical Rim Advancement Flap Text: The defect edges were debeveled with a #15 blade scalpel.  Given the location of the defect and the proximity to free margins (helical rim) a bilateral helical rim advancement flap was deemed most appropriate.  Using a sterile surgical marker, the appropriate advancement flaps were drawn incorporating the defect and placing the expected incisions between the helical rim and antihelix where possible.  The area thus outlined was incised through and through with a #15 scalpel blade.  With a skin hook and iris scissors, the flaps were gently and sharply undermined and freed up.
Crescentic Complex Repair Preamble Text (Leave Blank If You Do Not Want): Extensive wide undermining was performed.
Donor Site Anesthesia Type: same as repair anesthesia
Referred To Plastics For Closure Text (Leave Blank If You Do Not Want): After obtaining clear surgical margins the patient was sent to plastics for surgical repair.  The patient understands they will receive post-surgical care and follow-up from the referring physician's office.
Mid-Level Procedure Text (F): After obtaining clear surgical margins the patient was sent to a mid-level provider for surgical repair.  The patient understands they will receive post-surgical care and follow-up from the mid-level provider.
Intermediate Repair Preamble Text (Leave Blank If You Do Not Want): Undermining was performed with blunt dissection.
Suturegard Body: The suture ends were repeatedly re-tightened and re-clamped to achieve the desired tissue expansion.
Keystone Flap Text: The defect edges were debeveled with a #15 scalpel blade.  Given the location of the defect, shape of the defect a keystone flap was deemed most appropriate.  Using a sterile surgical marker, an appropriate keystone flap was drawn incorporating the defect, outlining the appropriate donor tissue and placing the expected incisions within the relaxed skin tension lines where possible. The area thus outlined was incised deep to adipose tissue with a #15 scalpel blade.  The skin margins were undermined to an appropriate distance in all directions around the primary defect and laterally outward around the flap utilizing iris scissors.
Consent (Ear)/Introductory Paragraph: The rationale for Mohs was explained to the patient and consent was obtained. The risks, benefits and alternatives to therapy were discussed in detail. Specifically, the risks of ear deformity, infection, scarring, bleeding, prolonged wound healing, incomplete removal, allergy to anesthesia, nerve injury and recurrence were addressed. Prior to the procedure, the treatment site was clearly identified and confirmed by the patient. All components of Universal Protocol/PAUSE Rule completed.
Mart-1 - Positive Histology Text: MART-1 staining demonstrates areas of higher density and clustering of melanocytes with Pagetoid spread upwards within the epidermis. The surgical margins are positive for tumor cells.
Mart-1 - Negative Histology Text: MART-1 staining demonstrates a normal density and pattern of melanocytes along the dermal-epidermal junction. The surgical margins are negative for tumor cells.
Previous Accession (Optional): [id number]
Pinch Graft Text: The defect edges were debeveled with a #15 scalpel blade. Given the location of the defect, shape of the defect and the proximity to free margins a pinch graft was deemed most appropriate. Using a sterile surgical marker, the primary defect shape was transferred to the donor site. The area thus outlined was incised deep to adipose tissue with a #15 scalpel blade.  The harvested graft was then trimmed of adipose tissue until only dermis and epidermis was left. The skin margins of the secondary defect were undermined to an appropriate distance in all directions utilizing iris scissors.  The secondary defect was closed with interrupted buried subcutaneous sutures.  The skin edges were then re-apposed with running  sutures.  The skin graft was then placed in the primary defect and oriented appropriately.
Complex Repair And Flap Additional Text (Will Appearing After The Standard Complex Repair Text): The complex repair was not sufficient to completely close the primary defect. The remaining additional defect was repaired with the flap mentioned below.
Mastoid Interpolation Flap Text: A decision was made to reconstruct the defect utilizing an interpolation axial flap and a staged reconstruction.  A telfa template was made of the defect.  This telfa template was then used to outline the mastoid interpolation flap.  The donor area for the pedicle flap was then injected with anesthesia.  The flap was excised through the skin and subcutaneous tissue down to the layer of the underlying musculature.  The pedicle flap was carefully excised within this deep plane to maintain its blood supply.  The edges of the donor site were undermined.   The donor site was closed in a primary fashion.  The pedicle was then rotated into position and sutured.  Once the tube was sutured into place, adequate blood supply was confirmed with blanching and refill.  The pedicle was then wrapped with xeroform gauze and dressed appropriately with a telfa and gauze bandage to ensure continued blood supply and protect the attached pedicle.
Graft Donor Site Bandage (Optional-Leave Blank If You Don't Want In Note): A bolster was fitted to the graft site and sewn into place. A pressure bandage were applied to the donor site and over the bolster.
Double Z Plasty Text: The lesion was extirpated to the level of the fat with a #15 scalpel blade. Given the location of the defect, shape of the defect and the proximity to free margins a double Z-plasty was deemed most appropriate for repair. Using a sterile surgical marker, the appropriate transposition arms of the double Z-plasty were drawn incorporating the defect and placing the expected incisions within the relaxed skin tension lines where possible. The area thus outlined was incised deep to adipose tissue with a #15 scalpel blade. The skin margins were undermined to an appropriate distance in all directions utilizing iris scissors. The opposing transposition arms were then transposed and carried over into place in opposite direction and anchored with interrupted buried subcutaneous sutures.
Undermining Type: Entire Wound
Burow's Advancement Flap Text: The defect edges were debeveled with a #15 scalpel blade.  Given the location of the defect and the proximity to free margins a Burow's advancement flap was deemed most appropriate.  Using a sterile surgical marker, the appropriate advancement flap was drawn incorporating the defect and placing the expected incisions within the relaxed skin tension lines where possible.    The area thus outlined was incised deep to adipose tissue with a #15 scalpel blade.  The skin margins were undermined to an appropriate distance in all directions utilizing iris scissors.
Debridement Text: The wound edges were debrided prior to proceeding with the closure to facilitate wound healing.
Repair Hemostasis (Optional): Pinpoint electrocautery
Retention Suture Bite Size: 1 mm
Chonodrocutaneous Helical Advancement Flap Text: The defect edges were debeveled with a #15 scalpel blade.  Given the location of the defect and the proximity to free margins a chondrocutaneous helical advancement flap was deemed most appropriate.  Using a sterile surgical marker, the appropriate advancement flap was drawn incorporating the defect and placing the expected incisions within the relaxed skin tension lines where possible.    The area thus outlined was incised deep to adipose tissue with a #15 scalpel blade.  The skin margins were undermined to an appropriate distance in all directions utilizing iris scissors.
Hemigard Retention Suture: 0-0 Nylon
Consent (Spinal Accessory)/Introductory Paragraph: The rationale for Mohs was explained to the patient and consent was obtained. The risks, benefits and alternatives to therapy were discussed in detail. Specifically, the risks of damage to the spinal accessory nerve, infection, scarring, bleeding, prolonged wound healing, incomplete removal, allergy to anesthesia, and recurrence were addressed. Prior to the procedure, the treatment site was clearly identified and confirmed by the patient. All components of Universal Protocol/PAUSE Rule completed.
Localized Dermabrasion With 15 Blade Text: The patient was draped in routine manner.  Localized dermabrasion using a 15 blade was performed in routine manner to papillary dermis. This spot dermabrasion is being performed to complete skin cancer reconstruction. It also will eliminate the other sun damaged precancerous cells that are known to be part of the regional effect of a lifetime's worth of sun exposure. This localized dermabrasion is therapeutic and should not be considered cosmetic in any regard.
Paramedian Forehead Flap Text: A decision was made to reconstruct the defect utilizing an interpolation axial flap and a staged reconstruction.  A telfa template was made of the defect.  This telfa template was then used to outline the paramedian forehead pedicle flap.  The donor area for the pedicle flap was then injected with anesthesia.  The flap was excised through the skin and subcutaneous tissue down to the layer of the underlying musculature.  The pedicle flap was carefully excised within this deep plane to maintain its blood supply.  The edges of the donor site were undermined.   The donor site was closed in a primary fashion.  The pedicle was then rotated into position and sutured.  Once the tube was sutured into place, adequate blood supply was confirmed with blanching and refill.  The pedicle was then wrapped with xeroform gauze and dressed appropriately with a telfa and gauze bandage to ensure continued blood supply and protect the attached pedicle.
Star Wedge Flap Text: The defect edges were debeveled with a #15 scalpel blade.  Given the location of the defect, shape of the defect and the proximity to free margins a star wedge flap was deemed most appropriate.  Using a sterile surgical marker, an appropriate rotation flap was drawn incorporating the defect and placing the expected incisions within the relaxed skin tension lines where possible. The area thus outlined was incised deep to adipose tissue with a #15 scalpel blade.  The skin margins were undermined to an appropriate distance in all directions utilizing iris scissors.
Orbicularis Oris Muscle Flap Text: The defect edges were debeveled with a #15 scalpel blade.  Given that the defect affected the competency of the oral sphincter an orbicularis oris muscle flap was deemed most appropriate to restore this competency and normal muscle function.  Using a sterile surgical marker, an appropriate flap was drawn incorporating the defect. The area thus outlined was incised with a #15 scalpel blade.
H Plasty Text: Given the location of the defect, shape of the defect and the proximity to free margins a H-plasty was deemed most appropriate for repair.  Using a sterile surgical marker, the appropriate advancement arms of the H-plasty were drawn incorporating the defect and placing the expected incisions within the relaxed skin tension lines where possible. The area thus outlined was incised deep to adipose tissue with a #15 scalpel blade. The skin margins were undermined to an appropriate distance in all directions utilizing iris scissors.  The opposing advancement arms were then advanced into place in opposite direction and anchored with interrupted buried subcutaneous sutures.
Depth Of Tumor Invasion (For Histology): dermis
V-Y Plasty Text: The defect edges were debeveled with a #15 scalpel blade.  Given the location of the defect, shape of the defect and the proximity to free margins an V-Y advancement flap was deemed most appropriate.  Using a sterile surgical marker, an appropriate advancement flap was drawn incorporating the defect and placing the expected incisions within the relaxed skin tension lines where possible.    The area thus outlined was incised deep to adipose tissue with a #15 scalpel blade.  The skin margins were undermined to an appropriate distance in all directions utilizing iris scissors.
Detail Level: Detailed
Tumor Depth: Less than 6mm from granular layer and no invasion beyond the subcutaneous fat
Dorsal Nasal Flap Text: The defect edges were debeveled with a #15 scalpel blade.  Given the location of the defect and the proximity to free margins a dorsal nasal flap was deemed most appropriate.  Using a sterile surgical marker, an appropriate dorsal nasal flap was drawn around the defect.    The area thus outlined was incised deep to adipose tissue with a #15 scalpel blade.  The skin margins were undermined to an appropriate distance in all directions utilizing iris scissors.
Adjacent Tissue Transfer Text: The defect edges were debeveled with a #15 scalpel blade.  Given the location of the defect and the proximity to free margins an adjacent tissue transfer was deemed most appropriate.  Using a sterile surgical marker, an appropriate flap was drawn incorporating the defect and placing the expected incisions within the relaxed skin tension lines where possible.    The area thus outlined was incised deep to adipose tissue with a #15 scalpel blade.  The skin margins were undermined to an appropriate distance in all directions utilizing iris scissors.
Mohs Rapid Report Verbiage: The area of clinically evident tumor was marked with skin marking ink and appropriately hatched.  The initial incision was made following the Mohs approach through the skin.  The specimen was taken to the lab, divided into the necessary number of pieces, chromacoded and processed according to the Mohs protocol.  This was repeated in successive stages until a tumor free defect was achieved.
Advancement Flap (Double) Text: The defect edges were debeveled with a #15 scalpel blade.  Given the location of the defect and the proximity to free margins a double advancement flap was deemed most appropriate.  Using a sterile surgical marker, the appropriate advancement flaps were drawn incorporating the defect and placing the expected incisions within the relaxed skin tension lines where possible.    The area thus outlined was incised deep to adipose tissue with a #15 scalpel blade.  The skin margins were undermined to an appropriate distance in all directions utilizing iris scissors.
Otolaryngologist Procedure Text (B): After obtaining clear surgical margins the patient was sent to otolaryngology for surgical repair.  The patient understands they will receive post-surgical care and follow-up from the referring physician's office.
Skin Substitute Text: Given the location of the defect, shape/depth of the defect and the underlying tissue, a skin substitute graft was deemed most appropriate.  The graft was placed in the primary defect and oriented appropriately.
Wound Care (No Sutures): Petrolatum
Consent 2/Introductory Paragraph: Mohs surgery was explained to the patient and consent was obtained. The risks, benefits and alternatives to therapy were discussed in detail. Specifically, the risks of infection, scarring, bleeding, prolonged wound healing, incomplete removal, allergy to anesthesia, nerve injury and recurrence were addressed. Prior to the procedure, the treatment site was clearly identified and confirmed by the patient. All components of Universal Protocol/PAUSE Rule completed.
Nasal Turnover Hinge Flap Text: The defect edges were debeveled with a #15 scalpel blade.  Given the size, depth, location of the defect and the defect being full thickness a nasal turnover hinge flap was deemed most appropriate.  Using a sterile surgical marker, an appropriate hinge flap was drawn incorporating the defect. The area thus outlined was incised with a #15 scalpel blade. The flap was designed to recreate the nasal mucosal lining and the alar rim. The skin margins were undermined to an appropriate distance in all directions utilizing iris scissors.
Suturegard Intro: Intraoperative tissue expansion was performed, utilizing the SUTUREGARD device, in order to reduce wound tension.
Closure 4 Information: This tab is for additional flaps and grafts above and beyond our usual structured repairs.  Please note if you enter information here it will not currently bill and you will need to add the billing information manually.
Consent (Lip)/Introductory Paragraph: The rationale for Mohs was explained to the patient and consent was obtained. The risks, benefits and alternatives to therapy were discussed in detail. Specifically, the risks of lip deformity, changes in the oral aperture, infection, scarring, bleeding, prolonged wound healing, incomplete removal, allergy to anesthesia, nerve injury and recurrence were addressed. Prior to the procedure, the treatment site was clearly identified and confirmed by the patient. All components of Universal Protocol/PAUSE Rule completed.
O-Z Plasty Text: The defect edges were debeveled with a #15 scalpel blade.  Given the location of the defect, shape of the defect and the proximity to free margins an O-Z plasty (double transposition flap) was deemed most appropriate.  Using a sterile surgical marker, the appropriate transposition flaps were drawn incorporating the defect and placing the expected incisions within the relaxed skin tension lines where possible.    The area thus outlined was incised deep to adipose tissue with a #15 scalpel blade.  The skin margins were undermined to an appropriate distance in all directions utilizing iris scissors.  Hemostasis was achieved with electrocautery.  The flaps were then transposed into place, one clockwise and the other counterclockwise, and anchored with interrupted buried subcutaneous sutures.
Closure 2 Information: This tab is for additional flaps and grafts, including complex repair and grafts and complex repair and flaps. You can also specify a different location for the additional defect, if the location is the same you do not need to select a new one. We will insert the automated text for the repair you select below just as we do for solitary flaps and grafts. Please note that at this time if you select a location with a different insurance zone you will need to override the ICD10 and CPT if appropriate.
Purse String (Intermediate) Text: Given the location of the defect and the characteristics of the surrounding skin a purse string intermediate closure was deemed most appropriate.  Undermining was performed circumfirentially around the surgical defect.  A purse string suture was then placed and tightened.
Rhombic Flap Text: The defect edges were debeveled with a #15 scalpel blade.  Given the location of the defect and the proximity to free margins a rhombic flap was deemed most appropriate.  Using a sterile surgical marker, an appropriate rhombic flap was drawn incorporating the defect.    The area thus outlined was incised deep to adipose tissue with a #15 scalpel blade.  The skin margins were undermined to an appropriate distance in all directions utilizing iris scissors.
Mohs Histo Method Verbiage: Each section was then chromacoded and processed in the Mohs lab using the Mohs protocol and submitted for frozen section.
Where Do You Want The Question To Include Opioid Counseling Located?: Case Summary Tab
Partial Purse String (Simple) Text: Given the location of the defect and the characteristics of the surrounding skin a simple purse string closure was deemed most appropriate.  Undermining was performed circumfirentially around the surgical defect.  A purse string suture was then placed and tightened. Wound tension only allowed a partial closure of the circular defect.
Mauc Instructions: By selecting yes to the question below the MAUC number will be added into the note.  This will be calculated automatically based on the diagnosis chosen, the size entered, the body zone selected (H,M,L) and the specific indications you chose. You will also have the option to override the Mohs AUC if you disagree with the automatically calculated number and this option is found in the Case Summary tab.
Trilobed Flap Text: The defect edges were debeveled with a #15 scalpel blade.  Given the location of the defect and the proximity to free margins a trilobed flap was deemed most appropriate.  Using a sterile surgical marker, an appropriate trilobed flap drawn around the defect.    The area thus outlined was incised deep to adipose tissue with a #15 scalpel blade.  The skin margins were undermined to an appropriate distance in all directions utilizing iris scissors.
Unna Boot Text: An Unna boot was placed to help immobilize the limb and facilitate more rapid healing.
Subsequent Stages Histo Method Verbiage: Using a similar technique to that described above, a thin layer of tissue was removed from all areas where tumor was visible on the previous stage.  The tissue was again oriented, mapped, dyed, and processed as above.
Which Eyelid Repair Cpt Are You Using?: 53074
Dermal Autograft Text: The defect edges were debeveled with a #15 scalpel blade.  Given the location of the defect, shape of the defect and the proximity to free margins a dermal autograft was deemed most appropriate.  Using a sterile surgical marker, the primary defect shape was transferred to the donor site. The area thus outlined was incised deep to adipose tissue with a #15 scalpel blade.  The harvested graft was then trimmed of adipose and epidermal tissue until only dermis was left.  The skin graft was then placed in the primary defect and oriented appropriately.
Transposition Flap Text: The defect edges were debeveled with a #15 scalpel blade.  Given the location of the defect and the proximity to free margins a transposition flap was deemed most appropriate.  Using a sterile surgical marker, an appropriate transposition flap was drawn incorporating the defect.    The area thus outlined was incised deep to adipose tissue with a #15 scalpel blade.  The skin margins were undermined to an appropriate distance in all directions utilizing iris scissors.
Surgeon/Pathologist Verbiage (Will Incorporate Name Of Surgeon From Intro If Not Blank): operated in two distinct and integrated capacities as the surgeon and pathologist.
Island Pedicle Flap-Requiring Vessel Identification Text: The defect edges were debeveled with a #15 scalpel blade.  Given the location of the defect, shape of the defect and the proximity to free margins an island pedicle advancement flap was deemed most appropriate.  Using a sterile surgical marker, an appropriate advancement flap was drawn, based on the axial vessel mentioned above, incorporating the defect, outlining the appropriate donor tissue and placing the expected incisions within the relaxed skin tension lines where possible.    The area thus outlined was incised deep to adipose tissue with a #15 scalpel blade.  The skin margins were undermined to an appropriate distance in all directions around the primary defect and laterally outward around the island pedicle utilizing iris scissors.  There was minimal undermining beneath the pedicle flap.
No Repair - Repaired With Adjacent Surgical Defect Text (Leave Blank If You Do Not Want): After obtaining clear surgical margins the defect was repaired concurrently with another surgical defect which was in close approximation.
Ftsg Text: The defect edges were debeveled with a #15 scalpel blade.  Given the location of the defect, shape of the defect and the proximity to free margins a full thickness skin graft was deemed most appropriate.  Using a sterile surgical marker, the primary defect shape was transferred to the donor site. The area thus outlined was incised deep to adipose tissue with a #15 scalpel blade.  The harvested graft was then trimmed of adipose tissue until only dermis and epidermis was left.  The skin margins of the secondary defect were undermined to an appropriate distance in all directions utilizing iris scissors.  The secondary defect was closed with interrupted buried subcutaneous sutures.  The skin edges were then re-apposed with running  sutures.  The skin graft was then placed in the primary defect and oriented appropriately.
Double Island Pedicle Flap Text: The defect edges were debeveled with a #15 scalpel blade.  Given the location of the defect, shape of the defect and the proximity to free margins a double island pedicle advancement flap was deemed most appropriate.  Using a sterile surgical marker, an appropriate advancement flap was drawn incorporating the defect, outlining the appropriate donor tissue and placing the expected incisions within the relaxed skin tension lines where possible.    The area thus outlined was incised deep to adipose tissue with a #15 scalpel blade.  The skin margins were undermined to an appropriate distance in all directions around the primary defect and laterally outward around the island pedicle utilizing iris scissors.  There was minimal undermining beneath the pedicle flap.
Bi-Rhombic Flap Text: The defect edges were debeveled with a #15 scalpel blade.  Given the location of the defect and the proximity to free margins a bi-rhombic flap was deemed most appropriate.  Using a sterile surgical marker, an appropriate rhombic flap was drawn incorporating the defect. The area thus outlined was incised deep to adipose tissue with a #15 scalpel blade.  The skin margins were undermined to an appropriate distance in all directions utilizing iris scissors.
Cheiloplasty (Complex) Text: A decision was made to reconstruct the defect with a  cheiloplasty.  The defect was undermined extensively.  Additional obicularis oris muscle was excised with a 15 blade scalpel.  The defect was converted into a full thickness wedge to facilite a better cosmetic result.  Small vessels were then tied off with 5-0 monocyrl. The obicularis oris, superficial fascia, adipose and dermis were then reapproximated.  After the deeper layers were approximated the epidermis was reapproximated with particular care given to realign the vermilion border.
Medical Necessity Statement: Based on my medical judgement, Mohs surgery is the most appropriate treatment for this cancer compared to other treatments.
Estimated Blood Loss (Cc): minimal
Consent 1/Introductory Paragraph: The rationale for Mohs was explained to the patient and consent was obtained. The risks, benefits and alternatives to therapy were discussed in detail. Specifically, the risks of infection, scarring, bleeding, prolonged wound healing, incomplete removal, allergy to anesthesia, nerve injury and recurrence were addressed. Prior to the procedure, the treatment site was clearly identified and confirmed by the patient. All components of Universal Protocol/PAUSE Rule completed.
Zygomaticofacial Flap Text: Given the location of the defect, shape of the defect and the proximity to free margins a zygomaticofacial flap was deemed most appropriate for repair.  Using a sterile surgical marker, the appropriate flap was drawn incorporating the defect and placing the expected incisions within the relaxed skin tension lines where possible. The area thus outlined was incised deep to adipose tissue with a #15 scalpel blade with preservation of a vascular pedicle.  The skin margins were undermined to an appropriate distance in all directions utilizing iris scissors.  The flap was then placed into the defect and anchored with interrupted buried subcutaneous sutures.
Non-Graft Cartilage Fenestration Text: The cartilage was fenestrated with a 2mm punch biopsy to help facilitate healing.
Staging Info: By selecting yes to the question above you will include information on AJCC 8 tumor staging in your Mohs note. Information on tumor staging will be automatically added for SCCs on the head and neck. AJCC 8 includes tumor size, tumor depth, perineural involvement and bone invasion.
Anesthesia Volume In Cc: 2.5
Mohs Method Verbiage: An incision at a 45 degree angle following the standard Mohs approach was done and the specimen was harvested as a microscopic controlled layer.
Hatchet Flap Text: The defect edges were debeveled with a #15 scalpel blade.  Given the location of the defect, shape of the defect and the proximity to free margins a hatchet flap was deemed most appropriate.  Using a sterile surgical marker, an appropriate hatchet flap was drawn incorporating the defect and placing the expected incisions within the relaxed skin tension lines where possible.    The area thus outlined was incised deep to adipose tissue with a #15 scalpel blade.  The skin margins were undermined to an appropriate distance in all directions utilizing iris scissors.
Abbe Flap (Upper To Lower Lip) Text: The defect of the lower lip was assessed and measured.  Given the location and size of the defect, an Abbe flap was deemed most appropriate.  Using a sterile surgical marker, an appropriate Abbe flap was measured and drawn on the upper lip. Local anesthesia was then infiltrated.  A scalpel was then used to incise the upper lip through and through the skin, vermilion, muscle and mucosa, leaving the flap pedicled on the opposite side.  The flap was then rotated and transferred to the lower lip defect.  The flap was then sutured into place with a three layer technique, closing the orbicularis oris muscle layer with subcutaneous buried sutures, followed by a mucosal layer and an epidermal layer.
Area M Indication Text: Tumors in this location are included in Area M (cheek, forehead, scalp, neck, jawline and pretibial skin).  Mohs surgery is indicated for tumors in these anatomic locations.
Melolabial Transposition Flap Text: The defect edges were debeveled with a #15 scalpel blade.  Given the location of the defect and the proximity to free margins a melolabial flap was deemed most appropriate.  Using a sterile surgical marker, an appropriate melolabial transposition flap was drawn incorporating the defect.    The area thus outlined was incised deep to adipose tissue with a #15 scalpel blade.  The skin margins were undermined to an appropriate distance in all directions utilizing iris scissors.
Consent (Nose)/Introductory Paragraph: The rationale for Mohs was explained to the patient and consent was obtained. The risks, benefits and alternatives to therapy were discussed in detail. Specifically, the risks of nasal deformity, changes in the flow of air through the nose, infection, scarring, bleeding, prolonged wound healing, incomplete removal, allergy to anesthesia, nerve injury and recurrence were addressed. Prior to the procedure, the treatment site was clearly identified and confirmed by the patient. All components of Universal Protocol/PAUSE Rule completed.
Nasalis Myocutaneous Flap Text: Using a #15 blade, an incision was made around the donor flap to the level of the nasalis muscle. Wide lateral undermining was then performed in both the subcutaneous plane above the nasalis muscle, and in a submuscular plane just above periosteum. This allowed the formation of a free nasalis muscle axial pedicle which was still attached to the actual cutaneous flap, increasing its mobility and vascular viability. Hemostasis was obtained with pinpoint electrocoagulation. The flap was mobilized into position and the pivotal anchor points positioned and stabilized with buried interrupted sutures. Subcutaneous and dermal tissues were closed in a multilayered fashion with sutures. Tissue redundancies were excised, and the epidermal edges were apposed without significant tension and sutured with sutures.
Bcc Infiltrative Histology Text: There were numerous aggregates of basaloid cells demonstrating an infiltrative pattern.
Information: Selecting Yes will display possible errors in your note based on the variables you have selected. This validation is only offered as a suggestion for you. PLEASE NOTE THAT THE VALIDATION TEXT WILL BE REMOVED WHEN YOU FINALIZE YOUR NOTE. IF YOU WANT TO FAX A PRELIMINARY NOTE YOU WILL NEED TO TOGGLE THIS TO 'NO' IF YOU DO NOT WANT IT IN YOUR FAXED NOTE.
Rectangular Flap Text: The defect edges were debeveled with a #15 scalpel blade. Given the location of the defect and the proximity to free margins a rectangular flap was deemed most appropriate. Using a sterile surgical marker, an appropriate rectangular flap was drawn incorporating the defect. The area thus outlined was incised deep to adipose tissue with a #15 scalpel blade. The skin margins were undermined to an appropriate distance in all directions utilizing iris scissors. Following this, the designed flap was carried over into the primary defect and sutured into place.
X Size Of Lesion In Cm (Optional): 0.9
Graft Donor Site Epidermal Sutures (Optional): Dermabond
Alar Island Pedicle Flap Text: The defect edges were debeveled with a #15 scalpel blade.  Given the location of the defect, shape of the defect and the proximity to the alar rim an island pedicle advancement flap was deemed most appropriate.  Using a sterile surgical marker, an appropriate advancement flap was drawn incorporating the defect, outlining the appropriate donor tissue and placing the expected incisions within the nasal ala running parallel to the alar rim. The area thus outlined was incised with a #15 scalpel blade.  The skin margins were undermined minimally to an appropriate distance in all directions around the primary defect and laterally outward around the island pedicle utilizing iris scissors.  There was minimal undermining beneath the pedicle flap.
Undermining Location (Optional): in the superficial subcutaneous fat
Hemigard Intro: Due to skin fragility and wound tension, it was decided to use HEMIGARD adhesive retention suture devices to permit a linear closure. The skin was cleaned and dried for a 6cm distance away from the wound. Excessive hair, if present, was removed to allow for adhesion.
Consent (Temporal Branch)/Introductory Paragraph: The rationale for Mohs was explained to the patient and consent was obtained. The risks, benefits and alternatives to therapy were discussed in detail. Specifically, the risks of damage to the temporal branch of the facial nerve, infection, scarring, bleeding, prolonged wound healing, incomplete removal, allergy to anesthesia, and recurrence were addressed. Prior to the procedure, the treatment site was clearly identified and confirmed by the patient. All components of Universal Protocol/PAUSE Rule completed.
Ear Wedge Repair Text: A wedge excision was completed by carrying down an excision through the full thickness of the ear and cartilage with an inward facing Burow's triangle. The wound was then closed in a layered fashion.
Bilobed Flap Text: The defect edges were debeveled with a #15 scalpel blade.  Given the location of the defect and the proximity to free margins a bilobe flap was deemed most appropriate.  Using a sterile surgical marker, an appropriate bilobe flap drawn around the defect.    The area thus outlined was incised deep to adipose tissue with a #15 scalpel blade.  The skin margins were undermined to an appropriate distance in all directions utilizing iris scissors.
Consent Type: Consent 1 (Standard)
Bcc Histology Text: There were numerous aggregates of basaloid cells.
Peng Advancement Flap Text: The defect edges were debeveled with a #15 scalpel blade.  Given the location of the defect, shape of the defect and the proximity to free margins a Peng advancement flap was deemed most appropriate.  Using a sterile surgical marker, an appropriate advancement flap was drawn incorporating the defect and placing the expected incisions within the relaxed skin tension lines where possible. The area thus outlined was incised deep to adipose tissue with a #15 scalpel blade.  The skin margins were undermined to an appropriate distance in all directions utilizing iris scissors.
Epidermal Sutures: 5-0 Fast Absorbing Gut
No Residual Tumor Seen Histology Text: There were no malignant cells seen in the sections examined.
Home Suture Removal Text: Patient was provided instructions on removing sutures and will remove their sutures at home.  If they have any questions or difficulties they will call the office.
Xenograft Text: The defect edges were debeveled with a #15 scalpel blade.  Given the location of the defect, shape of the defect and the proximity to free margins a xenograft was deemed most appropriate.  The graft was then trimmed to fit the size of the defect.  The graft was then placed in the primary defect and oriented appropriately.
Deep Sutures: 5-0 Monocryl
Partial Purse String (Intermediate) Text: Given the location of the defect and the characteristics of the surrounding skin an intermediate purse string closure was deemed most appropriate.  Undermining was performed circumfirentially around the surgical defect.  A purse string suture was then placed and tightened. Wound tension only allowed a partial closure of the circular defect.
O-T Plasty Text: The defect edges were debeveled with a #15 scalpel blade.  Given the location of the defect, shape of the defect and the proximity to free margins an O-T plasty was deemed most appropriate.  Using a sterile surgical marker, an appropriate O-T plasty was drawn incorporating the defect and placing the expected incisions within the relaxed skin tension lines where possible.    The area thus outlined was incised deep to adipose tissue with a #15 scalpel blade.  The skin margins were undermined to an appropriate distance in all directions utilizing iris scissors.
Localized Dermabrasion With Sand Papertext: The patient was draped in routine manner.  Localized dermabrasion using sterile sand paper was performed in routine manner to papillary dermis. This spot dermabrasion is being performed to complete skin cancer reconstruction. It also will eliminate the other sun damaged precancerous cells that are known to be part of the regional effect of a lifetime's worth of sun exposure. This localized dermabrasion is therapeutic and should not be considered cosmetic in any regard.
Purse String (Simple) Text: Given the location of the defect and the characteristics of the surrounding skin a purse string closure was deemed most appropriate.  Undermining was performed circumfirentially around the surgical defect.  A purse string suture was then placed and tightened.
Eyelid Partial Thickness Repair - 47270: The eyelid defect was partial thickness which required a wedge repair of the eyelid. Special care was taken to ensure that the eyelid margin was realligned when placing sutures.
Hemostasis: Electrocautery
Cheek Interpolation Flap Text: A decision was made to reconstruct the defect utilizing an interpolation axial flap and a staged reconstruction.  A telfa template was made of the defect.  This telfa template was then used to outline the Cheek Interpolation flap.  The donor area for the pedicle flap was then injected with anesthesia.  The flap was excised through the skin and subcutaneous tissue down to the layer of the underlying musculature.  The interpolation flap was carefully excised within this deep plane to maintain its blood supply.  The edges of the donor site were undermined.   The donor site was closed in a primary fashion.  The pedicle was then rotated into position and sutured.  Once the tube was sutured into place, adequate blood supply was confirmed with blanching and refill.  The pedicle was then wrapped with xeroform gauze and dressed appropriately with a telfa and gauze bandage to ensure continued blood supply and protect the attached pedicle.
Nostril Rim Text: The closure involved the nostril rim.
Cheek-To-Nose Interpolation Flap Text: A decision was made to reconstruct the defect utilizing an interpolation axial flap and a staged reconstruction.  A telfa template was made of the defect.  This telfa template was then used to outline the Cheek-To-Nose Interpolation flap.  The donor area for the pedicle flap was then injected with anesthesia.  The flap was excised through the skin and subcutaneous tissue down to the layer of the underlying musculature.  The interpolation flap was carefully excised within this deep plane to maintain its blood supply.  The edges of the donor site were undermined.   The donor site was closed in a primary fashion.  The pedicle was then rotated into position and sutured.  Once the tube was sutured into place, adequate blood supply was confirmed with blanching and refill.  The pedicle was then wrapped with xeroform gauze and dressed appropriately with a telfa and gauze bandage to ensure continued blood supply and protect the attached pedicle.
Hemigard Postcare Instructions: The HEMIGARD strips are to remain completely dry for at least 5-7 days.
Primary Defect Length In Cm (Final Defect Size - Required For Flaps/Grafts): 1.8
O-Z Flap Text: The defect edges were debeveled with a #15 scalpel blade.  Given the location of the defect, shape of the defect and the proximity to free margins an O-Z flap was deemed most appropriate.  Using a sterile surgical marker, an appropriate transposition flap was drawn incorporating the defect and placing the expected incisions within the relaxed skin tension lines where possible. The area thus outlined was incised deep to adipose tissue with a #15 scalpel blade.  The skin margins were undermined to an appropriate distance in all directions utilizing iris scissors.
V-Y Flap Text: The defect edges were debeveled with a #15 scalpel blade.  Given the location of the defect, shape of the defect and the proximity to free margins a V-Y flap was deemed most appropriate.  Using a sterile surgical marker, an appropriate advancement flap was drawn incorporating the defect and placing the expected incisions within the relaxed skin tension lines where possible.    The area thus outlined was incised deep to adipose tissue with a #15 scalpel blade.  The skin margins were undermined to an appropriate distance in all directions utilizing iris scissors.
Consent (Scalp)/Introductory Paragraph: The rationale for Mohs was explained to the patient and consent was obtained. The risks, benefits and alternatives to therapy were discussed in detail. Specifically, the risks of changes in hair growth pattern secondary to repair, infection, scarring, bleeding, prolonged wound healing, incomplete removal, allergy to anesthesia, nerve injury and recurrence were addressed. Prior to the procedure, the treatment site was clearly identified and confirmed by the patient. All components of Universal Protocol/PAUSE Rule completed.
Which Instrument Did You Use For Dermabrasion?: Wire Brush
Full Thickness Lip Wedge Repair (Flap) Text: Given the location of the defect and the proximity to free margins a full thickness wedge repair was deemed most appropriate.  Using a sterile surgical marker, the appropriate repair was drawn incorporating the defect and placing the expected incisions perpendicular to the vermilion border.  The vermilion border was also meticulously outlined to ensure appropriate reapproximation during the repair.  The area thus outlined was incised through and through with a #15 scalpel blade.  The muscularis and dermis were reaproximated with deep sutures following hemostasis. Care was taken to realign the vermilion border before proceeding with the superficial closure.  Once the vermilion was realigned the superfical and mucosal closure was finished.
Island Pedicle Flap Text: The defect edges were debeveled with a #15 scalpel blade.  Given the location of the defect, shape of the defect and the proximity to free margins an island pedicle advancement flap was deemed most appropriate.  Using a sterile surgical marker, an appropriate advancement flap was drawn incorporating the defect, outlining the appropriate donor tissue and placing the expected incisions within the relaxed skin tension lines where possible.    The area thus outlined was incised deep to adipose tissue with a #15 scalpel blade.  The skin margins were undermined to an appropriate distance in all directions around the primary defect and laterally outward around the island pedicle utilizing iris scissors.  There was minimal undermining beneath the pedicle flap.
Distance Of Undermining In Cm (Required): 1.5
Primary Defect Width In Cm (Final Defect Size - Required For Flaps/Grafts): 1.2
Consent 3/Introductory Paragraph: I gave the patient a chance to ask questions they had about the procedure.  Following this I explained the Mohs procedure and consent was obtained. The risks, benefits and alternatives to therapy were discussed in detail. Specifically, the risks of infection, scarring, bleeding, prolonged wound healing, incomplete removal, allergy to anesthesia, nerve injury and recurrence were addressed. Prior to the procedure, the treatment site was clearly identified and confirmed by the patient. All components of Universal Protocol/PAUSE Rule completed.
Retention Suture Text: Retention sutures were placed to support the closure and prevent dehiscence.
Repair Type: Complex Repair
Advancement-Rotation Flap Text: The defect edges were debeveled with a #15 scalpel blade.  Given the location of the defect, shape of the defect and the proximity to free margins an advancement-rotation flap was deemed most appropriate.  Using a sterile surgical marker, an appropriate flap was drawn incorporating the defect and placing the expected incisions within the relaxed skin tension lines where possible. The area thus outlined was incised deep to adipose tissue with a #15 scalpel blade.  The skin margins were undermined to an appropriate distance in all directions utilizing iris scissors.
Double O-Z Plasty Text: The defect edges were debeveled with a #15 scalpel blade.  Given the location of the defect, shape of the defect and the proximity to free margins a Double O-Z plasty (double transposition flap) was deemed most appropriate.  Using a sterile surgical marker, the appropriate transposition flaps were drawn incorporating the defect and placing the expected incisions within the relaxed skin tension lines where possible. The area thus outlined was incised deep to adipose tissue with a #15 scalpel blade.  The skin margins were undermined to an appropriate distance in all directions utilizing iris scissors.  Hemostasis was achieved with electrocautery.  The flaps were then transposed into place, one clockwise and the other counterclockwise, and anchored with interrupted buried subcutaneous sutures.
Helical Rim Advancement Flap Text: The defect edges were debeveled with a #15 blade scalpel.  Given the location of the defect and the proximity to free margins (helical rim) a double helical rim advancement flap was deemed most appropriate.  Using a sterile surgical marker, the appropriate advancement flaps were drawn incorporating the defect and placing the expected incisions between the helical rim and antihelix where possible.  The area thus outlined was incised through and through with a #15 scalpel blade.  With a skin hook and iris scissors, the flaps were gently and sharply undermined and freed up.
Area H Indication Text: Tumors in this location are included in Area H (eyelids, eyebrows, nose, lips, chin, ear, pre-auricular, post-auricular, temple, genitalia, hands, feet, ankles and areola).  Tissue conservation is critical in these anatomic locations.
O-L Flap Text: The defect edges were debeveled with a #15 scalpel blade.  Given the location of the defect, shape of the defect and the proximity to free margins an O-L flap was deemed most appropriate.  Using a sterile surgical marker, an appropriate advancement flap was drawn incorporating the defect and placing the expected incisions within the relaxed skin tension lines where possible.    The area thus outlined was incised deep to adipose tissue with a #15 scalpel blade.  The skin margins were undermined to an appropriate distance in all directions utilizing iris scissors.
Mohs Case Number: [id number]
Nasolabial Transposition Flap Text: The defect edges were debeveled with a #15 scalpel blade.  Given the size, depth and location of the defect and the proximity to free margins a nasolabial transposition flap was deemed most appropriate. Using a sterile surgical marker, an appropriate flap was drawn incorporating the defect. The area thus outlined was incised with a #15 scalpel blade. The skin margins were undermined to an appropriate distance in all directions utilizing iris scissors. Following this, the designed flap was carried into the primary defect and sutured into place.
Tissue Cultured Epidermal Autograft Text: The defect edges were debeveled with a #15 scalpel blade.  Given the location of the defect, shape of the defect and the proximity to free margins a tissue cultured epidermal autograft was deemed most appropriate.  The graft was then trimmed to fit the size of the defect.  The graft was then placed in the primary defect and oriented appropriately.
Advancement Flap (Single) Text: The defect edges were debeveled with a #15 scalpel blade.  Given the location of the defect and the proximity to free margins a single advancement flap was deemed most appropriate.  Using a sterile surgical marker, an appropriate advancement flap was drawn incorporating the defect and placing the expected incisions within the relaxed skin tension lines where possible.    The area thus outlined was incised deep to adipose tissue with a #15 scalpel blade.  The skin margins were undermined to an appropriate distance in all directions utilizing iris scissors.
Nasalis-Muscle-Based Myocutaneous Island Pedicle Flap Text: Using a #15 blade, an incision was made around the donor flap to the level of the nasalis muscle. Wide lateral undermining was then performed in both the subcutaneous plane above the nasalis muscle, and in a submuscular plane just above periosteum. This allowed the formation of a free nasalis muscle axial pedicle (based on the angular artery) which was still attached to the actual cutaneous flap, increasing its mobility and vascular viability. Hemostasis was obtained with pinpoint electrocoagulation. The flap was mobilized into position and the pivotal anchor points positioned and stabilized with buried interrupted sutures. Subcutaneous and dermal tissues were closed in a multilayered fashion with sutures. Tissue redundancies were excised, and the epidermal edges were apposed without significant tension and sutured with sutures.
Ear Star Wedge Flap Text: The defect edges were debeveled with a #15 blade scalpel.  Given the location of the defect and the proximity to free margins (helical rim) an ear star wedge flap was deemed most appropriate.  Using a sterile surgical marker, the appropriate flap was drawn incorporating the defect and placing the expected incisions between the helical rim and antihelix where possible.  The area thus outlined was incised through and through with a #15 scalpel blade.
A-T Advancement Flap Text: The defect edges were debeveled with a #15 scalpel blade.  Given the location of the defect, shape of the defect and the proximity to free margins an A-T advancement flap was deemed most appropriate.  Using a sterile surgical marker, an appropriate advancement flap was drawn incorporating the defect and placing the expected incisions within the relaxed skin tension lines where possible.    The area thus outlined was incised deep to adipose tissue with a #15 scalpel blade.  The skin margins were undermined to an appropriate distance in all directions utilizing iris scissors.
Intermediate Repair And Flap Additional Text (Will Appearing After The Standard Complex Repair Text): The intermediate repair was not sufficient to completely close the primary defect. The remaining additional defect was repaired with the flap mentioned below.
Brow Lift Text: A midfrontal incision was made medially to the defect to allow access to the tissues just superior to the left eyebrow. Following careful dissection inferiorly in a supraperiosteal plane to the level of the left eyebrow, several 3-0 monocryl sutures were used to resuspend the eyebrow orbicularis oculi muscular unit to the superior frontal bone periosteum. This resulted in an appropriate reapproximation of static eyebrow symmetry and correction of the left brow ptosis.
Double O-Z Flap Text: The defect edges were debeveled with a #15 scalpel blade.  Given the location of the defect, shape of the defect and the proximity to free margins a Double O-Z flap was deemed most appropriate.  Using a sterile surgical marker, an appropriate transposition flap was drawn incorporating the defect and placing the expected incisions within the relaxed skin tension lines where possible. The area thus outlined was incised deep to adipose tissue with a #15 scalpel blade.  The skin margins were undermined to an appropriate distance in all directions utilizing iris scissors.
Bilobed Transposition Flap Text: The defect edges were debeveled with a #15 scalpel blade.  Given the location of the defect and the proximity to free margins a bilobed transposition flap was deemed most appropriate.  Using a sterile surgical marker, an appropriate bilobe flap drawn around the defect.    The area thus outlined was incised deep to adipose tissue with a #15 scalpel blade.  The skin margins were undermined to an appropriate distance in all directions utilizing iris scissors.
Localized Dermabrasion With Wire Brush Text: The patient was draped in routine manner.  Localized dermabrasion using 3 x 17 mm wire brush was performed in routine manner to papillary dermis. This spot dermabrasion is being performed to complete skin cancer reconstruction. It also will eliminate the other sun damaged precancerous cells that are known to be part of the regional effect of a lifetime's worth of sun exposure. This localized dermabrasion is therapeutic and should not be considered cosmetic in any regard.
Staged Advancement Flap Text: The defect edges were debeveled with a #15 scalpel blade.  Given the location of the defect, shape of the defect and the proximity to free margins a staged advancement flap was deemed most appropriate.  Using a sterile surgical marker, an appropriate advancement flap was drawn incorporating the defect and placing the expected incisions within the relaxed skin tension lines where possible. The area thus outlined was incised deep to adipose tissue with a #15 scalpel blade.  The skin margins were undermined to an appropriate distance in all directions utilizing iris scissors.
Number Of Stages: 3
Vermilion Border Text: The closure involved the vermilion border.
Cartilage Graft Text: The defect edges were debeveled with a #15 scalpel blade.  Given the location of the defect, shape of the defect, the fact the defect involved a full thickness cartilage defect a cartilage graft was deemed most appropriate.  An appropriate donor site was identified, cleansed, and anesthetized. The cartilage graft was then harvested and transferred to the recipient site, oriented appropriately and then sutured into place.  The secondary defect was then repaired using a primary closure.
Modified Advancement Flap Text: The defect edges were debeveled with a #15 scalpel blade.  Given the location of the defect, shape of the defect and the proximity to free margins a modified advancement flap was deemed most appropriate.  Using a sterile surgical marker, an appropriate advancement flap was drawn incorporating the defect and placing the expected incisions within the relaxed skin tension lines where possible.    The area thus outlined was incised deep to adipose tissue with a #15 scalpel blade.  The skin margins were undermined to an appropriate distance in all directions utilizing iris scissors.
Abbe Flap (Lower To Upper Lip) Text: The defect of the upper lip was assessed and measured.  Given the location and size of the defect, an Abbe flap was deemed most appropriate.  Using a sterile surgical marker, an appropriate Abbe flap was measured and drawn on the lower lip. Local anesthesia was then infiltrated. A scalpel was then used to incise the upper lip through and through the skin, vermilion, muscle and mucosa, leaving the flap pedicled on the opposite side.  The flap was then rotated and transferred to the lower lip defect.  The flap was then sutured into place with a three layer technique, closing the orbicularis oris muscle layer with subcutaneous buried sutures, followed by a mucosal layer and an epidermal layer.

## (undated) DEVICE — ELECTRODE DUAL RETURN W/ CORD - (50/PK)

## (undated) DEVICE — TUBE CONNECTING SUCTION - CLEAR PLASTIC STERILE 72 IN (50EA/CA)

## (undated) DEVICE — NEPTUNE 4 PORT MANIFOLD - (20/PK)

## (undated) DEVICE — SET IRRIGATION CYSTOSCOPY Y-TYPE L81 IN (20EA/CA)

## (undated) DEVICE — LEGGING LITHOTOMY 31 X 48 IN - (2EA/PK 20PK/CA)

## (undated) DEVICE — GLOVE BIOGEL INDICATOR SZ 7.5 SURGICAL PF LTX - (50PR/BX 4BX/CA)

## (undated) DEVICE — DETERGENT RENUZYME PLUS 10 OZ PACKET (50/BX)

## (undated) DEVICE — PACK CYSTOSCOPY III - (8/CA)

## (undated) DEVICE — ELECTRODE 850 FOAM ADHESIVE - HYDROGEL RADIOTRNSPRNT (50/PK)

## (undated) DEVICE — CANISTER SUCTION RIGID RED 1500CC (40EA/CA)

## (undated) DEVICE — JELLY, KY 2 0Z STERILE

## (undated) DEVICE — WATER IRRIGATION STERILE 1000ML (12EA/CA)

## (undated) DEVICE — WATER IRRIG. STER. 1500 ML - (9/CA)

## (undated) DEVICE — SODIUM CHL IRRIGATION 0.9% 1000ML (12EA/CA)

## (undated) DEVICE — HEAD HOLDER JUNIOR/ADULT

## (undated) DEVICE — GOWN SURGICAL X-LARGE ULTRA - FILM-REINFORCED (20/CA)

## (undated) DEVICE — SET EXTENSION WITH 2 PORTS (48EA/CA) ***PART #2C8610 IS A SUBSTITUTE*****

## (undated) DEVICE — MASK, LARYNGEAL AIRWAY #4

## (undated) DEVICE — PROTECTOR ULNA NERVE - (36PR/CA)

## (undated) DEVICE — LEGGINGS IN-VIEW CLEAR POLY - (20PR/CA)

## (undated) DEVICE — SPONGE GAUZESTER 4 X 4 4PLY - (128PK/CA)

## (undated) DEVICE — KIT ANESTHESIA W/CIRCUIT & 3/LT BAG W/FILTER (20EA/CA)

## (undated) DEVICE — GOWN SURGEONS X-LARGE - DISP. (30/CA)

## (undated) DEVICE — GLOVE BIOGEL INDICATOR SZ 7SURGICAL PF LTX - (50/BX 4BX/CA)

## (undated) DEVICE — BASKET ZERO 1.9FR X 120CM

## (undated) DEVICE — LASER FIBER HOLM 365 MICRON 100 WATT (5EA/BX)

## (undated) DEVICE — JELLY, KY 5GM TUBES

## (undated) DEVICE — SENSOR SPO2 NEO LNCS ADHESIVE (20/BX) SEE USER NOTES

## (undated) DEVICE — CONTAINER, COLLECTION 10-LITER

## (undated) DEVICE — KIT ROOM DECONTAMINATION

## (undated) DEVICE — PACK CYSTOSCOPY - (14/CA)

## (undated) DEVICE — BAG URODRAIN WITH TUBING - (20/CA)

## (undated) DEVICE — GLOVE, LITE (PAIR)

## (undated) DEVICE — GLOVE BIOGEL SZ 7.5 SURGICAL PF LTX - (50PR/BX 4BX/CA)

## (undated) DEVICE — TUBE CONNECT SUCTION CLEAR 120 X 1/4" (50EA/CA)"

## (undated) DEVICE — HUMID-VENT HEAT AND MOISTURE EXCHANGE- (50/BX)

## (undated) DEVICE — MASK ANESTHESIA ADULT  - (100/CA)

## (undated) DEVICE — TUBING CLEARLINK DUO-VENT - C-FLO (48EA/CA)

## (undated) DEVICE — WATER IRRIG. STER 3000 ML - (4/CA)

## (undated) DEVICE — GOWN WARMING STANDARD FLEX - (30/CA)

## (undated) DEVICE — BAG, SPONGE COUNT 50600

## (undated) DEVICE — DRAPE LAP PELVISCOPY - (10/CA)

## (undated) DEVICE — GLOVE BIOGEL SZ 6.5 SURGICAL PF LTX (50PR/BX 4BX/CA)

## (undated) DEVICE — COVER FOOT UNIVERSAL DISP. - (25EA/CA)

## (undated) DEVICE — TOWELS CLOTH SURGICAL - (4/PK 20PK/CA)

## (undated) DEVICE — SUCTION INSTRUMENT YANKAUER BULBOUS TIP W/O VENT (50EA/CA)

## (undated) DEVICE — GLOVE BIOGEL PI INDICATOR SZ 7.5 SURGICAL PF LF -(50/BX 4BX/CA)